# Patient Record
Sex: MALE | Race: WHITE | Employment: OTHER | URBAN - NONMETROPOLITAN AREA
[De-identification: names, ages, dates, MRNs, and addresses within clinical notes are randomized per-mention and may not be internally consistent; named-entity substitution may affect disease eponyms.]

---

## 2017-01-01 ENCOUNTER — APPOINTMENT (OUTPATIENT)
Dept: CT IMAGING | Age: 77
DRG: 871 | End: 2017-01-01
Payer: MEDICARE

## 2017-01-01 ENCOUNTER — APPOINTMENT (OUTPATIENT)
Dept: GENERAL RADIOLOGY | Age: 77
DRG: 871 | End: 2017-01-01
Payer: MEDICARE

## 2017-01-01 ENCOUNTER — TELEPHONE (OUTPATIENT)
Dept: INTERNAL MEDICINE | Age: 77
End: 2017-01-01

## 2017-01-01 ENCOUNTER — HOSPITAL ENCOUNTER (INPATIENT)
Age: 77
LOS: 1 days | Discharge: HOSPICE | DRG: 193 | End: 2017-11-30
Attending: EMERGENCY MEDICINE | Admitting: HOSPITALIST
Payer: MEDICARE

## 2017-01-01 ENCOUNTER — INITIAL CONSULT (OUTPATIENT)
Dept: INTERNAL MEDICINE | Age: 77
End: 2017-01-01
Payer: MEDICARE

## 2017-01-01 ENCOUNTER — OFFICE VISIT (OUTPATIENT)
Dept: INTERNAL MEDICINE | Age: 77
End: 2017-01-01
Payer: MEDICARE

## 2017-01-01 ENCOUNTER — HOSPITAL ENCOUNTER (INPATIENT)
Age: 77
LOS: 8 days | Discharge: NURSING, REHAB OR LONG-TERM CARE | DRG: 871 | End: 2017-08-23
Attending: EMERGENCY MEDICINE | Admitting: INTERNAL MEDICINE
Payer: MEDICARE

## 2017-01-01 ENCOUNTER — HOSPITAL ENCOUNTER (OUTPATIENT)
Age: 77
End: 2017-12-01
Attending: INTERNAL MEDICINE | Admitting: INTERNAL MEDICINE
Payer: COMMERCIAL

## 2017-01-01 ENCOUNTER — APPOINTMENT (OUTPATIENT)
Dept: GENERAL RADIOLOGY | Age: 77
DRG: 193 | End: 2017-01-01
Payer: MEDICARE

## 2017-01-01 VITALS
DIASTOLIC BLOOD PRESSURE: 61 MMHG | TEMPERATURE: 97.9 F | HEART RATE: 105 BPM | BODY MASS INDEX: 20.92 KG/M2 | OXYGEN SATURATION: 92 % | WEIGHT: 138 LBS | RESPIRATION RATE: 20 BRPM | SYSTOLIC BLOOD PRESSURE: 97 MMHG | HEIGHT: 68 IN

## 2017-01-01 VITALS
WEIGHT: 143 LBS | HEIGHT: 68 IN | SYSTOLIC BLOOD PRESSURE: 120 MMHG | DIASTOLIC BLOOD PRESSURE: 60 MMHG | OXYGEN SATURATION: 94 % | HEART RATE: 90 BPM | BODY MASS INDEX: 21.67 KG/M2

## 2017-01-01 VITALS
RESPIRATION RATE: 32 BRPM | DIASTOLIC BLOOD PRESSURE: 80 MMHG | HEART RATE: 108 BPM | SYSTOLIC BLOOD PRESSURE: 132 MMHG | TEMPERATURE: 98 F

## 2017-01-01 VITALS — OXYGEN SATURATION: 90 % | HEART RATE: 106 BPM | DIASTOLIC BLOOD PRESSURE: 70 MMHG | SYSTOLIC BLOOD PRESSURE: 132 MMHG

## 2017-01-01 VITALS
OXYGEN SATURATION: 89 % | WEIGHT: 152 LBS | HEART RATE: 66 BPM | HEIGHT: 68 IN | RESPIRATION RATE: 22 BRPM | SYSTOLIC BLOOD PRESSURE: 144 MMHG | BODY MASS INDEX: 23.04 KG/M2 | DIASTOLIC BLOOD PRESSURE: 88 MMHG | TEMPERATURE: 97.8 F

## 2017-01-01 DIAGNOSIS — Z23 FLU VACCINE NEED: Primary | ICD-10-CM

## 2017-01-01 DIAGNOSIS — J18.9 PNEUMONIA DUE TO ORGANISM: ICD-10-CM

## 2017-01-01 DIAGNOSIS — C34.90 MALIGNANT NEOPLASM OF LUNG, UNSPECIFIED LATERALITY, UNSPECIFIED PART OF LUNG (HCC): ICD-10-CM

## 2017-01-01 DIAGNOSIS — C34.32: ICD-10-CM

## 2017-01-01 DIAGNOSIS — C34.90 MALIGNANT NEOPLASM OF LUNG, UNSPECIFIED LATERALITY, UNSPECIFIED PART OF LUNG (HCC): Primary | ICD-10-CM

## 2017-01-01 DIAGNOSIS — R53.1 WEAKNESS: Primary | ICD-10-CM

## 2017-01-01 DIAGNOSIS — J70.0 RADIATION PNEUMONITIS (HCC): ICD-10-CM

## 2017-01-01 DIAGNOSIS — I10 ESSENTIAL HYPERTENSION: ICD-10-CM

## 2017-01-01 DIAGNOSIS — E78.01 FAMILIAL HYPERCHOLESTEROLEMIA: ICD-10-CM

## 2017-01-01 DIAGNOSIS — J96.21 ACUTE ON CHRONIC RESPIRATORY FAILURE WITH HYPOXIA (HCC): ICD-10-CM

## 2017-01-01 DIAGNOSIS — C34.32 PRIMARY MALIGNANT NEOPLASM OF BRONCHUS OF LEFT LOWER LOBE (HCC): ICD-10-CM

## 2017-01-01 DIAGNOSIS — R06.89 DYSPNEA AND RESPIRATORY ABNORMALITIES: ICD-10-CM

## 2017-01-01 DIAGNOSIS — J18.9 ACUTE PNEUMONITIS: Primary | ICD-10-CM

## 2017-01-01 DIAGNOSIS — R06.00 DYSPNEA AND RESPIRATORY ABNORMALITIES: ICD-10-CM

## 2017-01-01 DIAGNOSIS — Z92.3 HISTORY OF THERAPEUTIC RADIATION: ICD-10-CM

## 2017-01-01 DIAGNOSIS — Z00.00 ROUTINE GENERAL MEDICAL EXAMINATION AT A HEALTH CARE FACILITY: Primary | ICD-10-CM

## 2017-01-01 LAB
ALBUMIN SERPL-MCNC: 3 G/DL (ref 3.5–5.2)
ALBUMIN SERPL-MCNC: 3.2 G/DL (ref 3.5–5.2)
ALBUMIN SERPL-MCNC: 3.2 G/DL (ref 3.5–5.2)
ALP BLD-CCNC: 50 U/L (ref 40–130)
ALP BLD-CCNC: 70 U/L (ref 40–130)
ALP BLD-CCNC: 86 U/L (ref 40–130)
ALT SERPL-CCNC: 26 U/L (ref 5–41)
ALT SERPL-CCNC: 32 U/L (ref 5–41)
ALT SERPL-CCNC: 90 U/L (ref 5–41)
ANION GAP SERPL CALCULATED.3IONS-SCNC: 13 MMOL/L (ref 7–19)
ANION GAP SERPL CALCULATED.3IONS-SCNC: 14 MMOL/L (ref 7–19)
ANION GAP SERPL CALCULATED.3IONS-SCNC: 14 MMOL/L (ref 7–19)
ANION GAP SERPL CALCULATED.3IONS-SCNC: 15 MMOL/L (ref 7–19)
ANION GAP SERPL CALCULATED.3IONS-SCNC: 15 MMOL/L (ref 7–19)
ANION GAP SERPL CALCULATED.3IONS-SCNC: 16 MMOL/L (ref 7–19)
ANION GAP SERPL CALCULATED.3IONS-SCNC: 18 MMOL/L (ref 7–19)
AST SERPL-CCNC: 22 U/L (ref 5–40)
AST SERPL-CCNC: 22 U/L (ref 5–40)
AST SERPL-CCNC: 39 U/L (ref 5–40)
BASE EXCESS ARTERIAL: 2.4 MMOL/L (ref -2–2)
BASE EXCESS ARTERIAL: 7.2 MMOL/L (ref -2–2)
BASOPHILS ABSOLUTE: 0 K/UL (ref 0–0.2)
BASOPHILS ABSOLUTE: 0.1 K/UL (ref 0–0.2)
BASOPHILS RELATIVE PERCENT: 0.1 % (ref 0–1)
BASOPHILS RELATIVE PERCENT: 0.2 % (ref 0–1)
BASOPHILS RELATIVE PERCENT: 0.7 % (ref 0–1)
BASOPHILS RELATIVE PERCENT: 0.8 % (ref 0–1)
BASOPHILS RELATIVE PERCENT: 0.8 % (ref 0–1)
BASOPHILS RELATIVE PERCENT: 0.9 % (ref 0–1)
BILIRUB SERPL-MCNC: 0.3 MG/DL (ref 0.2–1.2)
BLOOD CULTURE, ROUTINE: NORMAL
BUN BLDV-MCNC: 11 MG/DL (ref 8–23)
BUN BLDV-MCNC: 11 MG/DL (ref 8–23)
BUN BLDV-MCNC: 13 MG/DL (ref 8–23)
BUN BLDV-MCNC: 16 MG/DL (ref 8–23)
BUN BLDV-MCNC: 18 MG/DL (ref 8–23)
BUN BLDV-MCNC: 24 MG/DL (ref 8–23)
BUN BLDV-MCNC: 29 MG/DL (ref 8–23)
BUN BLDV-MCNC: 39 MG/DL (ref 8–23)
BUN BLDV-MCNC: 53 MG/DL (ref 8–23)
CALCIUM SERPL-MCNC: 8.4 MG/DL (ref 8.8–10.2)
CALCIUM SERPL-MCNC: 8.6 MG/DL (ref 8.8–10.2)
CALCIUM SERPL-MCNC: 8.7 MG/DL (ref 8.8–10.2)
CALCIUM SERPL-MCNC: 8.7 MG/DL (ref 8.8–10.2)
CALCIUM SERPL-MCNC: 9 MG/DL (ref 8.8–10.2)
CALCIUM SERPL-MCNC: 9.1 MG/DL (ref 8.8–10.2)
CALCIUM SERPL-MCNC: 9.2 MG/DL (ref 8.8–10.2)
CALCIUM SERPL-MCNC: 9.2 MG/DL (ref 8.8–10.2)
CALCIUM SERPL-MCNC: 9.4 MG/DL (ref 8.8–10.2)
CARBOXYHEMOGLOBIN ARTERIAL: 1.9 % (ref 0–5)
CARBOXYHEMOGLOBIN ARTERIAL: 2.6 % (ref 0–5)
CHLORIDE BLD-SCNC: 101 MMOL/L (ref 98–111)
CHLORIDE BLD-SCNC: 101 MMOL/L (ref 98–111)
CHLORIDE BLD-SCNC: 102 MMOL/L (ref 98–111)
CHLORIDE BLD-SCNC: 103 MMOL/L (ref 98–111)
CHLORIDE BLD-SCNC: 89 MMOL/L (ref 98–111)
CHLORIDE BLD-SCNC: 91 MMOL/L (ref 98–111)
CHLORIDE BLD-SCNC: 96 MMOL/L (ref 98–111)
CHOLESTEROL, TOTAL: 120 MG/DL (ref 160–199)
CO2: 18 MMOL/L (ref 22–29)
CO2: 20 MMOL/L (ref 22–29)
CO2: 21 MMOL/L (ref 22–29)
CO2: 24 MMOL/L (ref 22–29)
CO2: 27 MMOL/L (ref 22–29)
CO2: 27 MMOL/L (ref 22–29)
CO2: 28 MMOL/L (ref 22–29)
CO2: 30 MMOL/L (ref 22–29)
CO2: 31 MMOL/L (ref 22–29)
CREAT SERPL-MCNC: 0.5 MG/DL (ref 0.5–1.2)
CREAT SERPL-MCNC: 0.5 MG/DL (ref 0.5–1.2)
CREAT SERPL-MCNC: 0.6 MG/DL (ref 0.5–1.2)
CREAT SERPL-MCNC: 0.7 MG/DL (ref 0.5–1.2)
CREAT SERPL-MCNC: 0.7 MG/DL (ref 0.5–1.2)
CREAT SERPL-MCNC: 0.8 MG/DL (ref 0.5–1.2)
CREAT SERPL-MCNC: 0.8 MG/DL (ref 0.5–1.2)
CULTURE, BLOOD 2: NORMAL
CULTURE, RESPIRATORY: NORMAL
EKG P AXIS: 14 DEGREES
EKG P AXIS: 23 DEGREES
EKG P AXIS: 38 DEGREES
EKG P-R INTERVAL: 139 MS
EKG P-R INTERVAL: 144 MS
EKG P-R INTERVAL: 144 MS
EKG Q-T INTERVAL: 310 MS
EKG Q-T INTERVAL: 316 MS
EKG Q-T INTERVAL: 336 MS
EKG QRS DURATION: 70 MS
EKG QRS DURATION: 78 MS
EKG QRS DURATION: 80 MS
EKG QTC CALCULATION (BAZETT): 420 MS
EKG QTC CALCULATION (BAZETT): 433 MS
EKG QTC CALCULATION (BAZETT): 454 MS
EKG T AXIS: 80 DEGREES
EKG T AXIS: 90 DEGREES
EKG T AXIS: 93 DEGREES
EOSINOPHILS ABSOLUTE: 0 K/UL (ref 0–0.6)
EOSINOPHILS ABSOLUTE: 0 K/UL (ref 0–0.6)
EOSINOPHILS ABSOLUTE: 0.1 K/UL (ref 0–0.6)
EOSINOPHILS ABSOLUTE: 0.4 K/UL (ref 0–0.6)
EOSINOPHILS ABSOLUTE: 0.4 K/UL (ref 0–0.6)
EOSINOPHILS ABSOLUTE: 0.5 K/UL (ref 0–0.6)
EOSINOPHILS RELATIVE PERCENT: 0 % (ref 0–5)
EOSINOPHILS RELATIVE PERCENT: 0 % (ref 0–5)
EOSINOPHILS RELATIVE PERCENT: 1.3 % (ref 0–5)
EOSINOPHILS RELATIVE PERCENT: 2.8 % (ref 0–5)
EOSINOPHILS RELATIVE PERCENT: 3.9 % (ref 0–5)
EOSINOPHILS RELATIVE PERCENT: 4 % (ref 0–5)
GFR NON-AFRICAN AMERICAN: >60
GLUCOSE BLD-MCNC: 101 MG/DL (ref 74–109)
GLUCOSE BLD-MCNC: 116 MG/DL (ref 74–109)
GLUCOSE BLD-MCNC: 123 MG/DL (ref 70–99)
GLUCOSE BLD-MCNC: 138 MG/DL (ref 74–109)
GLUCOSE BLD-MCNC: 189 MG/DL (ref 70–99)
GLUCOSE BLD-MCNC: 189 MG/DL (ref 70–99)
GLUCOSE BLD-MCNC: 196 MG/DL (ref 74–109)
GLUCOSE BLD-MCNC: 247 MG/DL (ref 74–109)
GLUCOSE BLD-MCNC: 255 MG/DL (ref 70–99)
GLUCOSE BLD-MCNC: 271 MG/DL (ref 74–109)
GLUCOSE BLD-MCNC: 295 MG/DL (ref 70–99)
GLUCOSE BLD-MCNC: 295 MG/DL (ref 70–99)
GLUCOSE BLD-MCNC: 303 MG/DL (ref 70–99)
GLUCOSE BLD-MCNC: 304 MG/DL (ref 70–99)
GLUCOSE BLD-MCNC: 307 MG/DL (ref 74–109)
GLUCOSE BLD-MCNC: 324 MG/DL (ref 70–99)
GLUCOSE BLD-MCNC: 350 MG/DL (ref 70–99)
GLUCOSE BLD-MCNC: 356 MG/DL (ref 70–99)
GLUCOSE BLD-MCNC: 367 MG/DL (ref 70–99)
GLUCOSE BLD-MCNC: 424 MG/DL (ref 70–99)
GLUCOSE BLD-MCNC: 529 MG/DL (ref 70–99)
GLUCOSE BLD-MCNC: 560 MG/DL (ref 74–109)
GLUCOSE BLD-MCNC: 94 MG/DL (ref 74–109)
GRAM STAIN RESULT: NORMAL
HCO3 ARTERIAL: 24.5 MMOL/L (ref 22–26)
HCO3 ARTERIAL: 31.2 MMOL/L (ref 22–26)
HCT VFR BLD CALC: 35.8 % (ref 42–52)
HCT VFR BLD CALC: 36 % (ref 42–52)
HCT VFR BLD CALC: 36.3 % (ref 42–52)
HCT VFR BLD CALC: 37.4 % (ref 42–52)
HCT VFR BLD CALC: 37.7 % (ref 42–52)
HCT VFR BLD CALC: 38.9 % (ref 42–52)
HCT VFR BLD CALC: 40.9 % (ref 42–52)
HCT VFR BLD CALC: 41.1 % (ref 42–52)
HCT VFR BLD CALC: 41.2 % (ref 42–52)
HDLC SERPL-MCNC: 43 MG/DL (ref 55–121)
HEMOGLOBIN, ART, EXTENDED: 12.8 G/DL (ref 14–18)
HEMOGLOBIN, ART, EXTENDED: 13.8 G/DL (ref 14–18)
HEMOGLOBIN: 11.4 G/DL (ref 14–18)
HEMOGLOBIN: 11.6 G/DL (ref 14–18)
HEMOGLOBIN: 11.8 G/DL (ref 14–18)
HEMOGLOBIN: 11.8 G/DL (ref 14–18)
HEMOGLOBIN: 11.9 G/DL (ref 14–18)
HEMOGLOBIN: 12.2 G/DL (ref 14–18)
HEMOGLOBIN: 12.6 G/DL (ref 14–18)
HEMOGLOBIN: 12.9 G/DL (ref 14–18)
HEMOGLOBIN: 13.5 G/DL (ref 14–18)
INR BLD: 1.14 (ref 0.88–1.18)
LACTIC ACID: 1.9 MG/DL (ref 0.5–1.9)
LACTIC ACID: 2 MG/DL (ref 0.5–1.9)
LACTIC ACID: 2.1 MG/DL (ref 0.5–1.9)
LACTIC ACID: 2.4 MG/DL (ref 0.5–1.9)
LDL CHOLESTEROL CALCULATED: 58 MG/DL
LV EF: 58 %
LVEF MODALITY: NORMAL
LYMPHOCYTES ABSOLUTE: 0.4 K/UL (ref 1.1–4.5)
LYMPHOCYTES ABSOLUTE: 0.5 K/UL (ref 1.1–4.5)
LYMPHOCYTES ABSOLUTE: 0.7 K/UL (ref 1.1–4.5)
LYMPHOCYTES ABSOLUTE: 0.7 K/UL (ref 1.1–4.5)
LYMPHOCYTES ABSOLUTE: 0.8 K/UL (ref 1.1–4.5)
LYMPHOCYTES ABSOLUTE: 1.3 K/UL (ref 1.1–4.5)
LYMPHOCYTES RELATIVE PERCENT: 12.4 % (ref 20–40)
LYMPHOCYTES RELATIVE PERCENT: 2.2 % (ref 20–40)
LYMPHOCYTES RELATIVE PERCENT: 3.2 % (ref 20–40)
LYMPHOCYTES RELATIVE PERCENT: 3.2 % (ref 20–40)
LYMPHOCYTES RELATIVE PERCENT: 5.7 % (ref 20–40)
LYMPHOCYTES RELATIVE PERCENT: 7.6 % (ref 20–40)
MAGNESIUM: 2.2 MG/DL (ref 1.6–2.4)
MCH RBC QN AUTO: 27.8 PG (ref 27–31)
MCH RBC QN AUTO: 27.9 PG (ref 27–31)
MCH RBC QN AUTO: 28.5 PG (ref 27–31)
MCH RBC QN AUTO: 29.1 PG (ref 27–31)
MCH RBC QN AUTO: 29.3 PG (ref 27–31)
MCH RBC QN AUTO: 29.7 PG (ref 27–31)
MCH RBC QN AUTO: 29.9 PG (ref 27–31)
MCH RBC QN AUTO: 30 PG (ref 27–31)
MCH RBC QN AUTO: 30.1 PG (ref 27–31)
MCHC RBC AUTO-ENTMCNC: 30.3 G/DL (ref 33–37)
MCHC RBC AUTO-ENTMCNC: 30.7 G/DL (ref 33–37)
MCHC RBC AUTO-ENTMCNC: 31.3 G/DL (ref 33–37)
MCHC RBC AUTO-ENTMCNC: 31.5 G/DL (ref 33–37)
MCHC RBC AUTO-ENTMCNC: 31.8 G/DL (ref 33–37)
MCHC RBC AUTO-ENTMCNC: 32 G/DL (ref 33–37)
MCHC RBC AUTO-ENTMCNC: 32.6 G/DL (ref 33–37)
MCHC RBC AUTO-ENTMCNC: 32.8 G/DL (ref 33–37)
MCHC RBC AUTO-ENTMCNC: 33.1 G/DL (ref 33–37)
MCV RBC AUTO: 90.3 FL (ref 80–94)
MCV RBC AUTO: 90.5 FL (ref 80–94)
MCV RBC AUTO: 91.1 FL (ref 80–94)
MCV RBC AUTO: 91.3 FL (ref 80–94)
MCV RBC AUTO: 91.6 FL (ref 80–94)
MCV RBC AUTO: 91.7 FL (ref 80–94)
MCV RBC AUTO: 92.3 FL (ref 80–94)
MCV RBC AUTO: 92.8 FL (ref 80–94)
MCV RBC AUTO: 93.5 FL (ref 80–94)
METHEMOGLOBIN ARTERIAL: 1.1 %
METHEMOGLOBIN ARTERIAL: 1.1 %
MONOCYTES ABSOLUTE: 0.9 K/UL (ref 0–0.9)
MONOCYTES ABSOLUTE: 1.2 K/UL (ref 0–0.9)
MONOCYTES ABSOLUTE: 1.3 K/UL (ref 0–0.9)
MONOCYTES ABSOLUTE: 1.3 K/UL (ref 0–0.9)
MONOCYTES ABSOLUTE: 1.4 K/UL (ref 0–0.9)
MONOCYTES ABSOLUTE: 1.4 K/UL (ref 0–0.9)
MONOCYTES RELATIVE PERCENT: 10.3 % (ref 0–10)
MONOCYTES RELATIVE PERCENT: 11.5 % (ref 0–10)
MONOCYTES RELATIVE PERCENT: 14 % (ref 0–10)
MONOCYTES RELATIVE PERCENT: 6 % (ref 0–10)
MONOCYTES RELATIVE PERCENT: 6.6 % (ref 0–10)
MONOCYTES RELATIVE PERCENT: 6.7 % (ref 0–10)
NEUTROPHILS ABSOLUTE: 11.1 K/UL (ref 1.5–7.5)
NEUTROPHILS ABSOLUTE: 17.8 K/UL (ref 1.5–7.5)
NEUTROPHILS ABSOLUTE: 19.9 K/UL (ref 1.5–7.5)
NEUTROPHILS ABSOLUTE: 6.8 K/UL (ref 1.5–7.5)
NEUTROPHILS ABSOLUTE: 7.8 K/UL (ref 1.5–7.5)
NEUTROPHILS ABSOLUTE: 9.8 K/UL (ref 1.5–7.5)
NEUTROPHILS RELATIVE PERCENT: 67.3 % (ref 50–65)
NEUTROPHILS RELATIVE PERCENT: 75.1 % (ref 50–65)
NEUTROPHILS RELATIVE PERCENT: 75.4 % (ref 50–65)
NEUTROPHILS RELATIVE PERCENT: 83.3 % (ref 50–65)
NEUTROPHILS RELATIVE PERCENT: 84.2 % (ref 50–65)
NEUTROPHILS RELATIVE PERCENT: 89.5 % (ref 50–65)
O2 CONTENT ARTERIAL: 15.8 ML/DL
O2 CONTENT ARTERIAL: 17.6 ML/DL
O2 SAT, ARTERIAL: 88 %
O2 SAT, ARTERIAL: 91 %
O2 THERAPY: ABNORMAL
O2 THERAPY: ABNORMAL
PCO2 ARTERIAL: 30 MMHG (ref 35–45)
PCO2 ARTERIAL: 41 MMHG (ref 35–45)
PDW BLD-RTO: 15 % (ref 11.5–14.5)
PDW BLD-RTO: 15.2 % (ref 11.5–14.5)
PDW BLD-RTO: 15.3 % (ref 11.5–14.5)
PDW BLD-RTO: 15.3 % (ref 11.5–14.5)
PDW BLD-RTO: 15.4 % (ref 11.5–14.5)
PDW BLD-RTO: 15.9 % (ref 11.5–14.5)
PDW BLD-RTO: 15.9 % (ref 11.5–14.5)
PDW BLD-RTO: 16.1 % (ref 11.5–14.5)
PDW BLD-RTO: 16.4 % (ref 11.5–14.5)
PERFORMED ON: ABNORMAL
PH ARTERIAL: 7.49 (ref 7.35–7.45)
PH ARTERIAL: 7.52 (ref 7.35–7.45)
PLATELET # BLD: 277 K/UL (ref 130–400)
PLATELET # BLD: 283 K/UL (ref 130–400)
PLATELET # BLD: 335 K/UL (ref 130–400)
PLATELET # BLD: 343 K/UL (ref 130–400)
PLATELET # BLD: 349 K/UL (ref 130–400)
PLATELET # BLD: 382 K/UL (ref 130–400)
PLATELET # BLD: 384 K/UL (ref 130–400)
PLATELET # BLD: 417 K/UL (ref 130–400)
PLATELET # BLD: 421 K/UL (ref 130–400)
PMV BLD AUTO: 8.3 FL (ref 9.4–12.4)
PMV BLD AUTO: 8.7 FL (ref 9.4–12.4)
PMV BLD AUTO: 8.9 FL (ref 9.4–12.4)
PMV BLD AUTO: 8.9 FL (ref 9.4–12.4)
PMV BLD AUTO: 9 FL (ref 9.4–12.4)
PMV BLD AUTO: 9.1 FL (ref 9.4–12.4)
PMV BLD AUTO: 9.3 FL (ref 9.4–12.4)
PMV BLD AUTO: 9.5 FL (ref 9.4–12.4)
PMV BLD AUTO: 9.7 FL (ref 9.4–12.4)
PO2 ARTERIAL: 50 MMHG (ref 80–100)
PO2 ARTERIAL: 52 MMHG (ref 80–100)
POTASSIUM SERPL-SCNC: 3.4 MMOL/L (ref 3.5–5)
POTASSIUM SERPL-SCNC: 3.5 MMOL/L (ref 3.5–5)
POTASSIUM SERPL-SCNC: 3.6 MMOL/L (ref 3.5–5)
POTASSIUM SERPL-SCNC: 3.7 MMOL/L (ref 3.5–5)
POTASSIUM SERPL-SCNC: 4 MMOL/L (ref 3.5–5)
POTASSIUM SERPL-SCNC: 4.1 MMOL/L (ref 3.5–5)
POTASSIUM SERPL-SCNC: 4.1 MMOL/L (ref 3.5–5)
POTASSIUM SERPL-SCNC: 4.5 MMOL/L (ref 3.5–5)
POTASSIUM SERPL-SCNC: 4.7 MMOL/L (ref 3.5–5)
POTASSIUM, WHOLE BLOOD: 3.9
POTASSIUM, WHOLE BLOOD: 3.9
PRO-BNP: 1023 PG/ML (ref 0–1800)
PRO-BNP: 1438 PG/ML (ref 0–1800)
PRO-BNP: 3365 PG/ML (ref 0–1800)
PRO-BNP: 553 PG/ML (ref 0–1800)
PROSTATE SPECIFIC ANTIGEN: 0.02 NG/ML (ref 0–4)
PROTHROMBIN TIME: 14.5 SEC (ref 12–14.6)
RAPID INFLUENZA  B AGN: NEGATIVE
RAPID INFLUENZA A AGN: NEGATIVE
RBC # BLD: 3.91 M/UL (ref 4.7–6.1)
RBC # BLD: 3.92 M/UL (ref 4.7–6.1)
RBC # BLD: 3.98 M/UL (ref 4.7–6.1)
RBC # BLD: 4.03 M/UL (ref 4.7–6.1)
RBC # BLD: 4.05 M/UL (ref 4.7–6.1)
RBC # BLD: 4.24 M/UL (ref 4.7–6.1)
RBC # BLD: 4.5 M/UL (ref 4.7–6.1)
RBC # BLD: 4.51 M/UL (ref 4.7–6.1)
RBC # BLD: 4.53 M/UL (ref 4.7–6.1)
SODIUM BLD-SCNC: 131 MMOL/L (ref 136–145)
SODIUM BLD-SCNC: 133 MMOL/L (ref 136–145)
SODIUM BLD-SCNC: 136 MMOL/L (ref 136–145)
SODIUM BLD-SCNC: 137 MMOL/L (ref 136–145)
SODIUM BLD-SCNC: 139 MMOL/L (ref 136–145)
SODIUM BLD-SCNC: 140 MMOL/L (ref 136–145)
SODIUM BLD-SCNC: 142 MMOL/L (ref 136–145)
TOTAL PROTEIN: 6.9 G/DL (ref 6.6–8.7)
TOTAL PROTEIN: 7.1 G/DL (ref 6.6–8.7)
TOTAL PROTEIN: 7.4 G/DL (ref 6.6–8.7)
TRIGL SERPL-MCNC: 96 MG/DL (ref 150–199)
TSH SERPL DL<=0.05 MIU/L-ACNC: 0.44 UIU/ML (ref 0.27–4.2)
WBC # BLD: 10.2 K/UL (ref 4.8–10.8)
WBC # BLD: 10.4 K/UL (ref 4.8–10.8)
WBC # BLD: 10.7 K/UL (ref 4.8–10.8)
WBC # BLD: 13 K/UL (ref 4.8–10.8)
WBC # BLD: 13.3 K/UL (ref 4.8–10.8)
WBC # BLD: 21.2 K/UL (ref 4.8–10.8)
WBC # BLD: 21.2 K/UL (ref 4.8–10.8)
WBC # BLD: 22.2 K/UL (ref 4.8–10.8)
WBC # BLD: 24.1 K/UL (ref 4.8–10.8)

## 2017-01-01 PROCEDURE — 2700000000 HC OXYGEN THERAPY PER DAY

## 2017-01-01 PROCEDURE — 6370000000 HC RX 637 (ALT 250 FOR IP): Performed by: INTERNAL MEDICINE

## 2017-01-01 PROCEDURE — 36415 COLL VENOUS BLD VENIPUNCTURE: CPT

## 2017-01-01 PROCEDURE — 6370000000 HC RX 637 (ALT 250 FOR IP): Performed by: PHYSICIAN ASSISTANT

## 2017-01-01 PROCEDURE — 94640 AIRWAY INHALATION TREATMENT: CPT

## 2017-01-01 PROCEDURE — 2580000003 HC RX 258: Performed by: PHYSICIAN ASSISTANT

## 2017-01-01 PROCEDURE — 85027 COMPLETE CBC AUTOMATED: CPT

## 2017-01-01 PROCEDURE — 71010 XR CHEST PORTABLE: CPT

## 2017-01-01 PROCEDURE — 87040 BLOOD CULTURE FOR BACTERIA: CPT

## 2017-01-01 PROCEDURE — 1036F TOBACCO NON-USER: CPT | Performed by: INTERNAL MEDICINE

## 2017-01-01 PROCEDURE — 99284 EMERGENCY DEPT VISIT MOD MDM: CPT | Performed by: EMERGENCY MEDICINE

## 2017-01-01 PROCEDURE — 6360000002 HC RX W HCPCS: Performed by: PHYSICIAN ASSISTANT

## 2017-01-01 PROCEDURE — 82948 REAGENT STRIP/BLOOD GLUCOSE: CPT

## 2017-01-01 PROCEDURE — 84443 ASSAY THYROID STIM HORMONE: CPT

## 2017-01-01 PROCEDURE — 83605 ASSAY OF LACTIC ACID: CPT

## 2017-01-01 PROCEDURE — G8979 MOBILITY GOAL STATUS: HCPCS

## 2017-01-01 PROCEDURE — 2580000003 HC RX 258: Performed by: EMERGENCY MEDICINE

## 2017-01-01 PROCEDURE — 80048 BASIC METABOLIC PNL TOTAL CA: CPT

## 2017-01-01 PROCEDURE — 99232 SBSQ HOSP IP/OBS MODERATE 35: CPT | Performed by: INTERNAL MEDICINE

## 2017-01-01 PROCEDURE — 99285 EMERGENCY DEPT VISIT HI MDM: CPT

## 2017-01-01 PROCEDURE — 80053 COMPREHEN METABOLIC PANEL: CPT

## 2017-01-01 PROCEDURE — 36600 WITHDRAWAL OF ARTERIAL BLOOD: CPT

## 2017-01-01 PROCEDURE — 6370000000 HC RX 637 (ALT 250 FOR IP): Performed by: NURSE PRACTITIONER

## 2017-01-01 PROCEDURE — 6360000002 HC RX W HCPCS: Performed by: EMERGENCY MEDICINE

## 2017-01-01 PROCEDURE — G8978 MOBILITY CURRENT STATUS: HCPCS

## 2017-01-01 PROCEDURE — 6360000002 HC RX W HCPCS: Performed by: INTERNAL MEDICINE

## 2017-01-01 PROCEDURE — 83735 ASSAY OF MAGNESIUM: CPT

## 2017-01-01 PROCEDURE — 99222 1ST HOSP IP/OBS MODERATE 55: CPT | Performed by: INTERNAL MEDICINE

## 2017-01-01 PROCEDURE — 99233 SBSQ HOSP IP/OBS HIGH 50: CPT | Performed by: INTERNAL MEDICINE

## 2017-01-01 PROCEDURE — 1210000000 HC MED SURG R&B

## 2017-01-01 PROCEDURE — 71020 XR CHEST STANDARD TWO VW: CPT

## 2017-01-01 PROCEDURE — 6370000000 HC RX 637 (ALT 250 FOR IP): Performed by: HOSPITALIST

## 2017-01-01 PROCEDURE — 1123F ACP DISCUSS/DSCN MKR DOCD: CPT | Performed by: INTERNAL MEDICINE

## 2017-01-01 PROCEDURE — 99239 HOSP IP/OBS DSCHRG MGMT >30: CPT | Performed by: INTERNAL MEDICINE

## 2017-01-01 PROCEDURE — 85025 COMPLETE CBC W/AUTO DIFF WBC: CPT

## 2017-01-01 PROCEDURE — 83880 ASSAY OF NATRIURETIC PEPTIDE: CPT

## 2017-01-01 PROCEDURE — 93971 EXTREMITY STUDY: CPT

## 2017-01-01 PROCEDURE — 93005 ELECTROCARDIOGRAM TRACING: CPT

## 2017-01-01 PROCEDURE — 99222 1ST HOSP IP/OBS MODERATE 55: CPT | Performed by: PHYSICIAN ASSISTANT

## 2017-01-01 PROCEDURE — 99223 1ST HOSP IP/OBS HIGH 75: CPT | Performed by: INTERNAL MEDICINE

## 2017-01-01 PROCEDURE — 6360000002 HC RX W HCPCS

## 2017-01-01 PROCEDURE — G8420 CALC BMI NORM PARAMETERS: HCPCS | Performed by: INTERNAL MEDICINE

## 2017-01-01 PROCEDURE — 84132 ASSAY OF SERUM POTASSIUM: CPT

## 2017-01-01 PROCEDURE — 94664 DEMO&/EVAL PT USE INHALER: CPT

## 2017-01-01 PROCEDURE — 6370000000 HC RX 637 (ALT 250 FOR IP): Performed by: EMERGENCY MEDICINE

## 2017-01-01 PROCEDURE — 6360000002 HC RX W HCPCS: Performed by: NURSE PRACTITIONER

## 2017-01-01 PROCEDURE — G8427 DOCREV CUR MEDS BY ELIG CLIN: HCPCS | Performed by: INTERNAL MEDICINE

## 2017-01-01 PROCEDURE — 2580000003 HC RX 258: Performed by: INTERNAL MEDICINE

## 2017-01-01 PROCEDURE — 87804 INFLUENZA ASSAY W/OPTIC: CPT

## 2017-01-01 PROCEDURE — G8484 FLU IMMUNIZE NO ADMIN: HCPCS | Performed by: INTERNAL MEDICINE

## 2017-01-01 PROCEDURE — 6360000002 HC RX W HCPCS: Performed by: HOSPITALIST

## 2017-01-01 PROCEDURE — 82803 BLOOD GASES ANY COMBINATION: CPT

## 2017-01-01 PROCEDURE — 1250000000 HC SEMI PRIVATE HOSPICE R&B

## 2017-01-01 PROCEDURE — 87070 CULTURE OTHR SPECIMN AEROBIC: CPT

## 2017-01-01 PROCEDURE — G0008 ADMIN INFLUENZA VIRUS VAC: HCPCS | Performed by: INTERNAL MEDICINE

## 2017-01-01 PROCEDURE — 93306 TTE W/DOPPLER COMPLETE: CPT

## 2017-01-01 PROCEDURE — 99232 SBSQ HOSP IP/OBS MODERATE 35: CPT | Performed by: HOSPITALIST

## 2017-01-01 PROCEDURE — G0438 PPPS, INITIAL VISIT: HCPCS | Performed by: INTERNAL MEDICINE

## 2017-01-01 PROCEDURE — 90662 IIV NO PRSV INCREASED AG IM: CPT | Performed by: INTERNAL MEDICINE

## 2017-01-01 PROCEDURE — 99214 OFFICE O/P EST MOD 30 MIN: CPT | Performed by: INTERNAL MEDICINE

## 2017-01-01 PROCEDURE — 99232 SBSQ HOSP IP/OBS MODERATE 35: CPT | Performed by: PHYSICIAN ASSISTANT

## 2017-01-01 PROCEDURE — 94762 N-INVAS EAR/PLS OXIMTRY CONT: CPT

## 2017-01-01 PROCEDURE — 71260 CT THORAX DX C+: CPT

## 2017-01-01 PROCEDURE — 87205 SMEAR GRAM STAIN: CPT

## 2017-01-01 PROCEDURE — 99239 HOSP IP/OBS DSCHRG MGMT >30: CPT | Performed by: HOSPITALIST

## 2017-01-01 PROCEDURE — 97162 PT EVAL MOD COMPLEX 30 MIN: CPT

## 2017-01-01 PROCEDURE — 96365 THER/PROPH/DIAG IV INF INIT: CPT

## 2017-01-01 PROCEDURE — 85610 PROTHROMBIN TIME: CPT

## 2017-01-01 PROCEDURE — 4040F PNEUMOC VAC/ADMIN/RCVD: CPT | Performed by: INTERNAL MEDICINE

## 2017-01-01 PROCEDURE — 6360000004 HC RX CONTRAST MEDICATION: Performed by: INTERNAL MEDICINE

## 2017-01-01 RX ORDER — ALBUTEROL SULFATE 2.5 MG/3ML
2.5 SOLUTION RESPIRATORY (INHALATION) EVERY 4 HOURS PRN
Status: DISCONTINUED | OUTPATIENT
Start: 2017-01-01 | End: 2017-01-01

## 2017-01-01 RX ORDER — INSULIN GLARGINE 100 [IU]/ML
10 INJECTION, SOLUTION SUBCUTANEOUS 2 TIMES DAILY
Status: DISCONTINUED | OUTPATIENT
Start: 2017-01-01 | End: 2017-01-01

## 2017-01-01 RX ORDER — ALBUTEROL SULFATE 2.5 MG/3ML
2.5 SOLUTION RESPIRATORY (INHALATION) EVERY 4 HOURS PRN
Status: CANCELLED | OUTPATIENT
Start: 2017-01-01

## 2017-01-01 RX ORDER — GUAIFENESIN 600 MG/1
1200 TABLET, EXTENDED RELEASE ORAL 2 TIMES DAILY
Qty: 20 TABLET | Refills: 0 | Status: SHIPPED | OUTPATIENT
Start: 2017-01-01 | End: 2017-01-01

## 2017-01-01 RX ORDER — FOLIC ACID 1 MG/1
TABLET ORAL
COMMUNITY
Start: 2017-01-01

## 2017-01-01 RX ORDER — SIMVASTATIN 20 MG
40 TABLET ORAL NIGHTLY
Status: DISCONTINUED | OUTPATIENT
Start: 2017-01-01 | End: 2017-01-01 | Stop reason: HOSPADM

## 2017-01-01 RX ORDER — METHYLPREDNISOLONE SODIUM SUCCINATE 40 MG/ML
60 INJECTION, POWDER, LYOPHILIZED, FOR SOLUTION INTRAMUSCULAR; INTRAVENOUS ONCE
Status: COMPLETED | OUTPATIENT
Start: 2017-01-01 | End: 2017-01-01

## 2017-01-01 RX ORDER — FUROSEMIDE 40 MG/1
40 TABLET ORAL DAILY
Qty: 3 TABLET | Refills: 0
Start: 2017-01-01 | End: 2017-01-01 | Stop reason: ALTCHOICE

## 2017-01-01 RX ORDER — PREDNISONE 10 MG/1
10 TABLET ORAL DAILY
COMMUNITY
End: 2017-01-01 | Stop reason: ALTCHOICE

## 2017-01-01 RX ORDER — DEXTROSE MONOHYDRATE 50 MG/ML
100 INJECTION, SOLUTION INTRAVENOUS PRN
Status: DISCONTINUED | OUTPATIENT
Start: 2017-01-01 | End: 2017-01-01 | Stop reason: HOSPADM

## 2017-01-01 RX ORDER — INSULIN GLARGINE 100 [IU]/ML
15 INJECTION, SOLUTION SUBCUTANEOUS 2 TIMES DAILY
Qty: 1 VIAL | Refills: 3
Start: 2017-01-01 | End: 2017-01-01 | Stop reason: ALTCHOICE

## 2017-01-01 RX ORDER — LORAZEPAM 2 MG/ML
1 INJECTION INTRAMUSCULAR
Status: DISCONTINUED | OUTPATIENT
Start: 2017-01-01 | End: 2017-01-01 | Stop reason: HOSPADM

## 2017-01-01 RX ORDER — PREDNISONE 10 MG/1
10 TABLET ORAL
COMMUNITY
Start: 2017-01-01 | End: 2017-01-01 | Stop reason: SDUPTHER

## 2017-01-01 RX ORDER — SODIUM CHLORIDE 9 MG/ML
INJECTION, SOLUTION INTRAVENOUS CONTINUOUS
Status: DISCONTINUED | OUTPATIENT
Start: 2017-01-01 | End: 2017-01-01

## 2017-01-01 RX ORDER — METHYLPREDNISOLONE SODIUM SUCCINATE 40 MG/ML
40 INJECTION, POWDER, LYOPHILIZED, FOR SOLUTION INTRAMUSCULAR; INTRAVENOUS EVERY 6 HOURS
Status: DISCONTINUED | OUTPATIENT
Start: 2017-01-01 | End: 2017-01-01

## 2017-01-01 RX ORDER — LANOLIN ALCOHOL/MO/W.PET/CERES
6 CREAM (GRAM) TOPICAL NIGHTLY PRN
Status: DISCONTINUED | OUTPATIENT
Start: 2017-01-01 | End: 2017-01-01

## 2017-01-01 RX ORDER — FOLIC ACID 1 MG/1
1 TABLET ORAL DAILY
Status: CANCELLED | OUTPATIENT
Start: 2017-01-01

## 2017-01-01 RX ORDER — METHYLPREDNISOLONE SODIUM SUCCINATE 40 MG/ML
40 INJECTION, POWDER, LYOPHILIZED, FOR SOLUTION INTRAMUSCULAR; INTRAVENOUS EVERY 8 HOURS
Qty: 360 MG | Refills: 0
Start: 2017-01-01 | End: 2017-01-01

## 2017-01-01 RX ORDER — DOCUSATE SODIUM 100 MG/1
100 CAPSULE, LIQUID FILLED ORAL DAILY
Status: DISCONTINUED | OUTPATIENT
Start: 2017-01-01 | End: 2017-01-01

## 2017-01-01 RX ORDER — ATROPINE SULFATE 10 MG/ML
2 SOLUTION/ DROPS OPHTHALMIC EVERY 4 HOURS PRN
Status: DISCONTINUED | OUTPATIENT
Start: 2017-01-01 | End: 2017-01-01 | Stop reason: HOSPADM

## 2017-01-01 RX ORDER — BISACODYL 10 MG
10 SUPPOSITORY, RECTAL RECTAL DAILY PRN
Status: DISCONTINUED | OUTPATIENT
Start: 2017-01-01 | End: 2017-01-01 | Stop reason: HOSPADM

## 2017-01-01 RX ORDER — LANOLIN ALCOHOL/MO/W.PET/CERES
6 CREAM (GRAM) TOPICAL NIGHTLY PRN
Status: CANCELLED | OUTPATIENT
Start: 2017-01-01

## 2017-01-01 RX ORDER — FOLIC ACID 1 MG/1
1 TABLET ORAL DAILY
Status: DISCONTINUED | OUTPATIENT
Start: 2017-01-01 | End: 2017-01-01

## 2017-01-01 RX ORDER — MORPHINE SULFATE 4 MG/ML
1 INJECTION, SOLUTION INTRAMUSCULAR; INTRAVENOUS
Status: DISCONTINUED | OUTPATIENT
Start: 2017-01-01 | End: 2017-01-01

## 2017-01-01 RX ORDER — INSULIN GLARGINE 100 [IU]/ML
20 INJECTION, SOLUTION SUBCUTANEOUS ONCE
Status: DISCONTINUED | OUTPATIENT
Start: 2017-01-01 | End: 2017-01-01

## 2017-01-01 RX ORDER — POTASSIUM CHLORIDE 20 MEQ/1
20 TABLET, EXTENDED RELEASE ORAL ONCE
Status: COMPLETED | OUTPATIENT
Start: 2017-01-01 | End: 2017-01-01

## 2017-01-01 RX ORDER — DOCUSATE SODIUM 100 MG/1
100 CAPSULE, LIQUID FILLED ORAL 2 TIMES DAILY
Status: DISCONTINUED | OUTPATIENT
Start: 2017-01-01 | End: 2017-01-01 | Stop reason: HOSPADM

## 2017-01-01 RX ORDER — ONDANSETRON 2 MG/ML
4 INJECTION INTRAMUSCULAR; INTRAVENOUS EVERY 6 HOURS PRN
Status: DISCONTINUED | OUTPATIENT
Start: 2017-01-01 | End: 2017-01-01

## 2017-01-01 RX ORDER — MORPHINE SULFATE 4 MG/ML
1 INJECTION, SOLUTION INTRAMUSCULAR; INTRAVENOUS
Status: CANCELLED | OUTPATIENT
Start: 2017-01-01

## 2017-01-01 RX ORDER — GUAIFENESIN 600 MG/1
600 TABLET, EXTENDED RELEASE ORAL 2 TIMES DAILY
Status: DISCONTINUED | OUTPATIENT
Start: 2017-01-01 | End: 2017-01-01

## 2017-01-01 RX ORDER — FOLIC ACID 1 MG/1
1 TABLET ORAL DAILY
Status: DISCONTINUED | OUTPATIENT
Start: 2017-01-01 | End: 2017-01-01 | Stop reason: HOSPADM

## 2017-01-01 RX ORDER — LEVOFLOXACIN 5 MG/ML
750 INJECTION, SOLUTION INTRAVENOUS ONCE
Status: COMPLETED | OUTPATIENT
Start: 2017-01-01 | End: 2017-01-01

## 2017-01-01 RX ORDER — METHYLPREDNISOLONE SODIUM SUCCINATE 125 MG/2ML
60 INJECTION, POWDER, LYOPHILIZED, FOR SOLUTION INTRAMUSCULAR; INTRAVENOUS EVERY 8 HOURS
Status: DISCONTINUED | OUTPATIENT
Start: 2017-01-01 | End: 2017-01-01

## 2017-01-01 RX ORDER — FLUTICASONE FUROATE AND VILANTEROL 100; 25 UG/1; UG/1
POWDER RESPIRATORY (INHALATION) DAILY
COMMUNITY
End: 2017-01-01 | Stop reason: ALTCHOICE

## 2017-01-01 RX ORDER — LEVALBUTEROL INHALATION SOLUTION 1.25 MG/3ML
1.25 SOLUTION RESPIRATORY (INHALATION)
Status: DISCONTINUED | OUTPATIENT
Start: 2017-01-01 | End: 2017-01-01 | Stop reason: HOSPADM

## 2017-01-01 RX ORDER — LEVALBUTEROL INHALATION SOLUTION 1.25 MG/3ML
1.25 SOLUTION RESPIRATORY (INHALATION) EVERY 4 HOURS
Status: DISCONTINUED | OUTPATIENT
Start: 2017-01-01 | End: 2017-01-01 | Stop reason: HOSPADM

## 2017-01-01 RX ORDER — LORAZEPAM 2 MG/ML
1 INJECTION INTRAMUSCULAR EVERY 4 HOURS PRN
Status: DISCONTINUED | OUTPATIENT
Start: 2017-01-01 | End: 2017-01-01

## 2017-01-01 RX ORDER — METHYLPREDNISOLONE SODIUM SUCCINATE 40 MG/ML
40 INJECTION, POWDER, LYOPHILIZED, FOR SOLUTION INTRAMUSCULAR; INTRAVENOUS EVERY 8 HOURS
Status: DISCONTINUED | OUTPATIENT
Start: 2017-01-01 | End: 2017-01-01

## 2017-01-01 RX ORDER — FUROSEMIDE 10 MG/ML
INJECTION INTRAMUSCULAR; INTRAVENOUS
Status: COMPLETED
Start: 2017-01-01 | End: 2017-01-01

## 2017-01-01 RX ORDER — IPRATROPIUM BROMIDE AND ALBUTEROL SULFATE 2.5; .5 MG/3ML; MG/3ML
1 SOLUTION RESPIRATORY (INHALATION) EVERY 4 HOURS PRN
Status: CANCELLED | OUTPATIENT
Start: 2017-01-01

## 2017-01-01 RX ORDER — SCOLOPAMINE TRANSDERMAL SYSTEM 1 MG/1
1 PATCH, EXTENDED RELEASE TRANSDERMAL
Status: DISCONTINUED | OUTPATIENT
Start: 2017-01-01 | End: 2017-01-01 | Stop reason: HOSPADM

## 2017-01-01 RX ORDER — METHYLPREDNISOLONE SODIUM SUCCINATE 40 MG/ML
40 INJECTION, POWDER, LYOPHILIZED, FOR SOLUTION INTRAMUSCULAR; INTRAVENOUS EVERY 8 HOURS
Status: DISCONTINUED | OUTPATIENT
Start: 2017-01-01 | End: 2017-01-01 | Stop reason: HOSPADM

## 2017-01-01 RX ORDER — VANCOMYCIN HYDROCHLORIDE 1 G/200ML
1000 INJECTION, SOLUTION INTRAVENOUS EVERY 12 HOURS
Status: DISCONTINUED | OUTPATIENT
Start: 2017-01-01 | End: 2017-01-01 | Stop reason: HOSPADM

## 2017-01-01 RX ORDER — ACETAMINOPHEN 500 MG
1000 TABLET ORAL EVERY 6 HOURS PRN
COMMUNITY

## 2017-01-01 RX ORDER — LANOLIN ALCOHOL/MO/W.PET/CERES
6 CREAM (GRAM) TOPICAL NIGHTLY PRN
Status: DISCONTINUED | OUTPATIENT
Start: 2017-01-01 | End: 2017-01-01 | Stop reason: HOSPADM

## 2017-01-01 RX ORDER — LEVALBUTEROL INHALATION SOLUTION 1.25 MG/3ML
1.25 SOLUTION RESPIRATORY (INHALATION) EVERY 4 HOURS
Qty: 540 ML | Refills: 0
Start: 2017-01-01 | End: 2017-01-01 | Stop reason: ALTCHOICE

## 2017-01-01 RX ORDER — HALOPERIDOL 5 MG/ML
2 INJECTION INTRAMUSCULAR EVERY 6 HOURS PRN
Status: DISCONTINUED | OUTPATIENT
Start: 2017-01-01 | End: 2017-01-01 | Stop reason: HOSPADM

## 2017-01-01 RX ORDER — FUROSEMIDE 10 MG/ML
40 INJECTION INTRAMUSCULAR; INTRAVENOUS ONCE
Status: COMPLETED | OUTPATIENT
Start: 2017-01-01 | End: 2017-01-01

## 2017-01-01 RX ORDER — INSULIN GLARGINE 100 [IU]/ML
15 INJECTION, SOLUTION SUBCUTANEOUS 2 TIMES DAILY
Status: DISCONTINUED | OUTPATIENT
Start: 2017-01-01 | End: 2017-01-01 | Stop reason: HOSPADM

## 2017-01-01 RX ORDER — GABAPENTIN 300 MG/1
300 CAPSULE ORAL NIGHTLY
COMMUNITY

## 2017-01-01 RX ORDER — GUAIFENESIN 600 MG/1
1200 TABLET, EXTENDED RELEASE ORAL 2 TIMES DAILY
Status: DISCONTINUED | OUTPATIENT
Start: 2017-01-01 | End: 2017-01-01 | Stop reason: HOSPADM

## 2017-01-01 RX ORDER — DEXTROSE MONOHYDRATE 25 G/50ML
12.5 INJECTION, SOLUTION INTRAVENOUS PRN
Status: DISCONTINUED | OUTPATIENT
Start: 2017-01-01 | End: 2017-01-01 | Stop reason: HOSPADM

## 2017-01-01 RX ORDER — MORPHINE SULFATE 4 MG/ML
1 INJECTION, SOLUTION INTRAMUSCULAR; INTRAVENOUS
Status: DISCONTINUED | OUTPATIENT
Start: 2017-01-01 | End: 2017-01-01 | Stop reason: HOSPADM

## 2017-01-01 RX ORDER — BENZONATATE 100 MG/1
200 CAPSULE ORAL 3 TIMES DAILY PRN
COMMUNITY

## 2017-01-01 RX ORDER — FUROSEMIDE 10 MG/ML
40 INJECTION INTRAMUSCULAR; INTRAVENOUS EVERY 12 HOURS
Status: DISCONTINUED | OUTPATIENT
Start: 2017-01-01 | End: 2017-01-01

## 2017-01-01 RX ORDER — INSULIN GLARGINE 100 [IU]/ML
20 INJECTION, SOLUTION SUBCUTANEOUS 2 TIMES DAILY
Status: DISCONTINUED | OUTPATIENT
Start: 2017-01-01 | End: 2017-01-01

## 2017-01-01 RX ORDER — SODIUM CHLORIDE 0.9 % (FLUSH) 0.9 %
10 SYRINGE (ML) INJECTION PRN
Status: DISCONTINUED | OUTPATIENT
Start: 2017-01-01 | End: 2017-01-01 | Stop reason: HOSPADM

## 2017-01-01 RX ORDER — LEVOFLOXACIN 500 MG/1
500 TABLET, FILM COATED ORAL EVERY 24 HOURS
Qty: 5 TABLET | Refills: 0 | Status: SHIPPED | OUTPATIENT
Start: 2017-01-01 | End: 2017-01-01

## 2017-01-01 RX ORDER — POLYETHYLENE GLYCOL 3350 17 G/17G
17 POWDER, FOR SOLUTION ORAL
COMMUNITY
Start: 2017-01-01 | End: 2017-01-01 | Stop reason: ALTCHOICE

## 2017-01-01 RX ORDER — LORAZEPAM 2 MG/ML
1 INJECTION INTRAMUSCULAR
Status: DISCONTINUED | OUTPATIENT
Start: 2017-01-01 | End: 2017-01-01

## 2017-01-01 RX ORDER — IPRATROPIUM BROMIDE AND ALBUTEROL SULFATE 2.5; .5 MG/3ML; MG/3ML
1 SOLUTION RESPIRATORY (INHALATION) ONCE
Status: COMPLETED | OUTPATIENT
Start: 2017-01-01 | End: 2017-01-01

## 2017-01-01 RX ORDER — MORPHINE SULFATE 4 MG/ML
2 INJECTION, SOLUTION INTRAMUSCULAR; INTRAVENOUS
Status: DISCONTINUED | OUTPATIENT
Start: 2017-01-01 | End: 2017-01-01 | Stop reason: HOSPADM

## 2017-01-01 RX ORDER — LISINOPRIL 20 MG/1
20 TABLET ORAL DAILY
Status: DISCONTINUED | OUTPATIENT
Start: 2017-01-01 | End: 2017-01-01

## 2017-01-01 RX ORDER — HYDROXYZINE HYDROCHLORIDE 25 MG/ML
25 INJECTION, SOLUTION INTRAMUSCULAR EVERY 6 HOURS PRN
Status: DISCONTINUED | OUTPATIENT
Start: 2017-01-01 | End: 2017-01-01

## 2017-01-01 RX ORDER — NICOTINE POLACRILEX 4 MG
15 LOZENGE BUCCAL PRN
Status: DISCONTINUED | OUTPATIENT
Start: 2017-01-01 | End: 2017-01-01 | Stop reason: HOSPADM

## 2017-01-01 RX ORDER — DOCUSATE SODIUM 100 MG/1
100 CAPSULE, LIQUID FILLED ORAL DAILY
Status: CANCELLED | OUTPATIENT
Start: 2017-01-01

## 2017-01-01 RX ORDER — LEVOFLOXACIN 5 MG/ML
750 INJECTION, SOLUTION INTRAVENOUS EVERY 24 HOURS
Status: DISCONTINUED | OUTPATIENT
Start: 2017-01-01 | End: 2017-01-01

## 2017-01-01 RX ORDER — ZOLPIDEM TARTRATE 5 MG/1
5 TABLET ORAL NIGHTLY PRN
Status: DISCONTINUED | OUTPATIENT
Start: 2017-01-01 | End: 2017-01-01 | Stop reason: HOSPADM

## 2017-01-01 RX ORDER — FUROSEMIDE 40 MG/1
40 TABLET ORAL DAILY
Status: DISCONTINUED | OUTPATIENT
Start: 2017-01-01 | End: 2017-01-01 | Stop reason: HOSPADM

## 2017-01-01 RX ORDER — IPRATROPIUM BROMIDE AND ALBUTEROL SULFATE 2.5; .5 MG/3ML; MG/3ML
1 SOLUTION RESPIRATORY (INHALATION) EVERY 4 HOURS PRN
Status: DISCONTINUED | OUTPATIENT
Start: 2017-01-01 | End: 2017-01-01 | Stop reason: HOSPADM

## 2017-01-01 RX ORDER — ACETAMINOPHEN 650 MG/1
650 SUPPOSITORY RECTAL EVERY 4 HOURS PRN
Status: DISCONTINUED | OUTPATIENT
Start: 2017-01-01 | End: 2017-01-01 | Stop reason: HOSPADM

## 2017-01-01 RX ORDER — IPRATROPIUM BROMIDE AND ALBUTEROL SULFATE 2.5; .5 MG/3ML; MG/3ML
1 SOLUTION RESPIRATORY (INHALATION) EVERY 4 HOURS PRN
Status: DISCONTINUED | OUTPATIENT
Start: 2017-01-01 | End: 2017-01-01

## 2017-01-01 RX ORDER — POTASSIUM CHLORIDE 20 MEQ/1
20 TABLET, EXTENDED RELEASE ORAL 2 TIMES DAILY
Status: DISCONTINUED | OUTPATIENT
Start: 2017-01-01 | End: 2017-01-01 | Stop reason: HOSPADM

## 2017-01-01 RX ORDER — SODIUM CHLORIDE 0.9 % (FLUSH) 0.9 %
10 SYRINGE (ML) INJECTION EVERY 12 HOURS SCHEDULED
Status: DISCONTINUED | OUTPATIENT
Start: 2017-01-01 | End: 2017-01-01 | Stop reason: HOSPADM

## 2017-01-01 RX ORDER — MEGESTROL ACETATE 125 MG/ML
40 SUSPENSION ORAL
Status: ON HOLD | COMMUNITY
End: 2017-01-01 | Stop reason: ALTCHOICE

## 2017-01-01 RX ORDER — PSEUDOEPHEDRINE HCL 30 MG
100 TABLET ORAL DAILY
COMMUNITY
Start: 2017-01-01

## 2017-01-01 RX ORDER — LEVALBUTEROL INHALATION SOLUTION 1.25 MG/3ML
2.5 SOLUTION RESPIRATORY (INHALATION) EVERY 6 HOURS
Status: DISCONTINUED | OUTPATIENT
Start: 2017-01-01 | End: 2017-01-01 | Stop reason: HOSPADM

## 2017-01-01 RX ORDER — LORAZEPAM 2 MG/ML
1 INJECTION INTRAMUSCULAR
Status: CANCELLED | OUTPATIENT
Start: 2017-01-01

## 2017-01-01 RX ORDER — HYDROXYZINE PAMOATE 25 MG/1
25 CAPSULE ORAL NIGHTLY PRN
Status: DISCONTINUED | OUTPATIENT
Start: 2017-01-01 | End: 2017-01-01

## 2017-01-01 RX ORDER — ZOLPIDEM TARTRATE 5 MG/1
5 TABLET ORAL NIGHTLY PRN
COMMUNITY
End: 2017-01-01 | Stop reason: ALTCHOICE

## 2017-01-01 RX ORDER — ACETAMINOPHEN 325 MG/1
650 TABLET ORAL EVERY 4 HOURS PRN
Status: DISCONTINUED | OUTPATIENT
Start: 2017-01-01 | End: 2017-01-01 | Stop reason: HOSPADM

## 2017-01-01 RX ORDER — AZITHROMYCIN 250 MG/1
250 TABLET, FILM COATED ORAL DAILY
Status: DISCONTINUED | OUTPATIENT
Start: 2017-01-01 | End: 2017-01-01

## 2017-01-01 RX ORDER — GLYCOPYRROLATE 0.2 MG/ML
0.2 INJECTION INTRAMUSCULAR; INTRAVENOUS EVERY 6 HOURS PRN
Status: DISCONTINUED | OUTPATIENT
Start: 2017-01-01 | End: 2017-01-01 | Stop reason: HOSPADM

## 2017-01-01 RX ORDER — IPRATROPIUM BROMIDE AND ALBUTEROL SULFATE 2.5; .5 MG/3ML; MG/3ML
1 SOLUTION RESPIRATORY (INHALATION)
Status: DISCONTINUED | OUTPATIENT
Start: 2017-01-01 | End: 2017-01-01

## 2017-01-01 RX ORDER — LEVOFLOXACIN 500 MG/1
500 TABLET, FILM COATED ORAL EVERY 24 HOURS
Status: DISCONTINUED | OUTPATIENT
Start: 2017-01-01 | End: 2017-01-01 | Stop reason: HOSPADM

## 2017-01-01 RX ORDER — BUDESONIDE AND FORMOTEROL FUMARATE DIHYDRATE 80; 4.5 UG/1; UG/1
2 AEROSOL RESPIRATORY (INHALATION)
COMMUNITY
Start: 2017-01-01

## 2017-01-01 RX ORDER — PANTOPRAZOLE SODIUM 40 MG/1
40 TABLET, DELAYED RELEASE ORAL
Status: DISCONTINUED | OUTPATIENT
Start: 2017-01-01 | End: 2017-01-01 | Stop reason: HOSPADM

## 2017-01-01 RX ORDER — METHYLPREDNISOLONE SODIUM SUCCINATE 125 MG/2ML
60 INJECTION, POWDER, LYOPHILIZED, FOR SOLUTION INTRAMUSCULAR; INTRAVENOUS EVERY 6 HOURS
Status: DISCONTINUED | OUTPATIENT
Start: 2017-01-01 | End: 2017-01-01

## 2017-01-01 RX ORDER — DOCUSATE SODIUM 100 MG/1
100 CAPSULE, LIQUID FILLED ORAL DAILY
Status: DISCONTINUED | OUTPATIENT
Start: 2017-01-01 | End: 2017-01-01 | Stop reason: HOSPADM

## 2017-01-01 RX ORDER — LEVALBUTEROL 1.25 MG/.5ML
2.5 SOLUTION, CONCENTRATE RESPIRATORY (INHALATION)
COMMUNITY
Start: 2017-01-01

## 2017-01-01 RX ADMIN — METHYLPREDNISOLONE SODIUM SUCCINATE 60 MG: 125 INJECTION, POWDER, FOR SOLUTION INTRAMUSCULAR; INTRAVENOUS at 18:14

## 2017-01-01 RX ADMIN — METHYLPREDNISOLONE SODIUM SUCCINATE 40 MG: 40 INJECTION, POWDER, FOR SOLUTION INTRAMUSCULAR; INTRAVENOUS at 17:55

## 2017-01-01 RX ADMIN — FUROSEMIDE 40 MG: 10 INJECTION, SOLUTION INTRAMUSCULAR; INTRAVENOUS at 20:24

## 2017-01-01 RX ADMIN — INSULIN LISPRO 15 UNITS: 100 INJECTION, SOLUTION INTRAVENOUS; SUBCUTANEOUS at 18:09

## 2017-01-01 RX ADMIN — LORAZEPAM 1 MG: 2 INJECTION INTRAMUSCULAR; INTRAVENOUS at 00:32

## 2017-01-01 RX ADMIN — LEVALBUTEROL 1.25 MG: 1.25 SOLUTION RESPIRATORY (INHALATION) at 20:25

## 2017-01-01 RX ADMIN — IPRATROPIUM BROMIDE 0.5 MG: 0.5 SOLUTION RESPIRATORY (INHALATION) at 22:47

## 2017-01-01 RX ADMIN — GUAIFENESIN 1200 MG: 600 TABLET, EXTENDED RELEASE ORAL at 08:39

## 2017-01-01 RX ADMIN — LEVALBUTEROL HYDROCHLORIDE 1.25 MG: 1.25 SOLUTION RESPIRATORY (INHALATION) at 07:29

## 2017-01-01 RX ADMIN — IPRATROPIUM BROMIDE 0.5 MG: 0.5 SOLUTION RESPIRATORY (INHALATION) at 07:29

## 2017-01-01 RX ADMIN — POTASSIUM CHLORIDE 20 MEQ: 20 TABLET, EXTENDED RELEASE ORAL at 09:28

## 2017-01-01 RX ADMIN — Medication 10 ML: at 21:37

## 2017-01-01 RX ADMIN — METHYLPREDNISOLONE SODIUM SUCCINATE 40 MG: 40 INJECTION, POWDER, FOR SOLUTION INTRAMUSCULAR; INTRAVENOUS at 16:18

## 2017-01-01 RX ADMIN — INSULIN LISPRO 6 UNITS: 100 INJECTION, SOLUTION INTRAVENOUS; SUBCUTANEOUS at 20:39

## 2017-01-01 RX ADMIN — METHYLPREDNISOLONE SODIUM SUCCINATE 40 MG: 40 INJECTION, POWDER, FOR SOLUTION INTRAMUSCULAR; INTRAVENOUS at 01:33

## 2017-01-01 RX ADMIN — ZOLPIDEM TARTRATE 5 MG: 5 TABLET ORAL at 20:37

## 2017-01-01 RX ADMIN — SIMVASTATIN 40 MG: 20 TABLET, FILM COATED ORAL at 20:20

## 2017-01-01 RX ADMIN — METHYLPREDNISOLONE SODIUM SUCCINATE 40 MG: 40 INJECTION, POWDER, FOR SOLUTION INTRAMUSCULAR; INTRAVENOUS at 20:37

## 2017-01-01 RX ADMIN — Medication 10 ML: at 20:43

## 2017-01-01 RX ADMIN — GUAIFENESIN 600 MG: 600 TABLET, EXTENDED RELEASE ORAL at 21:02

## 2017-01-01 RX ADMIN — Medication 10 ML: at 08:49

## 2017-01-01 RX ADMIN — CEFEPIME 2 G: 2 INJECTION, POWDER, FOR SOLUTION INTRAMUSCULAR; INTRAVENOUS at 02:03

## 2017-01-01 RX ADMIN — ENOXAPARIN SODIUM 40 MG: 40 INJECTION SUBCUTANEOUS at 17:38

## 2017-01-01 RX ADMIN — IPRATROPIUM BROMIDE AND ALBUTEROL SULFATE 1 AMPULE: .5; 3 SOLUTION RESPIRATORY (INHALATION) at 18:18

## 2017-01-01 RX ADMIN — Medication 10 ML: at 20:24

## 2017-01-01 RX ADMIN — GUAIFENESIN 1200 MG: 600 TABLET, EXTENDED RELEASE ORAL at 20:37

## 2017-01-01 RX ADMIN — LEVOFLOXACIN 750 MG: 5 INJECTION, SOLUTION INTRAVENOUS at 11:39

## 2017-01-01 RX ADMIN — LORAZEPAM 1 MG: 2 INJECTION INTRAMUSCULAR; INTRAVENOUS at 06:13

## 2017-01-01 RX ADMIN — FOLIC ACID 1 MG: 1 TABLET ORAL at 08:48

## 2017-01-01 RX ADMIN — Medication 10 ML: at 20:39

## 2017-01-01 RX ADMIN — CEFEPIME 2 G: 2 INJECTION, POWDER, FOR SOLUTION INTRAMUSCULAR; INTRAVENOUS at 01:40

## 2017-01-01 RX ADMIN — INSULIN GLARGINE 10 UNITS: 100 INJECTION, SOLUTION SUBCUTANEOUS at 09:33

## 2017-01-01 RX ADMIN — INSULIN GLARGINE 15 UNITS: 100 INJECTION, SOLUTION SUBCUTANEOUS at 20:17

## 2017-01-01 RX ADMIN — SIMVASTATIN 40 MG: 20 TABLET, FILM COATED ORAL at 21:14

## 2017-01-01 RX ADMIN — IPRATROPIUM BROMIDE 0.5 MG: 0.5 SOLUTION RESPIRATORY (INHALATION) at 07:05

## 2017-01-01 RX ADMIN — PANTOPRAZOLE SODIUM 40 MG: 40 TABLET, DELAYED RELEASE ORAL at 05:08

## 2017-01-01 RX ADMIN — LEVALBUTEROL 1.25 MG: 1.25 SOLUTION RESPIRATORY (INHALATION) at 14:56

## 2017-01-01 RX ADMIN — CEFEPIME 2 G: 2 INJECTION, POWDER, FOR SOLUTION INTRAMUSCULAR; INTRAVENOUS at 03:06

## 2017-01-01 RX ADMIN — METHYLPREDNISOLONE SODIUM SUCCINATE 40 MG: 40 INJECTION, POWDER, FOR SOLUTION INTRAMUSCULAR; INTRAVENOUS at 09:28

## 2017-01-01 RX ADMIN — INSULIN LISPRO 9 UNITS: 100 INJECTION, SOLUTION INTRAVENOUS; SUBCUTANEOUS at 20:53

## 2017-01-01 RX ADMIN — GUAIFENESIN 1200 MG: 600 TABLET, EXTENDED RELEASE ORAL at 20:57

## 2017-01-01 RX ADMIN — IPRATROPIUM BROMIDE AND ALBUTEROL SULFATE 1 AMPULE: .5; 3 SOLUTION RESPIRATORY (INHALATION) at 06:53

## 2017-01-01 RX ADMIN — FOLIC ACID 1 MG: 1 TABLET ORAL at 09:12

## 2017-01-01 RX ADMIN — FUROSEMIDE 40 MG: 10 INJECTION, SOLUTION INTRAMUSCULAR; INTRAVENOUS at 09:35

## 2017-01-01 RX ADMIN — IPRATROPIUM BROMIDE 0.5 MG: 0.5 SOLUTION RESPIRATORY (INHALATION) at 10:28

## 2017-01-01 RX ADMIN — LEVALBUTEROL HYDROCHLORIDE 1.25 MG: 1.25 SOLUTION RESPIRATORY (INHALATION) at 22:27

## 2017-01-01 RX ADMIN — IPRATROPIUM BROMIDE 0.5 MG: 0.5 SOLUTION RESPIRATORY (INHALATION) at 06:29

## 2017-01-01 RX ADMIN — ENOXAPARIN SODIUM 40 MG: 40 INJECTION SUBCUTANEOUS at 18:14

## 2017-01-01 RX ADMIN — FOLIC ACID 1 MG: 1 TABLET ORAL at 09:11

## 2017-01-01 RX ADMIN — LEVALBUTEROL HYDROCHLORIDE 1.25 MG: 1.25 SOLUTION RESPIRATORY (INHALATION) at 14:56

## 2017-01-01 RX ADMIN — LEVALBUTEROL HYDROCHLORIDE 1.25 MG: 1.25 SOLUTION RESPIRATORY (INHALATION) at 07:02

## 2017-01-01 RX ADMIN — INSULIN GLARGINE 20 UNITS: 100 INJECTION, SOLUTION SUBCUTANEOUS at 20:38

## 2017-01-01 RX ADMIN — LEVALBUTEROL HYDROCHLORIDE 1.25 MG: 1.25 SOLUTION RESPIRATORY (INHALATION) at 06:30

## 2017-01-01 RX ADMIN — IPRATROPIUM BROMIDE AND ALBUTEROL SULFATE 1 AMPULE: .5; 3 SOLUTION RESPIRATORY (INHALATION) at 06:42

## 2017-01-01 RX ADMIN — LORAZEPAM 1 MG: 2 INJECTION INTRAMUSCULAR; INTRAVENOUS at 14:19

## 2017-01-01 RX ADMIN — FUROSEMIDE 40 MG: 10 INJECTION, SOLUTION INTRAMUSCULAR; INTRAVENOUS at 08:39

## 2017-01-01 RX ADMIN — CEFEPIME 2 G: 2 INJECTION, POWDER, FOR SOLUTION INTRAMUSCULAR; INTRAVENOUS at 18:00

## 2017-01-01 RX ADMIN — SODIUM CHLORIDE: 9 INJECTION, SOLUTION INTRAVENOUS at 11:39

## 2017-01-01 RX ADMIN — LEVALBUTEROL HYDROCHLORIDE 1.25 MG: 1.25 SOLUTION RESPIRATORY (INHALATION) at 14:20

## 2017-01-01 RX ADMIN — GUAIFENESIN 1200 MG: 600 TABLET, EXTENDED RELEASE ORAL at 09:35

## 2017-01-01 RX ADMIN — LEVALBUTEROL 2.5 MG: 1.25 SOLUTION RESPIRATORY (INHALATION) at 02:55

## 2017-01-01 RX ADMIN — METHYLPREDNISOLONE SODIUM SUCCINATE 60 MG: 125 INJECTION, POWDER, FOR SOLUTION INTRAMUSCULAR; INTRAVENOUS at 18:34

## 2017-01-01 RX ADMIN — LORAZEPAM 1 MG: 2 INJECTION INTRAMUSCULAR; INTRAVENOUS at 21:35

## 2017-01-01 RX ADMIN — IOVERSOL 90 ML: 741 INJECTION INTRA-ARTERIAL; INTRAVENOUS at 09:28

## 2017-01-01 RX ADMIN — FUROSEMIDE 40 MG: 10 INJECTION, SOLUTION INTRAMUSCULAR; INTRAVENOUS at 21:22

## 2017-01-01 RX ADMIN — LEVALBUTEROL HYDROCHLORIDE 1.25 MG: 1.25 SOLUTION RESPIRATORY (INHALATION) at 18:37

## 2017-01-01 RX ADMIN — MORPHINE SULFATE 2 MG: 4 INJECTION, SOLUTION INTRAMUSCULAR; INTRAVENOUS at 09:21

## 2017-01-01 RX ADMIN — CEFEPIME HYDROCHLORIDE 2 G: 2 INJECTION, SOLUTION INTRAVENOUS at 15:45

## 2017-01-01 RX ADMIN — CEFEPIME 2 G: 2 INJECTION, POWDER, FOR SOLUTION INTRAMUSCULAR; INTRAVENOUS at 17:38

## 2017-01-01 RX ADMIN — METHYLPREDNISOLONE SODIUM SUCCINATE 40 MG: 40 INJECTION, POWDER, FOR SOLUTION INTRAMUSCULAR; INTRAVENOUS at 05:46

## 2017-01-01 RX ADMIN — GUAIFENESIN 1200 MG: 600 TABLET, EXTENDED RELEASE ORAL at 20:23

## 2017-01-01 RX ADMIN — IPRATROPIUM BROMIDE 0.5 MG: 0.5 SOLUTION RESPIRATORY (INHALATION) at 14:56

## 2017-01-01 RX ADMIN — MORPHINE SULFATE 2 MG: 4 INJECTION, SOLUTION INTRAMUSCULAR; INTRAVENOUS at 22:00

## 2017-01-01 RX ADMIN — CEFEPIME 2 G: 2 INJECTION, POWDER, FOR SOLUTION INTRAMUSCULAR; INTRAVENOUS at 10:33

## 2017-01-01 RX ADMIN — FUROSEMIDE 40 MG: 10 INJECTION, SOLUTION INTRAMUSCULAR; INTRAVENOUS at 13:15

## 2017-01-01 RX ADMIN — INSULIN LISPRO 12 UNITS: 100 INJECTION, SOLUTION INTRAVENOUS; SUBCUTANEOUS at 17:56

## 2017-01-01 RX ADMIN — ENOXAPARIN SODIUM 40 MG: 40 INJECTION SUBCUTANEOUS at 16:18

## 2017-01-01 RX ADMIN — VANCOMYCIN HYDROCHLORIDE 1000 MG: 1 INJECTION, SOLUTION INTRAVENOUS at 02:21

## 2017-01-01 RX ADMIN — DOCUSATE SODIUM 100 MG: 100 CAPSULE, LIQUID FILLED ORAL at 08:39

## 2017-01-01 RX ADMIN — INSULIN GLARGINE 20 UNITS: 100 INJECTION, SOLUTION SUBCUTANEOUS at 22:01

## 2017-01-01 RX ADMIN — MOMETASONE FUROATE AND FORMOTEROL FUMARATE DIHYDRATE 2 PUFF: 100; 5 AEROSOL RESPIRATORY (INHALATION) at 21:20

## 2017-01-01 RX ADMIN — METHYLPREDNISOLONE SODIUM SUCCINATE 60 MG: 40 INJECTION, POWDER, FOR SOLUTION INTRAMUSCULAR; INTRAVENOUS at 13:30

## 2017-01-01 RX ADMIN — PANTOPRAZOLE SODIUM 40 MG: 40 TABLET, DELAYED RELEASE ORAL at 09:35

## 2017-01-01 RX ADMIN — IPRATROPIUM BROMIDE 0.5 MG: 0.5 SOLUTION RESPIRATORY (INHALATION) at 02:33

## 2017-01-01 RX ADMIN — CEFEPIME 2 G: 2 INJECTION, POWDER, FOR SOLUTION INTRAMUSCULAR; INTRAVENOUS at 18:18

## 2017-01-01 RX ADMIN — INSULIN GLARGINE 20 UNITS: 100 INJECTION, SOLUTION SUBCUTANEOUS at 09:14

## 2017-01-01 RX ADMIN — LEVALBUTEROL HYDROCHLORIDE 1.25 MG: 1.25 SOLUTION RESPIRATORY (INHALATION) at 02:34

## 2017-01-01 RX ADMIN — DOCUSATE SODIUM 100 MG: 100 CAPSULE, LIQUID FILLED ORAL at 21:02

## 2017-01-01 RX ADMIN — LEVOFLOXACIN 750 MG: 5 INJECTION, SOLUTION INTRAVENOUS at 20:09

## 2017-01-01 RX ADMIN — LEVALBUTEROL HYDROCHLORIDE 1.25 MG: 1.25 SOLUTION RESPIRATORY (INHALATION) at 02:25

## 2017-01-01 RX ADMIN — FOLIC ACID 1 MG: 1 TABLET ORAL at 09:44

## 2017-01-01 RX ADMIN — CEFEPIME 2 G: 2 INJECTION, POWDER, FOR SOLUTION INTRAMUSCULAR; INTRAVENOUS at 01:50

## 2017-01-01 RX ADMIN — ATROPINE SULFATE 2 DROP: 10 SOLUTION/ DROPS OPHTHALMIC at 06:14

## 2017-01-01 RX ADMIN — LEVOFLOXACIN 750 MG: 5 INJECTION, SOLUTION INTRAVENOUS at 19:01

## 2017-01-01 RX ADMIN — CEFEPIME 2 G: 2 INJECTION, POWDER, FOR SOLUTION INTRAMUSCULAR; INTRAVENOUS at 09:42

## 2017-01-01 RX ADMIN — CEFEPIME 2 G: 2 INJECTION, POWDER, FOR SOLUTION INTRAMUSCULAR; INTRAVENOUS at 01:33

## 2017-01-01 RX ADMIN — MORPHINE SULFATE 1 MG/HR: 10 INJECTION INTRAVENOUS at 08:49

## 2017-01-01 RX ADMIN — INSULIN LISPRO 3 UNITS: 100 INJECTION, SOLUTION INTRAVENOUS; SUBCUTANEOUS at 09:11

## 2017-01-01 RX ADMIN — METHYLPREDNISOLONE SODIUM SUCCINATE 40 MG: 40 INJECTION, POWDER, FOR SOLUTION INTRAMUSCULAR; INTRAVENOUS at 06:14

## 2017-01-01 RX ADMIN — CEFEPIME 2 G: 2 INJECTION, POWDER, FOR SOLUTION INTRAMUSCULAR; INTRAVENOUS at 10:18

## 2017-01-01 RX ADMIN — IPRATROPIUM BROMIDE 0.5 MG: 0.5 SOLUTION RESPIRATORY (INHALATION) at 11:19

## 2017-01-01 RX ADMIN — FOLIC ACID 1 MG: 1 TABLET ORAL at 08:39

## 2017-01-01 RX ADMIN — IPRATROPIUM BROMIDE 0.5 MG: 0.5 SOLUTION RESPIRATORY (INHALATION) at 02:40

## 2017-01-01 RX ADMIN — IPRATROPIUM BROMIDE AND ALBUTEROL SULFATE 1 AMPULE: .5; 3 SOLUTION RESPIRATORY (INHALATION) at 06:15

## 2017-01-01 RX ADMIN — CEFEPIME 2 G: 2 INJECTION, POWDER, FOR SOLUTION INTRAMUSCULAR; INTRAVENOUS at 09:50

## 2017-01-01 RX ADMIN — LEVOFLOXACIN 750 MG: 5 INJECTION, SOLUTION INTRAVENOUS at 18:15

## 2017-01-01 RX ADMIN — CEFEPIME 2 G: 2 INJECTION, POWDER, FOR SOLUTION INTRAMUSCULAR; INTRAVENOUS at 09:10

## 2017-01-01 RX ADMIN — IPRATROPIUM BROMIDE AND ALBUTEROL SULFATE 1 AMPULE: .5; 3 SOLUTION RESPIRATORY (INHALATION) at 14:47

## 2017-01-01 RX ADMIN — IPRATROPIUM BROMIDE 0.5 MG: 0.5 SOLUTION RESPIRATORY (INHALATION) at 14:47

## 2017-01-01 RX ADMIN — METHYLPREDNISOLONE SODIUM SUCCINATE 40 MG: 40 INJECTION, POWDER, FOR SOLUTION INTRAMUSCULAR; INTRAVENOUS at 12:59

## 2017-01-01 RX ADMIN — GUAIFENESIN 1200 MG: 600 TABLET, EXTENDED RELEASE ORAL at 21:22

## 2017-01-01 RX ADMIN — ENOXAPARIN SODIUM 40 MG: 40 INJECTION SUBCUTANEOUS at 17:51

## 2017-01-01 RX ADMIN — HYDROXYZINE PAMOATE 25 MG: 25 CAPSULE ORAL at 23:06

## 2017-01-01 RX ADMIN — LEVALBUTEROL 1.25 MG: 1.25 SOLUTION RESPIRATORY (INHALATION) at 07:42

## 2017-01-01 RX ADMIN — IPRATROPIUM BROMIDE 0.5 MG: 0.5 SOLUTION RESPIRATORY (INHALATION) at 14:20

## 2017-01-01 RX ADMIN — FUROSEMIDE 40 MG: 10 INJECTION, SOLUTION INTRAMUSCULAR; INTRAVENOUS at 20:56

## 2017-01-01 RX ADMIN — POTASSIUM CHLORIDE 20 MEQ: 20 TABLET, EXTENDED RELEASE ORAL at 09:11

## 2017-01-01 RX ADMIN — Medication 10 ML: at 09:32

## 2017-01-01 RX ADMIN — CEFEPIME HYDROCHLORIDE 2 G: 2 INJECTION, SOLUTION INTRAVENOUS at 03:35

## 2017-01-01 RX ADMIN — DOCUSATE SODIUM 100 MG: 100 CAPSULE, LIQUID FILLED ORAL at 08:26

## 2017-01-01 RX ADMIN — CEFEPIME 2 G: 2 INJECTION, POWDER, FOR SOLUTION INTRAMUSCULAR; INTRAVENOUS at 18:08

## 2017-01-01 RX ADMIN — CEFEPIME 2 G: 2 INJECTION, POWDER, FOR SOLUTION INTRAMUSCULAR; INTRAVENOUS at 16:30

## 2017-01-01 RX ADMIN — METHYLPREDNISOLONE SODIUM SUCCINATE 60 MG: 125 INJECTION, POWDER, FOR SOLUTION INTRAMUSCULAR; INTRAVENOUS at 08:39

## 2017-01-01 RX ADMIN — LEVOFLOXACIN 500 MG: 500 TABLET, FILM COATED ORAL at 20:37

## 2017-01-01 RX ADMIN — SIMVASTATIN 40 MG: 20 TABLET, FILM COATED ORAL at 20:57

## 2017-01-01 RX ADMIN — LEVOFLOXACIN 500 MG: 500 TABLET, FILM COATED ORAL at 21:22

## 2017-01-01 RX ADMIN — IPRATROPIUM BROMIDE 0.5 MG: 0.5 SOLUTION RESPIRATORY (INHALATION) at 02:24

## 2017-01-01 RX ADMIN — VANCOMYCIN HYDROCHLORIDE 1000 MG: 1 INJECTION, SOLUTION INTRAVENOUS at 14:51

## 2017-01-01 RX ADMIN — INSULIN LISPRO 6 UNITS: 100 INJECTION, SOLUTION INTRAVENOUS; SUBCUTANEOUS at 22:00

## 2017-01-01 RX ADMIN — FOLIC ACID 1 MG: 1 TABLET ORAL at 09:14

## 2017-01-01 RX ADMIN — IPRATROPIUM BROMIDE AND ALBUTEROL SULFATE 1 AMPULE: .5; 3 SOLUTION RESPIRATORY (INHALATION) at 13:02

## 2017-01-01 RX ADMIN — MOMETASONE FUROATE AND FORMOTEROL FUMARATE DIHYDRATE 2 PUFF: 100; 5 AEROSOL RESPIRATORY (INHALATION) at 08:48

## 2017-01-01 RX ADMIN — HYDROXYZINE PAMOATE 25 MG: 25 CAPSULE ORAL at 20:19

## 2017-01-01 RX ADMIN — SODIUM CHLORIDE: 9 INJECTION, SOLUTION INTRAVENOUS at 12:45

## 2017-01-01 RX ADMIN — DOCUSATE SODIUM 100 MG: 100 CAPSULE, LIQUID FILLED ORAL at 09:35

## 2017-01-01 RX ADMIN — HALOPERIDOL LACTATE 2 MG: 5 INJECTION INTRAMUSCULAR at 23:57

## 2017-01-01 RX ADMIN — IPRATROPIUM BROMIDE AND ALBUTEROL SULFATE 1 AMPULE: .5; 3 SOLUTION RESPIRATORY (INHALATION) at 10:45

## 2017-01-01 RX ADMIN — POTASSIUM CHLORIDE 20 MEQ: 20 TABLET, EXTENDED RELEASE ORAL at 08:39

## 2017-01-01 RX ADMIN — IPRATROPIUM BROMIDE 0.5 MG: 0.5 SOLUTION RESPIRATORY (INHALATION) at 11:57

## 2017-01-01 RX ADMIN — IPRATROPIUM BROMIDE 0.5 MG: 0.5 SOLUTION RESPIRATORY (INHALATION) at 18:36

## 2017-01-01 RX ADMIN — INSULIN LISPRO 9 UNITS: 100 INJECTION, SOLUTION INTRAVENOUS; SUBCUTANEOUS at 11:40

## 2017-01-01 RX ADMIN — DOCUSATE SODIUM 100 MG: 100 CAPSULE, LIQUID FILLED ORAL at 09:28

## 2017-01-01 RX ADMIN — METHYLPREDNISOLONE SODIUM SUCCINATE 40 MG: 40 INJECTION, POWDER, FOR SOLUTION INTRAMUSCULAR; INTRAVENOUS at 21:14

## 2017-01-01 RX ADMIN — METHYLPREDNISOLONE SODIUM SUCCINATE 60 MG: 125 INJECTION, POWDER, FOR SOLUTION INTRAMUSCULAR; INTRAVENOUS at 01:50

## 2017-01-01 RX ADMIN — FUROSEMIDE 40 MG: 10 INJECTION INTRAMUSCULAR; INTRAVENOUS at 13:15

## 2017-01-01 RX ADMIN — METHYLPREDNISOLONE SODIUM SUCCINATE 60 MG: 125 INJECTION, POWDER, FOR SOLUTION INTRAMUSCULAR; INTRAVENOUS at 02:04

## 2017-01-01 RX ADMIN — LEVALBUTEROL HYDROCHLORIDE 1.25 MG: 1.25 SOLUTION RESPIRATORY (INHALATION) at 22:28

## 2017-01-01 RX ADMIN — IPRATROPIUM BROMIDE AND ALBUTEROL SULFATE 1 AMPULE: .5; 3 SOLUTION RESPIRATORY (INHALATION) at 11:01

## 2017-01-01 RX ADMIN — IPRATROPIUM BROMIDE AND ALBUTEROL SULFATE 1 AMPULE: .5; 3 SOLUTION RESPIRATORY (INHALATION) at 06:58

## 2017-01-01 RX ADMIN — SIMVASTATIN 40 MG: 20 TABLET, FILM COATED ORAL at 20:37

## 2017-01-01 RX ADMIN — CEFEPIME 2 G: 2 INJECTION, POWDER, FOR SOLUTION INTRAMUSCULAR; INTRAVENOUS at 02:05

## 2017-01-01 RX ADMIN — IPRATROPIUM BROMIDE AND ALBUTEROL SULFATE 1 AMPULE: .5; 3 SOLUTION RESPIRATORY (INHALATION) at 10:09

## 2017-01-01 RX ADMIN — CEFEPIME 2 G: 2 INJECTION, POWDER, FOR SOLUTION INTRAMUSCULAR; INTRAVENOUS at 09:17

## 2017-01-01 RX ADMIN — Medication 10 ML: at 00:33

## 2017-01-01 RX ADMIN — ENOXAPARIN SODIUM 40 MG: 40 INJECTION SUBCUTANEOUS at 18:18

## 2017-01-01 RX ADMIN — Medication 10 ML: at 21:03

## 2017-01-01 RX ADMIN — LISINOPRIL 20 MG: 20 TABLET ORAL at 09:43

## 2017-01-01 RX ADMIN — CEFEPIME 2 G: 2 INJECTION, POWDER, FOR SOLUTION INTRAMUSCULAR; INTRAVENOUS at 02:35

## 2017-01-01 RX ADMIN — LEVALBUTEROL HYDROCHLORIDE 1.25 MG: 1.25 SOLUTION RESPIRATORY (INHALATION) at 10:29

## 2017-01-01 RX ADMIN — METHYLPREDNISOLONE SODIUM SUCCINATE 40 MG: 40 INJECTION, POWDER, FOR SOLUTION INTRAMUSCULAR; INTRAVENOUS at 09:12

## 2017-01-01 RX ADMIN — METHYLPREDNISOLONE SODIUM SUCCINATE 40 MG: 40 INJECTION, POWDER, FOR SOLUTION INTRAMUSCULAR; INTRAVENOUS at 18:09

## 2017-01-01 RX ADMIN — GUAIFENESIN 600 MG: 600 TABLET, EXTENDED RELEASE ORAL at 09:11

## 2017-01-01 RX ADMIN — GUAIFENESIN 1200 MG: 600 TABLET, EXTENDED RELEASE ORAL at 09:43

## 2017-01-01 RX ADMIN — GUAIFENESIN 1200 MG: 600 TABLET, EXTENDED RELEASE ORAL at 08:25

## 2017-01-01 RX ADMIN — LISINOPRIL 20 MG: 20 TABLET ORAL at 08:39

## 2017-01-01 RX ADMIN — POTASSIUM CHLORIDE 20 MEQ: 20 TABLET, EXTENDED RELEASE ORAL at 10:03

## 2017-01-01 RX ADMIN — IPRATROPIUM BROMIDE AND ALBUTEROL SULFATE 1 AMPULE: .5; 3 SOLUTION RESPIRATORY (INHALATION) at 19:11

## 2017-01-01 RX ADMIN — PANTOPRAZOLE SODIUM 40 MG: 40 TABLET, DELAYED RELEASE ORAL at 21:13

## 2017-01-01 RX ADMIN — DOCUSATE SODIUM 100 MG: 100 CAPSULE, LIQUID FILLED ORAL at 09:14

## 2017-01-01 RX ADMIN — FOLIC ACID 1 MG: 1 TABLET ORAL at 18:16

## 2017-01-01 RX ADMIN — GUAIFENESIN 1200 MG: 600 TABLET, EXTENDED RELEASE ORAL at 09:28

## 2017-01-01 RX ADMIN — POTASSIUM CHLORIDE 20 MEQ: 20 TABLET, EXTENDED RELEASE ORAL at 20:23

## 2017-01-01 RX ADMIN — GUAIFENESIN 1200 MG: 600 TABLET, EXTENDED RELEASE ORAL at 20:19

## 2017-01-01 RX ADMIN — METHYLPREDNISOLONE SODIUM SUCCINATE 40 MG: 40 INJECTION, POWDER, FOR SOLUTION INTRAMUSCULAR; INTRAVENOUS at 00:43

## 2017-01-01 RX ADMIN — GUAIFENESIN 1200 MG: 600 TABLET, EXTENDED RELEASE ORAL at 09:11

## 2017-01-01 RX ADMIN — METHYLPREDNISOLONE SODIUM SUCCINATE 40 MG: 40 INJECTION, POWDER, FOR SOLUTION INTRAMUSCULAR; INTRAVENOUS at 11:24

## 2017-01-01 RX ADMIN — FOLIC ACID 1 MG: 1 TABLET ORAL at 09:35

## 2017-01-01 RX ADMIN — POTASSIUM CHLORIDE 20 MEQ: 20 TABLET, EXTENDED RELEASE ORAL at 09:35

## 2017-01-01 RX ADMIN — FUROSEMIDE 40 MG: 40 TABLET ORAL at 09:28

## 2017-01-01 RX ADMIN — IPRATROPIUM BROMIDE AND ALBUTEROL SULFATE 1 AMPULE: .5; 3 SOLUTION RESPIRATORY (INHALATION) at 10:32

## 2017-01-01 RX ADMIN — LEVALBUTEROL HYDROCHLORIDE 1.25 MG: 1.25 SOLUTION RESPIRATORY (INHALATION) at 19:10

## 2017-01-01 RX ADMIN — POTASSIUM CHLORIDE 20 MEQ: 20 TABLET, EXTENDED RELEASE ORAL at 20:37

## 2017-01-01 RX ADMIN — LEVALBUTEROL HYDROCHLORIDE 1.25 MG: 1.25 SOLUTION RESPIRATORY (INHALATION) at 18:24

## 2017-01-01 RX ADMIN — ALBUTEROL SULFATE 2.5 MG: 2.5 SOLUTION RESPIRATORY (INHALATION) at 11:57

## 2017-01-01 RX ADMIN — SIMVASTATIN 40 MG: 20 TABLET, FILM COATED ORAL at 21:02

## 2017-01-01 RX ADMIN — LEVALBUTEROL HYDROCHLORIDE 1.25 MG: 1.25 SOLUTION RESPIRATORY (INHALATION) at 11:19

## 2017-01-01 RX ADMIN — LORAZEPAM 1 MG: 2 INJECTION INTRAMUSCULAR; INTRAVENOUS at 10:45

## 2017-01-01 RX ADMIN — LEVALBUTEROL HYDROCHLORIDE 1.25 MG: 1.25 SOLUTION RESPIRATORY (INHALATION) at 19:07

## 2017-01-01 RX ADMIN — LEVALBUTEROL HYDROCHLORIDE 1.25 MG: 1.25 SOLUTION RESPIRATORY (INHALATION) at 07:13

## 2017-01-01 RX ADMIN — FUROSEMIDE 40 MG: 10 INJECTION, SOLUTION INTRAMUSCULAR; INTRAVENOUS at 09:12

## 2017-01-01 RX ADMIN — LEVALBUTEROL HYDROCHLORIDE 1.25 MG: 1.25 SOLUTION RESPIRATORY (INHALATION) at 10:32

## 2017-01-01 RX ADMIN — IPRATROPIUM BROMIDE AND ALBUTEROL SULFATE 1 AMPULE: .5; 3 SOLUTION RESPIRATORY (INHALATION) at 20:00

## 2017-01-01 RX ADMIN — LEVALBUTEROL HYDROCHLORIDE 1.25 MG: 1.25 SOLUTION RESPIRATORY (INHALATION) at 03:09

## 2017-01-01 RX ADMIN — INSULIN LISPRO 9 UNITS: 100 INJECTION, SOLUTION INTRAVENOUS; SUBCUTANEOUS at 09:36

## 2017-01-01 RX ADMIN — LEVOFLOXACIN 500 MG: 500 TABLET, FILM COATED ORAL at 21:02

## 2017-01-01 RX ADMIN — SIMVASTATIN 40 MG: 20 TABLET, FILM COATED ORAL at 20:23

## 2017-01-01 RX ADMIN — Medication 10 ML: at 08:40

## 2017-01-01 RX ADMIN — IPRATROPIUM BROMIDE 0.5 MG: 0.5 SOLUTION RESPIRATORY (INHALATION) at 18:25

## 2017-01-01 RX ADMIN — SIMVASTATIN 40 MG: 20 TABLET, FILM COATED ORAL at 20:17

## 2017-01-01 RX ADMIN — LEVALBUTEROL HYDROCHLORIDE 1.25 MG: 1.25 SOLUTION RESPIRATORY (INHALATION) at 02:40

## 2017-01-01 RX ADMIN — HYDROXYZINE PAMOATE 25 MG: 25 CAPSULE ORAL at 21:14

## 2017-01-01 RX ADMIN — LEVALBUTEROL HYDROCHLORIDE 1.25 MG: 1.25 SOLUTION RESPIRATORY (INHALATION) at 10:41

## 2017-01-01 RX ADMIN — CEFEPIME 2 G: 2 INJECTION, POWDER, FOR SOLUTION INTRAMUSCULAR; INTRAVENOUS at 17:51

## 2017-01-01 RX ADMIN — FOLIC ACID 1 MG: 1 TABLET ORAL at 08:26

## 2017-01-01 RX ADMIN — METHYLPREDNISOLONE SODIUM SUCCINATE 40 MG: 40 INJECTION, POWDER, FOR SOLUTION INTRAMUSCULAR; INTRAVENOUS at 02:35

## 2017-01-01 RX ADMIN — METHYLPREDNISOLONE SODIUM SUCCINATE 40 MG: 40 INJECTION, POWDER, FOR SOLUTION INTRAMUSCULAR; INTRAVENOUS at 00:56

## 2017-01-01 RX ADMIN — Medication 10 ML: at 22:02

## 2017-01-01 RX ADMIN — IPRATROPIUM BROMIDE 0.5 MG: 0.5 SOLUTION RESPIRATORY (INHALATION) at 03:08

## 2017-01-01 RX ADMIN — GUAIFENESIN 1200 MG: 600 TABLET, EXTENDED RELEASE ORAL at 20:16

## 2017-01-01 RX ADMIN — ENOXAPARIN SODIUM 40 MG: 40 INJECTION SUBCUTANEOUS at 18:16

## 2017-01-01 RX ADMIN — CEFTRIAXONE 1 G: 1 INJECTION, POWDER, FOR SOLUTION INTRAMUSCULAR; INTRAVENOUS at 12:30

## 2017-01-01 RX ADMIN — LEVOFLOXACIN 500 MG: 500 TABLET, FILM COATED ORAL at 20:17

## 2017-01-01 RX ADMIN — IPRATROPIUM BROMIDE 0.5 MG: 0.5 SOLUTION RESPIRATORY (INHALATION) at 07:13

## 2017-01-01 RX ADMIN — Medication 10 ML: at 21:24

## 2017-01-01 RX ADMIN — SIMVASTATIN 40 MG: 20 TABLET, FILM COATED ORAL at 21:31

## 2017-01-01 RX ADMIN — LISINOPRIL 20 MG: 20 TABLET ORAL at 09:11

## 2017-01-01 RX ADMIN — IPRATROPIUM BROMIDE 0.5 MG: 0.5 SOLUTION RESPIRATORY (INHALATION) at 10:41

## 2017-01-01 RX ADMIN — IPRATROPIUM BROMIDE AND ALBUTEROL SULFATE 1 AMPULE: .5; 3 SOLUTION RESPIRATORY (INHALATION) at 14:06

## 2017-01-01 RX ADMIN — IPRATROPIUM BROMIDE 0.5 MG: 0.5 SOLUTION RESPIRATORY (INHALATION) at 10:32

## 2017-01-01 RX ADMIN — CEFEPIME 2 G: 2 INJECTION, POWDER, FOR SOLUTION INTRAMUSCULAR; INTRAVENOUS at 02:01

## 2017-01-01 RX ADMIN — LEVALBUTEROL HYDROCHLORIDE 1.25 MG: 1.25 SOLUTION RESPIRATORY (INHALATION) at 18:50

## 2017-01-01 RX ADMIN — MOMETASONE FUROATE AND FORMOTEROL FUMARATE DIHYDRATE 2 PUFF: 100; 5 AEROSOL RESPIRATORY (INHALATION) at 16:01

## 2017-01-01 RX ADMIN — LEVALBUTEROL HYDROCHLORIDE 1.25 MG: 1.25 SOLUTION RESPIRATORY (INHALATION) at 22:47

## 2017-01-01 RX ADMIN — INSULIN LISPRO 18 UNITS: 100 INJECTION, SOLUTION INTRAVENOUS; SUBCUTANEOUS at 11:12

## 2017-01-01 RX ADMIN — POTASSIUM CHLORIDE 20 MEQ: 20 TABLET, EXTENDED RELEASE ORAL at 20:57

## 2017-01-01 RX ADMIN — AZITHROMYCIN MONOHYDRATE 500 MG: 500 INJECTION, POWDER, LYOPHILIZED, FOR SOLUTION INTRAVENOUS at 12:44

## 2017-01-01 RX ADMIN — MORPHINE SULFATE 2 MG: 4 INJECTION, SOLUTION INTRAMUSCULAR; INTRAVENOUS at 18:58

## 2017-01-01 RX ADMIN — INSULIN LISPRO 15 UNITS: 100 INJECTION, SOLUTION INTRAVENOUS; SUBCUTANEOUS at 13:52

## 2017-01-01 RX ADMIN — IPRATROPIUM BROMIDE 0.5 MG: 0.5 SOLUTION RESPIRATORY (INHALATION) at 22:42

## 2017-01-01 RX ADMIN — CEFEPIME 2 G: 2 INJECTION, POWDER, FOR SOLUTION INTRAMUSCULAR; INTRAVENOUS at 17:45

## 2017-01-01 RX ADMIN — POTASSIUM CHLORIDE 20 MEQ: 20 TABLET, EXTENDED RELEASE ORAL at 20:17

## 2017-01-01 RX ADMIN — ENOXAPARIN SODIUM 40 MG: 40 INJECTION SUBCUTANEOUS at 17:55

## 2017-01-01 RX ADMIN — METHYLPREDNISOLONE SODIUM SUCCINATE 60 MG: 125 INJECTION, POWDER, FOR SOLUTION INTRAMUSCULAR; INTRAVENOUS at 12:46

## 2017-01-01 RX ADMIN — FOLIC ACID 1 MG: 1 TABLET ORAL at 09:28

## 2017-01-01 RX ADMIN — IPRATROPIUM BROMIDE 0.5 MG: 0.5 SOLUTION RESPIRATORY (INHALATION) at 19:10

## 2017-01-01 RX ADMIN — GUAIFENESIN 1200 MG: 600 TABLET, EXTENDED RELEASE ORAL at 09:14

## 2017-01-01 RX ADMIN — POTASSIUM CHLORIDE 20 MEQ: 20 TABLET, EXTENDED RELEASE ORAL at 17:54

## 2017-01-01 RX ADMIN — LISINOPRIL 20 MG: 20 TABLET ORAL at 09:14

## 2017-01-01 RX ADMIN — LEVALBUTEROL HYDROCHLORIDE 1.25 MG: 1.25 SOLUTION RESPIRATORY (INHALATION) at 14:47

## 2017-01-01 RX ADMIN — METHYLPREDNISOLONE SODIUM SUCCINATE 60 MG: 125 INJECTION, POWDER, FOR SOLUTION INTRAMUSCULAR; INTRAVENOUS at 09:35

## 2017-01-01 RX ADMIN — IPRATROPIUM BROMIDE AND ALBUTEROL SULFATE 1 AMPULE: .5; 3 SOLUTION RESPIRATORY (INHALATION) at 14:40

## 2017-01-01 RX ADMIN — METHYLPREDNISOLONE SODIUM SUCCINATE 40 MG: 40 INJECTION, POWDER, FOR SOLUTION INTRAMUSCULAR; INTRAVENOUS at 17:51

## 2017-01-01 RX ADMIN — LEVOFLOXACIN 500 MG: 500 TABLET, FILM COATED ORAL at 20:24

## 2017-01-01 RX ADMIN — PANTOPRAZOLE SODIUM 40 MG: 40 TABLET, DELAYED RELEASE ORAL at 06:30

## 2017-01-01 RX ADMIN — IPRATROPIUM BROMIDE AND ALBUTEROL SULFATE 1 AMPULE: .5; 3 SOLUTION RESPIRATORY (INHALATION) at 10:51

## 2017-01-01 RX ADMIN — IPRATROPIUM BROMIDE 0.5 MG: 0.5 SOLUTION RESPIRATORY (INHALATION) at 14:14

## 2017-01-01 RX ADMIN — DOCUSATE SODIUM 100 MG: 100 CAPSULE, LIQUID FILLED ORAL at 09:11

## 2017-01-01 RX ADMIN — IPRATROPIUM BROMIDE 0.5 MG: 0.5 SOLUTION RESPIRATORY (INHALATION) at 22:27

## 2017-01-01 RX ADMIN — INSULIN LISPRO 9 UNITS: 100 INJECTION, SOLUTION INTRAVENOUS; SUBCUTANEOUS at 16:27

## 2017-01-01 RX ADMIN — PANTOPRAZOLE SODIUM 40 MG: 40 TABLET, DELAYED RELEASE ORAL at 08:39

## 2017-01-01 RX ADMIN — IPRATROPIUM BROMIDE 0.5 MG: 0.5 SOLUTION RESPIRATORY (INHALATION) at 18:50

## 2017-01-01 RX ADMIN — SODIUM CHLORIDE: 9 INJECTION, SOLUTION INTRAVENOUS at 21:02

## 2017-01-01 RX ADMIN — GUAIFENESIN 1200 MG: 600 TABLET, EXTENDED RELEASE ORAL at 21:14

## 2017-01-01 RX ADMIN — PANTOPRAZOLE SODIUM 40 MG: 40 TABLET, DELAYED RELEASE ORAL at 05:16

## 2017-01-01 RX ADMIN — CEFEPIME 2 G: 2 INJECTION, POWDER, FOR SOLUTION INTRAMUSCULAR; INTRAVENOUS at 09:32

## 2017-01-01 RX ADMIN — LORAZEPAM 1 MG: 2 INJECTION INTRAMUSCULAR; INTRAVENOUS at 17:15

## 2017-01-01 RX ADMIN — CEFEPIME 2 G: 2 INJECTION, POWDER, FOR SOLUTION INTRAMUSCULAR; INTRAVENOUS at 10:30

## 2017-01-01 RX ADMIN — CEFEPIME 2 G: 2 INJECTION, POWDER, FOR SOLUTION INTRAMUSCULAR; INTRAVENOUS at 18:14

## 2017-01-01 RX ADMIN — IPRATROPIUM BROMIDE 0.5 MG: 0.5 SOLUTION RESPIRATORY (INHALATION) at 22:28

## 2017-01-01 RX ADMIN — LEVALBUTEROL HYDROCHLORIDE 1.25 MG: 1.25 SOLUTION RESPIRATORY (INHALATION) at 14:14

## 2017-01-01 RX ADMIN — ENOXAPARIN SODIUM 40 MG: 40 INJECTION SUBCUTANEOUS at 17:46

## 2017-01-01 RX ADMIN — LEVALBUTEROL HYDROCHLORIDE 1.25 MG: 1.25 SOLUTION RESPIRATORY (INHALATION) at 22:42

## 2017-01-01 RX ADMIN — LISINOPRIL 20 MG: 20 TABLET ORAL at 09:35

## 2017-01-01 RX ADMIN — ENOXAPARIN SODIUM 40 MG: 40 INJECTION SUBCUTANEOUS at 18:09

## 2017-01-01 RX ADMIN — IPRATROPIUM BROMIDE 0.5 MG: 0.5 SOLUTION RESPIRATORY (INHALATION) at 19:07

## 2017-01-01 RX ADMIN — METHYLPREDNISOLONE SODIUM SUCCINATE 40 MG: 40 INJECTION, POWDER, FOR SOLUTION INTRAMUSCULAR; INTRAVENOUS at 01:38

## 2017-01-01 ASSESSMENT — ENCOUNTER SYMPTOMS
WHEEZING: 0
EYE ITCHING: 0
SORE THROAT: 0
EYE DISCHARGE: 0
ABDOMINAL PAIN: 0
DIARRHEA: 0
BACK PAIN: 0
COUGH: 1
VOMITING: 0
BLOOD IN STOOL: 0
TROUBLE SWALLOWING: 0
DOUBLE VISION: 0
BACK PAIN: 0
SORE THROAT: 0
VOMITING: 0
BLURRED VISION: 0
ABDOMINAL DISTENTION: 0
STRIDOR: 0
SHORTNESS OF BREATH: 0
VOMITING: 0
SORE THROAT: 0
SINUS PAIN: 0
SHORTNESS OF BREATH: 1
BLURRED VISION: 0
EYE ITCHING: 0
CONSTIPATION: 0
ABDOMINAL PAIN: 0
COUGH: 1
SHORTNESS OF BREATH: 1
SPUTUM PRODUCTION: 1
NAUSEA: 0
ABDOMINAL DISTENTION: 0
TROUBLE SWALLOWING: 0
ABDOMINAL PAIN: 0
EYE DISCHARGE: 0
ABDOMINAL PAIN: 0
VOMITING: 0
DIARRHEA: 0
NAUSEA: 0
EYE PAIN: 0
NAUSEA: 0
NAUSEA: 0
BLOOD IN STOOL: 0
SHORTNESS OF BREATH: 1
SINUS PRESSURE: 0
WHEEZING: 0
HEMOPTYSIS: 0
ORTHOPNEA: 0

## 2017-01-01 ASSESSMENT — PATIENT HEALTH QUESTIONNAIRE - PHQ9
SUM OF ALL RESPONSES TO PHQ QUESTIONS 1-9: 0
2. FEELING DOWN, DEPRESSED OR HOPELESS: 0
SUM OF ALL RESPONSES TO PHQ QUESTIONS 1-9: 0
SUM OF ALL RESPONSES TO PHQ9 QUESTIONS 1 & 2: 0
1. LITTLE INTEREST OR PLEASURE IN DOING THINGS: 0
SUM OF ALL RESPONSES TO PHQ QUESTIONS 1-9: 0

## 2017-01-01 ASSESSMENT — PAIN SCALES - GENERAL
PAINLEVEL_OUTOF10: 8
PAINLEVEL_OUTOF10: 5
PAINLEVEL_OUTOF10: 8

## 2017-01-01 ASSESSMENT — LIFESTYLE VARIABLES: HOW OFTEN DO YOU HAVE A DRINK CONTAINING ALCOHOL: 0

## 2017-01-01 ASSESSMENT — ANXIETY QUESTIONNAIRES: GAD7 TOTAL SCORE: 2

## 2017-01-03 ENCOUNTER — HOSPITAL ENCOUNTER (OUTPATIENT)
Dept: RADIATION ONCOLOGY | Facility: HOSPITAL | Age: 77
Discharge: HOME | End: 2017-01-03

## 2017-01-03 ENCOUNTER — HOSPITAL ENCOUNTER (OUTPATIENT)
Dept: RADIATION ONCOLOGY | Facility: HOSPITAL | Age: 77
Setting detail: RADIATION/ONCOLOGY SERIES
End: 2017-01-03

## 2017-01-03 PROCEDURE — 77386: CPT | Performed by: RADIOLOGY

## 2017-01-04 ENCOUNTER — HOSPITAL ENCOUNTER (OUTPATIENT)
Dept: RADIATION ONCOLOGY | Facility: HOSPITAL | Age: 77
Discharge: HOME | End: 2017-01-04

## 2017-01-04 DIAGNOSIS — Z12.5 ENCOUNTER FOR SCREENING FOR MALIGNANT NEOPLASM OF PROSTATE: ICD-10-CM

## 2017-01-04 DIAGNOSIS — C61 PROSTATE CANCER (HCC): Primary | ICD-10-CM

## 2017-01-04 LAB — PSA SERPL-MCNC: <0.07 NG/ML (ref 0–4)

## 2017-01-04 PROCEDURE — 77386: CPT | Performed by: RADIOLOGY

## 2017-01-05 ENCOUNTER — HOSPITAL ENCOUNTER (OUTPATIENT)
Dept: RADIATION ONCOLOGY | Facility: HOSPITAL | Age: 77
Discharge: HOME | End: 2017-01-05

## 2017-01-05 PROCEDURE — 77386: CPT | Performed by: RADIOLOGY

## 2017-01-05 PROCEDURE — 77336 RADIATION PHYSICS CONSULT: CPT | Performed by: RADIOLOGY

## 2017-01-06 ENCOUNTER — HOSPITAL ENCOUNTER (OUTPATIENT)
Dept: RADIATION ONCOLOGY | Facility: HOSPITAL | Age: 77
Discharge: HOME | End: 2017-01-06

## 2017-01-06 PROCEDURE — 77386: CPT | Performed by: RADIOLOGY

## 2017-01-09 ENCOUNTER — HOSPITAL ENCOUNTER (OUTPATIENT)
Dept: RADIATION ONCOLOGY | Facility: HOSPITAL | Age: 77
Discharge: HOME | End: 2017-01-09

## 2017-01-09 PROCEDURE — 77386: CPT | Performed by: RADIOLOGY

## 2017-01-10 ENCOUNTER — HOSPITAL ENCOUNTER (OUTPATIENT)
Dept: RADIATION ONCOLOGY | Facility: HOSPITAL | Age: 77
Setting detail: RADIATION/ONCOLOGY SERIES
Discharge: HOME | End: 2017-01-10

## 2017-01-10 PROCEDURE — 77386: CPT | Performed by: RADIOLOGY

## 2017-01-11 ENCOUNTER — HOSPITAL ENCOUNTER (OUTPATIENT)
Dept: RADIATION ONCOLOGY | Facility: HOSPITAL | Age: 77
Discharge: HOME | End: 2017-01-11

## 2017-01-11 ENCOUNTER — OFFICE VISIT (OUTPATIENT)
Dept: UROLOGY | Facility: CLINIC | Age: 77
End: 2017-01-11

## 2017-01-11 VITALS
SYSTOLIC BLOOD PRESSURE: 126 MMHG | DIASTOLIC BLOOD PRESSURE: 80 MMHG | BODY MASS INDEX: 22.43 KG/M2 | WEIGHT: 148 LBS | TEMPERATURE: 96.8 F | HEIGHT: 68 IN

## 2017-01-11 DIAGNOSIS — Z12.5 ENCOUNTER FOR SCREENING FOR MALIGNANT NEOPLASM OF PROSTATE: ICD-10-CM

## 2017-01-11 DIAGNOSIS — N39.3 URINARY INCONTINENCE, MALE, STRESS: ICD-10-CM

## 2017-01-11 DIAGNOSIS — N52.9 IMPOTENCE OF ORGANIC ORIGIN: ICD-10-CM

## 2017-01-11 DIAGNOSIS — C61 PROSTATE CANCER (HCC): Primary | ICD-10-CM

## 2017-01-11 LAB
BILIRUB BLD-MCNC: NEGATIVE MG/DL
CLARITY, POC: CLEAR
COLOR UR: YELLOW
GLUCOSE UR STRIP-MCNC: NEGATIVE MG/DL
KETONES UR QL: ABNORMAL
LEUKOCYTE EST, POC: NEGATIVE
NITRITE UR-MCNC: NEGATIVE MG/ML
PH UR: 7 [PH] (ref 5–8)
PROT UR STRIP-MCNC: ABNORMAL MG/DL
RBC # UR STRIP: NEGATIVE /UL
SP GR UR: 1.02 (ref 1–1.03)
UROBILINOGEN UR QL: NORMAL

## 2017-01-11 PROCEDURE — 99213 OFFICE O/P EST LOW 20 MIN: CPT | Performed by: UROLOGY

## 2017-01-11 PROCEDURE — 77336 RADIATION PHYSICS CONSULT: CPT | Performed by: RADIOLOGY

## 2017-01-11 PROCEDURE — 81003 URINALYSIS AUTO W/O SCOPE: CPT | Performed by: UROLOGY

## 2017-01-11 PROCEDURE — 77386: CPT | Performed by: RADIOLOGY

## 2017-01-11 NOTE — LETTER
January 11, 2017     Debbie Whitman MD  [address] [place] [address] [address]  [place] [address]    Patient: Gil Peters   YOB: 1940   Date of Visit: 1/11/2017       Dear Dr. J Carlos MD:    Thank you for referring Gil Peters to me for evaluation. Below are the relevant portions of my assessment and plan of care.    If you have questions, please do not hesitate to call me. I look forward to following Gil along with you.         Sincerely,        Dario Manzano MD        CC: No Recipients  Dario Manzano MD  1/11/2017 11:55 AM  Signed  Subjective    Mr. Peters is 76 y.o. male    Chief Complaint: Prostate Cancer    History of Present Illness     Prostate Cancer  Pt. presents with history of prostate cancer diagnosed in April 2014. Current disease state isdisease in remission Pt. underwent robot assisted laparoscpic prostatectomy  Path showing T3a 7 adenocarcinoma of prostate with Negative surgical margins.  Patient has not  received  XRT completed in April 2014 .He is on nothing for management of prostate cancer.    Pt.  having 1 ppd incontinence.  Associated voiding symptoms include Nocturia .   Pt. Is having erectile dysfunction thatdoes not   to PDE5 inhibitors.  There has been no bone pain, weight loss, abdominal pain, back pain, or gross hematuria.   Most recent PSA <0.070.    Erectile Dysfunction  Patient complains of erectile dysfunction. Onset of dysfunction was several years ago and was gradual in onset.  Patient states the nature of difficulty is attaining erection. Full erections occur never. Partial erections occur never. Libido is not affected. Risk factors for ED include diabetes mellitus and urologic disease, prostate surgery. Patient denies history of beta blocker use.  Previous treatment of ED includes Viagra.    Urinary Incontinence  Patient complains of urinary incontinence. This has been present for 2 years. Symptoms have remained static . Patient uses 1  pads/day.   The patient leaks urine with coughing. Patient describes the symptoms as urine leakage with coughing/heavy physical activity. Factors associated with symptoms include prostate surgery. Evaluation to date includes none. Treatment to date includes Kegel exercises, which was effective.        The following portions of the patient's history were reviewed and updated as appropriate: allergies, current medications, past family history, past medical history, past social history, past surgical history and problem list.    Review of Systems   Constitutional: Negative for chills and fever.   Gastrointestinal: Negative for abdominal pain, anal bleeding and blood in stool.   Genitourinary: Negative for flank pain and hematuria.         Current Outpatient Prescriptions:   •  aspirin 81 MG EC tablet, Take 81 mg by mouth Daily., Disp: , Rfl:   •  lidocaine-Maalox-diphenhydramine (MIRACLE MOUTHWASH) 900 mL suspension, Take 15 mL by mouth 4 (Four) Times a Day Before Meals & at Bedtime As Needed (painful swallowing)., Disp: 300 mL, Rfl: 1  •  lisinopril (PRINIVIL,ZESTRIL) 20 MG tablet, Take 20 mg by mouth Daily., Disp: , Rfl:   •  simvastatin (ZOCOR) 40 MG tablet, Take 40 mg by mouth., Disp: , Rfl:   •  azithromycin (ZITHROMAX) 250 MG tablet, Take 2 tablets the first day, then 1 tablet daily for 4 days., Disp: 6 tablet, Rfl: 0    Past Medical History   Diagnosis Date   • Cataract Extraction    • Hyperlipidemia    • Hypertension    • Lung cancer    • Prostate cancer        Past Surgical History   Procedure Laterality Date   • Prostatectomy  04/22/2014   • Bronchoscopy     • Lung biopsy Left    • Colonoscopy     • Cataract extraction Bilateral        Social History     Social History   • Marital status:      Spouse name: N/A   • Number of children: N/A   • Years of education: N/A     Occupational History   • Retired - [employer]      Social History Main Topics   • Smoking status: Former Smoker     Types: Cigarettes  "  • Smokeless tobacco: Never Used      Comment: QUIT 41 YRS. AGO   • Alcohol use No   • Drug use: No   • Sexual activity: Defer     Other Topics Concern   • None     Social History Narrative       Family History   Problem Relation Age of Onset   • Cancer Mother      ovarian   • Cancer Father      prostate    • Melanoma Child        Objective    Visit Vitals   • /80   • Temp 96.8 °F (36 °C)   • Ht 68\" (172.7 cm)   • Wt 148 lb (67.1 kg)   • BMI 22.5 kg/m2       Physical Exam   Constitutional: He is oriented to person, place, and time. He appears well-developed and well-nourished. No distress.   Pulmonary/Chest: Effort normal.   Abdominal: Soft. He exhibits no distension and no mass. There is no tenderness. There is no rebound and no guarding. No hernia.   Neurological: He is alert and oriented to person, place, and time.   Skin: Skin is warm and dry. He is not diaphoretic.   Psychiatric: He has a normal mood and affect.   Vitals reviewed.          Results for orders placed or performed in visit on 01/11/17   POC Urinalysis Dipstick, Automated   Result Value Ref Range    Color Yellow Yellow, Straw, Dark Yellow, Sofie    Clarity, UA Clear Clear    Glucose, UA Negative Negative, 1000 mg/dL (3+) mg/dL    Bilirubin Negative Negative    Ketones, UA Trace (A) Negative    Specific Gravity  1.020 1.005 - 1.030    Blood, UA Negative Negative    pH, Urine 7.0 5.0 - 8.0    Protein, POC 30 mg/dL (A) Negative mg/dL    Urobilinogen, UA Normal Normal    Leukocytes Negative Negative    Nitrite, UA Negative Negative     Assessment and Plan    Gil was seen today for prostate cancer.    Diagnoses and all orders for this visit:    Prostate cancer  -     POC Urinalysis Dipstick, Automated    Urinary incontinence, male, stress    Impotence of organic origin        Patient is a prostate cancer follow-up.  Pretreatment PSA was 5.6.  He underwent robotic-assisted lap prostatectomy on 4/25/14.  Pathology revealed PT 3A [place] 3+4 " equal 7 adenocarcinoma the prostate with negative surgical margins.  His PSA is undetectable today.  I would have him follow-up in 6 months with a repeat PSA.

## 2017-01-11 NOTE — LETTER
January 11, 2017     Bong Barnett MD  [address] [place] [address] [address]  [place] [address]    Patient: Gil Peters   YOB: 1940   Date of Visit: 1/11/2017       Dear Dr. Anibal MD:    Thank you for referring Gil Peters to me for evaluation. Below are the relevant portions of my assessment and plan of care.    If you have questions, please do not hesitate to call me. I look forward to following Gil along with you.         Sincerely,        Dario Manzano MD        CC: No Recipients  Dario Manzano MD  1/11/2017 11:55 AM  Signed  Subjective    Mr. Peters is 76 y.o. male    Chief Complaint: Prostate Cancer    History of Present Illness     Prostate Cancer  Pt. presents with history of prostate cancer diagnosed in April 2014. Current disease state isdisease in remission Pt. underwent robot assisted laparoscpic prostatectomy  Path showing T3a 7 adenocarcinoma of prostate with Negative surgical margins.  Patient has not  received  XRT completed in April 2014 .He is on nothing for management of prostate cancer.    Pt.  having 1 ppd incontinence.  Associated voiding symptoms include Nocturia .   Pt. Is having erectile dysfunction thatdoes not   to PDE5 inhibitors.  There has been no bone pain, weight loss, abdominal pain, back pain, or gross hematuria.   Most recent PSA <0.070.    Erectile Dysfunction  Patient complains of erectile dysfunction. Onset of dysfunction was several years ago and was gradual in onset.  Patient states the nature of difficulty is attaining erection. Full erections occur never. Partial erections occur never. Libido is not affected. Risk factors for ED include diabetes mellitus and urologic disease, prostate surgery. Patient denies history of beta blocker use.  Previous treatment of ED includes Viagra.    Urinary Incontinence  Patient complains of urinary incontinence. This has been present for 2 years. Symptoms have remained static . Patient uses 1  pads/day.   The patient leaks urine with coughing. Patient describes the symptoms as urine leakage with coughing/heavy physical activity. Factors associated with symptoms include prostate surgery. Evaluation to date includes none. Treatment to date includes Kegel exercises, which was effective.        The following portions of the patient's history were reviewed and updated as appropriate: allergies, current medications, past family history, past medical history, past social history, past surgical history and problem list.    Review of Systems   Constitutional: Negative for chills and fever.   Gastrointestinal: Negative for abdominal pain, anal bleeding and blood in stool.   Genitourinary: Negative for flank pain and hematuria.         Current Outpatient Prescriptions:   •  aspirin 81 MG EC tablet, Take 81 mg by mouth Daily., Disp: , Rfl:   •  lidocaine-Maalox-diphenhydramine (MIRACLE MOUTHWASH) 900 mL suspension, Take 15 mL by mouth 4 (Four) Times a Day Before Meals & at Bedtime As Needed (painful swallowing)., Disp: 300 mL, Rfl: 1  •  lisinopril (PRINIVIL,ZESTRIL) 20 MG tablet, Take 20 mg by mouth Daily., Disp: , Rfl:   •  simvastatin (ZOCOR) 40 MG tablet, Take 40 mg by mouth., Disp: , Rfl:   •  azithromycin (ZITHROMAX) 250 MG tablet, Take 2 tablets the first day, then 1 tablet daily for 4 days., Disp: 6 tablet, Rfl: 0    Past Medical History   Diagnosis Date   • Cataract Extraction    • Hyperlipidemia    • Hypertension    • Lung cancer    • Prostate cancer        Past Surgical History   Procedure Laterality Date   • Prostatectomy  04/22/2014   • Bronchoscopy     • Lung biopsy Left    • Colonoscopy     • Cataract extraction Bilateral        Social History     Social History   • Marital status:      Spouse name: N/A   • Number of children: N/A   • Years of education: N/A     Occupational History   • Retired - [employer]      Social History Main Topics   • Smoking status: Former Smoker     Types: Cigarettes  "  • Smokeless tobacco: Never Used      Comment: QUIT 41 YRS. AGO   • Alcohol use No   • Drug use: No   • Sexual activity: Defer     Other Topics Concern   • None     Social History Narrative       Family History   Problem Relation Age of Onset   • Cancer Mother      ovarian   • Cancer Father      prostate    • Melanoma Child        Objective    Visit Vitals   • /80   • Temp 96.8 °F (36 °C)   • Ht 68\" (172.7 cm)   • Wt 148 lb (67.1 kg)   • BMI 22.5 kg/m2       Physical Exam   Constitutional: He is oriented to person, place, and time. He appears well-developed and well-nourished. No distress.   Pulmonary/Chest: Effort normal.   Abdominal: Soft. He exhibits no distension and no mass. There is no tenderness. There is no rebound and no guarding. No hernia.   Neurological: He is alert and oriented to person, place, and time.   Skin: Skin is warm and dry. He is not diaphoretic.   Psychiatric: He has a normal mood and affect.   Vitals reviewed.          Results for orders placed or performed in visit on 01/11/17   POC Urinalysis Dipstick, Automated   Result Value Ref Range    Color Yellow Yellow, Straw, Dark Yellow, Sofie    Clarity, UA Clear Clear    Glucose, UA Negative Negative, 1000 mg/dL (3+) mg/dL    Bilirubin Negative Negative    Ketones, UA Trace (A) Negative    Specific Gravity  1.020 1.005 - 1.030    Blood, UA Negative Negative    pH, Urine 7.0 5.0 - 8.0    Protein, POC 30 mg/dL (A) Negative mg/dL    Urobilinogen, UA Normal Normal    Leukocytes Negative Negative    Nitrite, UA Negative Negative     Assessment and Plan    Gil was seen today for prostate cancer.    Diagnoses and all orders for this visit:    Prostate cancer  -     POC Urinalysis Dipstick, Automated    Urinary incontinence, male, stress    Impotence of organic origin        Patient is a prostate cancer follow-up.  Pretreatment PSA was 5.6.  He underwent robotic-assisted lap prostatectomy on 4/25/14.  Pathology revealed PT 3A [place] 3+4 " equal 7 adenocarcinoma the prostate with negative surgical margins.  His PSA is undetectable today.  I would have him follow-up in 6 months with a repeat PSA.

## 2017-01-11 NOTE — MR AVS SNAPSHOT
Gil Peters   1/11/2017 8:10 AM   Office Visit    Dept Phone:  [phone]   Encounter #:  [id number]    Provider:  Dario Manzano MD   Department:  [facility] UROLOGY                Your Full Care Plan              Your Updated Medication List          This list is accurate as of: 1/11/17  8:38 AM.  Always use your most recent med list.                aspirin 81 MG EC tablet       azithromycin 250 MG tablet   Commonly known as:  ZITHROMAX   Take 2 tablets the first day, then 1 tablet daily for 4 days.       lidocaine-Maalox-diphenhydramine 900 mL suspension   Commonly known as:  MIRACLE MOUTHWASH   Take 15 mL by mouth 4 (Four) Times a Day Before Meals & at Bedtime As Needed (painful swallowing).       lisinopril 20 MG tablet   Commonly known as:  PRINIVIL,ZESTRIL       simvastatin 40 MG tablet   Commonly known as:  ZOCOR               We Performed the Following     POC Urinalysis Dipstick, Automated       You Were Diagnosed With        Codes Comments    Prostate cancer    -  Primary ICD-10-CM: C61  ICD-9-CM: 185     Urinary incontinence, male, stress     ICD-10-CM: N39.3  ICD-9-CM: 788.32     Impotence of organic origin     ICD-10-CM: N52.9  ICD-9-CM: 607.84     Encounter for screening for malignant neoplasm of prostate     ICD-10-CM: Z12.5  ICD-9-CM: V76.44       Instructions     None    Patient Instructions History      Upcoming Appointments     Visit Type Date Time Department    RAD ONC TREATMENT 1/11/2017  7:30 AM  PAD RAD ONC    FOLLOW UP 1/11/2017  8:10 AM [facility] UROLOGY [facility]    RAD ONC TREATMENT 1/12/2017  7:30 AM  [facility] RAD ONC    RAD ONC TREATMENT 1/13/2017  7:30 AM  [facility] RAD ONC    RAD ONC TREATMENT 1/16/2017  7:30 AM  PAD RAD ONC    RAD ONC TREATMENT 1/17/2017  7:30 AM  [facility] RAD ONC    RAD ONC TREATMENT 1/18/2017  7:30 AM  [facility] RAD ONC    RAD ONC TREATMENT 1/19/2017  7:30 AM  PAD RAD ONC    RAD ONC TREATMENT 1/20/2017  7:30 AM  [facility] RAD ONC    "RAD ONC TREATMENT 1/23/2017  7:30 AM  [facility] RAD ONC    RAD ONC TREATMENT 1/24/2017  7:30 AM  [facility] RAD ONC    RAD ONC TREATMENT 1/25/2017  7:30 AM  PAD RAD ONC      MyChart Signup     Our records indicate that you have declined [facility] MyChart signup. If you would like to sign up for MyChart, please email [email or web address] or call [phone] to obtain an activation code.             Other Info from Your Visit           Your Appointments     Jan 12, 2017  7:30 AM CST   RADIATION ONCOLOGY TREATMENT with  PAD TRUBEAM SRS LINEAR ACCELERATOR   [facility] RAD ONC ([place])    [address] [place] [id number] [phone]            Jan 13, 2017  7:30 AM CST   RADIATION ONCOLOGY TREATMENT with  PAD TRUBEAM SRS LINEAR ACCELERATOR   [facility]    [address] [place] [id number] [phone]            Jan 16, 2017  7:30 AM CST   RADIATION ONCOLOGY TREATMENT with  PAD TRUBEAM SRS LINEAR ACCELERATOR   [facility] RAD ONC ([place])    [address] [place] [id number] [phone]            Jan 17, 2017  7:30 AM CST   RADIATION ONCOLOGY TREATMENT with  PAD TRUBEAM SRS LINEAR ACCELERATOR   [facility]    [address] [place] [id number] [phone]            Jan 18, 2017  7:30 AM CST   RADIATION ONCOLOGY TREATMENT with  PAD TRUBEAM SRS LINEAR ACCELERATOR   [facility] RAD ONC ([place])    [address] [place] [id number] [phone]              Allergies     No Known Allergies      Reason for Visit     Prostate Cancer           Vital Signs     Blood Pressure Temperature Height Weight Body Mass Index Smoking Status    126/80 96.8 °F (36 °C) 68\" (172.7 cm) 148 lb (67.1 kg) 22.5 kg/m2 Former Smoker      Problems and Diagnoses Noted     Prostate cancer    -  Primary    Urinary incontinence, male, stress        Failure of erection        " Screening for prostate cancer          Results     POC Urinalysis Dipstick, Automated      Component Value Standard Range & Units    Color Yellow Yellow, Straw, Dark Yellow, Sofie    Clarity, UA Clear Clear    Glucose, UA Negative Negative, 1000 mg/dL (3+) mg/dL    Bilirubin Negative Negative    Ketones, UA Trace Negative    Specific Gravity  1.020 1.005 - 1.030    Blood, UA Negative Negative    pH, Urine 7.0 5.0 - 8.0    Protein, POC 30 mg/dL Negative mg/dL    Urobilinogen, UA Normal Normal    Leukocytes Negative Negative    Nitrite, UA Negative Negative

## 2017-01-11 NOTE — PROGRESS NOTES
Subjective    Mr. Peters is 76 y.o. male    Chief Complaint: Prostate Cancer    History of Present Illness     Prostate Cancer  Pt. presents with history of prostate cancer diagnosed in April 2014. Current disease state isdisease in remission Pt. underwent robot assisted laparoscpic prostatectomy  Path showing T3a 7 adenocarcinoma of prostate with Negative surgical margins.  Patient has not  received  XRT completed in April 2014 .He is on nothing for management of prostate cancer.    Pt.  having 1 ppd incontinence.  Associated voiding symptoms include Nocturia .   Pt. Is having erectile dysfunction thatdoes not   to PDE5 inhibitors.  There has been no bone pain, weight loss, abdominal pain, back pain, or gross hematuria.   Most recent PSA <0.070.    Erectile Dysfunction  Patient complains of erectile dysfunction. Onset of dysfunction was several years ago and was gradual in onset.  Patient states the nature of difficulty is attaining erection. Full erections occur never. Partial erections occur never. Libido is not affected. Risk factors for ED include diabetes mellitus and urologic disease, prostate surgery. Patient denies history of beta blocker use.  Previous treatment of ED includes Viagra.    Urinary Incontinence  Patient complains of urinary incontinence. This has been present for 2 years. Symptoms have remained static . Patient uses 1 pads/day.   The patient leaks urine with coughing. Patient describes the symptoms as urine leakage with coughing/heavy physical activity. Factors associated with symptoms include prostate surgery. Evaluation to date includes none. Treatment to date includes Kegel exercises, which was effective.        The following portions of the patient's history were reviewed and updated as appropriate: allergies, current medications, past family history, past medical history, past social history, past surgical history and problem list.    Review of Systems   Constitutional: Negative for  "chills and fever.   Gastrointestinal: Negative for abdominal pain, anal bleeding and blood in stool.   Genitourinary: Negative for flank pain and hematuria.         Current Outpatient Prescriptions:   •  aspirin 81 MG EC tablet, Take 81 mg by mouth Daily., Disp: , Rfl:   •  lidocaine-Maalox-diphenhydramine (MIRACLE MOUTHWASH) 900 mL suspension, Take 15 mL by mouth 4 (Four) Times a Day Before Meals & at Bedtime As Needed (painful swallowing)., Disp: 300 mL, Rfl: 1  •  lisinopril (PRINIVIL,ZESTRIL) 20 MG tablet, Take 20 mg by mouth Daily., Disp: , Rfl:   •  simvastatin (ZOCOR) 40 MG tablet, Take 40 mg by mouth., Disp: , Rfl:   •  azithromycin (ZITHROMAX) 250 MG tablet, Take 2 tablets the first day, then 1 tablet daily for 4 days., Disp: 6 tablet, Rfl: 0    Past Medical History   Diagnosis Date   • Cataract Extraction    • Hyperlipidemia    • Hypertension    • Lung cancer    • Prostate cancer        Past Surgical History   Procedure Laterality Date   • Prostatectomy  04/22/2014   • Bronchoscopy     • Lung biopsy Left    • Colonoscopy     • Cataract extraction Bilateral        Social History     Social History   • Marital status:      Spouse name: N/A   • Number of children: N/A   • Years of education: N/A     Occupational History   • Retired - [employer]      Social History Main Topics   • Smoking status: Former Smoker     Types: Cigarettes   • Smokeless tobacco: Never Used      Comment: QUIT 41 YRS. AGO   • Alcohol use No   • Drug use: No   • Sexual activity: Defer     Other Topics Concern   • None     Social History Narrative       Family History   Problem Relation Age of Onset   • Cancer Mother      ovarian   • Cancer Father      prostate    • Melanoma Child        Objective    Visit Vitals   • /80   • Temp 96.8 °F (36 °C)   • Ht 68\" (172.7 cm)   • Wt 148 lb (67.1 kg)   • BMI 22.5 kg/m2       Physical Exam   Constitutional: He is oriented to person, place, and time. He appears well-developed and " well-nourished. No distress.   Pulmonary/Chest: Effort normal.   Abdominal: Soft. He exhibits no distension and no mass. There is no tenderness. There is no rebound and no guarding. No hernia.   Neurological: He is alert and oriented to person, place, and time.   Skin: Skin is warm and dry. He is not diaphoretic.   Psychiatric: He has a normal mood and affect.   Vitals reviewed.          Results for orders placed or performed in visit on 01/11/17   POC Urinalysis Dipstick, Automated   Result Value Ref Range    Color Yellow Yellow, Straw, Dark Yellow, Sofie    Clarity, UA Clear Clear    Glucose, UA Negative Negative, 1000 mg/dL (3+) mg/dL    Bilirubin Negative Negative    Ketones, UA Trace (A) Negative    Specific Gravity  1.020 1.005 - 1.030    Blood, UA Negative Negative    pH, Urine 7.0 5.0 - 8.0    Protein, POC 30 mg/dL (A) Negative mg/dL    Urobilinogen, UA Normal Normal    Leukocytes Negative Negative    Nitrite, UA Negative Negative     Assessment and Plan    Gil was seen today for prostate cancer.    Diagnoses and all orders for this visit:    Prostate cancer  -     POC Urinalysis Dipstick, Automated    Urinary incontinence, male, stress    Impotence of organic origin        Patient is a prostate cancer follow-up.  Pretreatment PSA was 5.6.  He underwent robotic-assisted lap prostatectomy on 4/25/14.  Pathology revealed PT 3A [place] 3+4 equal 7 adenocarcinoma the prostate with negative surgical margins.  His PSA is undetectable today.  I would have him follow-up in 6 months with a repeat PSA.

## 2017-01-12 ENCOUNTER — HOSPITAL ENCOUNTER (OUTPATIENT)
Dept: RADIATION ONCOLOGY | Facility: HOSPITAL | Age: 77
Discharge: HOME | End: 2017-01-12

## 2017-01-12 PROCEDURE — 77386: CPT | Performed by: RADIOLOGY

## 2017-01-13 ENCOUNTER — HOSPITAL ENCOUNTER (OUTPATIENT)
Dept: RADIATION ONCOLOGY | Facility: HOSPITAL | Age: 77
Discharge: HOME | End: 2017-01-13

## 2017-01-13 PROCEDURE — 77386: CPT | Performed by: RADIOLOGY

## 2017-01-17 ENCOUNTER — HOSPITAL ENCOUNTER (OUTPATIENT)
Dept: RADIATION ONCOLOGY | Facility: HOSPITAL | Age: 77
Setting detail: RADIATION/ONCOLOGY SERIES
Discharge: HOME | End: 2017-01-17

## 2017-01-17 PROCEDURE — 77386: CPT | Performed by: RADIOLOGY

## 2017-01-18 PROCEDURE — 77386: CPT | Performed by: RADIOLOGY

## 2017-01-19 ENCOUNTER — HOSPITAL ENCOUNTER (OUTPATIENT)
Dept: RADIATION ONCOLOGY | Facility: HOSPITAL | Age: 77
Setting detail: RADIATION/ONCOLOGY SERIES
Discharge: HOME | End: 2017-01-19

## 2017-01-19 PROCEDURE — 77336 RADIATION PHYSICS CONSULT: CPT | Performed by: RADIOLOGY

## 2017-01-19 PROCEDURE — 77386: CPT | Performed by: RADIOLOGY

## 2017-01-20 ENCOUNTER — HOSPITAL ENCOUNTER (OUTPATIENT)
Dept: RADIATION ONCOLOGY | Facility: HOSPITAL | Age: 77
Discharge: HOME | End: 2017-01-20

## 2017-01-20 PROCEDURE — 77386: CPT | Performed by: RADIOLOGY

## 2017-01-23 ENCOUNTER — HOSPITAL ENCOUNTER (OUTPATIENT)
Dept: RADIATION ONCOLOGY | Facility: HOSPITAL | Age: 77
Discharge: HOME | End: 2017-01-23

## 2017-01-23 PROCEDURE — 77386: CPT | Performed by: RADIOLOGY

## 2017-01-24 ENCOUNTER — HOSPITAL ENCOUNTER (OUTPATIENT)
Dept: RADIATION ONCOLOGY | Facility: HOSPITAL | Age: 77
Setting detail: RADIATION/ONCOLOGY SERIES
Discharge: HOME | End: 2017-01-24

## 2017-01-24 PROCEDURE — 77386: CPT | Performed by: RADIOLOGY

## 2017-01-31 ENCOUNTER — HOSPITAL ENCOUNTER (OUTPATIENT)
Dept: RADIATION ONCOLOGY | Facility: HOSPITAL | Age: 77
Discharge: HOME | End: 2017-01-31

## 2017-01-31 PROCEDURE — 77386: CPT | Performed by: RADIOLOGY

## 2017-02-01 ENCOUNTER — HOSPITAL ENCOUNTER (OUTPATIENT)
Dept: RADIATION ONCOLOGY | Facility: HOSPITAL | Age: 77
Discharge: HOME | End: 2017-02-01

## 2017-02-01 PROCEDURE — 77336 RADIATION PHYSICS CONSULT: CPT | Performed by: RADIOLOGY

## 2017-02-01 PROCEDURE — 77386: CPT | Performed by: RADIOLOGY

## 2017-02-02 ENCOUNTER — TRANSCRIBE ORDERS (OUTPATIENT)
Dept: ADMINISTRATIVE | Facility: HOSPITAL | Age: 77
End: 2017-02-02

## 2017-02-02 DIAGNOSIS — C34.32 MALIGNANT NEOPLASM OF LOWER LOBE BRONCHUS, LEFT (HCC): Primary | ICD-10-CM

## 2017-02-06 ENCOUNTER — HOSPITAL ENCOUNTER (OUTPATIENT)
Dept: RADIATION ONCOLOGY | Facility: HOSPITAL | Age: 77
Setting detail: RADIATION/ONCOLOGY SERIES
End: 2017-02-06

## 2017-02-07 ENCOUNTER — HOSPITAL ENCOUNTER (OUTPATIENT)
Dept: RADIATION ONCOLOGY | Facility: HOSPITAL | Age: 77
Setting detail: RADIATION/ONCOLOGY SERIES
Discharge: HOME | End: 2017-02-07

## 2017-02-07 PROCEDURE — 77386: CPT | Performed by: RADIOLOGY

## 2017-02-08 ENCOUNTER — HOSPITAL ENCOUNTER (OUTPATIENT)
Dept: RADIATION ONCOLOGY | Facility: HOSPITAL | Age: 77
Setting detail: RADIATION/ONCOLOGY SERIES
Discharge: HOME | End: 2017-02-08

## 2017-02-08 PROCEDURE — 77386: CPT | Performed by: RADIOLOGY

## 2017-02-09 ENCOUNTER — HOSPITAL ENCOUNTER (OUTPATIENT)
Dept: RADIATION ONCOLOGY | Facility: HOSPITAL | Age: 77
Setting detail: RADIATION/ONCOLOGY SERIES
Discharge: HOME | End: 2017-02-09

## 2017-02-09 PROCEDURE — 77386: CPT | Performed by: RADIOLOGY

## 2017-02-10 ENCOUNTER — HOSPITAL ENCOUNTER (OUTPATIENT)
Dept: RADIATION ONCOLOGY | Facility: HOSPITAL | Age: 77
Setting detail: RADIATION/ONCOLOGY SERIES
Discharge: HOME | End: 2017-02-10

## 2017-02-10 PROCEDURE — 77386: CPT | Performed by: RADIOLOGY

## 2017-03-13 ENCOUNTER — HOSPITAL ENCOUNTER (OUTPATIENT)
Dept: CT IMAGING | Facility: HOSPITAL | Age: 77
Discharge: HOME | End: 2017-03-13
Attending: INTERNAL MEDICINE | Admitting: INTERNAL MEDICINE

## 2017-03-13 DIAGNOSIS — C34.32 MALIGNANT NEOPLASM OF LOWER LOBE BRONCHUS, LEFT (HCC): ICD-10-CM

## 2017-03-13 LAB — CREAT BLDA-MCNC: 0.7 MG/DL (ref 0.6–1.3)

## 2017-03-13 PROCEDURE — 71260 CT THORAX DX C+: CPT

## 2017-03-13 PROCEDURE — 74177 CT ABD & PELVIS W/CONTRAST: CPT

## 2017-03-13 PROCEDURE — 82565 ASSAY OF CREATININE: CPT

## 2017-03-13 PROCEDURE — 0 IOPAMIDOL 61 % SOLUTION: Performed by: INTERNAL MEDICINE

## 2017-03-13 RX ADMIN — IOPAMIDOL 100 ML: 612 INJECTION, SOLUTION INTRAVENOUS at 10:00

## 2017-03-23 ENCOUNTER — OFFICE VISIT (OUTPATIENT)
Dept: RADIATION ONCOLOGY | Facility: HOSPITAL | Age: 77
End: 2017-03-23

## 2017-03-23 VITALS
BODY MASS INDEX: 20.98 KG/M2 | HEIGHT: 68 IN | DIASTOLIC BLOOD PRESSURE: 66 MMHG | WEIGHT: 138.44 LBS | SYSTOLIC BLOOD PRESSURE: 130 MMHG

## 2017-03-23 DIAGNOSIS — Z92.3 HX OF RADIATION THERAPY: ICD-10-CM

## 2017-03-23 DIAGNOSIS — C34.32 PRIMARY MALIGNANT NEOPLASM OF BRONCHUS OF LEFT LOWER LOBE (HCC): Primary | ICD-10-CM

## 2017-03-23 DIAGNOSIS — Z08 ENCOUNTER FOR FOLLOW-UP EXAMINATION AFTER COMPLETED TREATMENT FOR CANCER: ICD-10-CM

## 2017-05-17 ENCOUNTER — TRANSCRIBE ORDERS (OUTPATIENT)
Dept: ADMINISTRATIVE | Facility: HOSPITAL | Age: 77
End: 2017-05-17

## 2017-05-17 DIAGNOSIS — C34.32 PRIMARY MALIGNANT NEOPLASM OF BRONCHUS OF LEFT LOWER LOBE (HCC): Primary | ICD-10-CM

## 2017-06-01 ENCOUNTER — HOSPITAL ENCOUNTER (OUTPATIENT)
Dept: CT IMAGING | Facility: HOSPITAL | Age: 77
Discharge: HOME | End: 2017-06-01
Attending: INTERNAL MEDICINE | Admitting: INTERNAL MEDICINE

## 2017-06-01 DIAGNOSIS — C34.32 PRIMARY MALIGNANT NEOPLASM OF BRONCHUS OF LEFT LOWER LOBE (HCC): ICD-10-CM

## 2017-06-01 LAB — CREAT BLDA-MCNC: 0.7 MG/DL (ref 0.6–1.3)

## 2017-06-01 PROCEDURE — 71260 CT THORAX DX C+: CPT

## 2017-06-01 PROCEDURE — 82565 ASSAY OF CREATININE: CPT

## 2017-06-01 PROCEDURE — 0 IOPAMIDOL 61 % SOLUTION: Performed by: INTERNAL MEDICINE

## 2017-06-01 PROCEDURE — 74177 CT ABD & PELVIS W/CONTRAST: CPT

## 2017-06-01 RX ADMIN — IOPAMIDOL 100 ML: 612 INJECTION, SOLUTION INTRAVENOUS at 10:15

## 2017-06-13 ENCOUNTER — HOSPITAL ENCOUNTER (OUTPATIENT)
Facility: HOSPITAL | Age: 77
Setting detail: HOSPITAL OUTPATIENT SURGERY
Discharge: HOME | End: 2017-06-13
Attending: INTERNAL MEDICINE | Admitting: INTERNAL MEDICINE

## 2017-06-13 VITALS
WEIGHT: 140 LBS | HEIGHT: 68 IN | OXYGEN SATURATION: 94 % | HEART RATE: 84 BPM | TEMPERATURE: 97.8 F | BODY MASS INDEX: 21.22 KG/M2 | RESPIRATION RATE: 18 BRPM | SYSTOLIC BLOOD PRESSURE: 119 MMHG | DIASTOLIC BLOOD PRESSURE: 59 MMHG

## 2017-06-13 DIAGNOSIS — C34.90 LUNG CANCER (HCC): ICD-10-CM

## 2017-06-13 LAB — KOH PREP NAIL: NORMAL

## 2017-06-13 PROCEDURE — 87206 SMEAR FLUORESCENT/ACID STAI: CPT | Performed by: INTERNAL MEDICINE

## 2017-06-13 PROCEDURE — 88104 CYTOPATH FL NONGYN SMEARS: CPT | Performed by: INTERNAL MEDICINE

## 2017-06-13 PROCEDURE — 87116 MYCOBACTERIA CULTURE: CPT | Performed by: INTERNAL MEDICINE

## 2017-06-13 PROCEDURE — 87070 CULTURE OTHR SPECIMN AEROBIC: CPT | Performed by: INTERNAL MEDICINE

## 2017-06-13 PROCEDURE — 88305 TISSUE EXAM BY PATHOLOGIST: CPT | Performed by: INTERNAL MEDICINE

## 2017-06-13 PROCEDURE — 25010000002 FENTANYL CITRATE (PF) 100 MCG/2ML SOLUTION: Performed by: INTERNAL MEDICINE

## 2017-06-13 PROCEDURE — 87220 TISSUE EXAM FOR FUNGI: CPT | Performed by: INTERNAL MEDICINE

## 2017-06-13 PROCEDURE — 25010000002 MIDAZOLAM PER 1 MG

## 2017-06-13 PROCEDURE — 87077 CULTURE AEROBIC IDENTIFY: CPT | Performed by: INTERNAL MEDICINE

## 2017-06-13 PROCEDURE — 87102 FUNGUS ISOLATION CULTURE: CPT | Performed by: INTERNAL MEDICINE

## 2017-06-13 PROCEDURE — 87205 SMEAR GRAM STAIN: CPT | Performed by: INTERNAL MEDICINE

## 2017-06-13 RX ORDER — LIDOCAINE HYDROCHLORIDE 20 MG/ML
5 INJECTION, SOLUTION EPIDURAL; INFILTRATION; INTRACAUDAL; PERINEURAL ONCE
Status: COMPLETED | OUTPATIENT
Start: 2017-06-13 | End: 2017-06-13

## 2017-06-13 RX ORDER — SODIUM CHLORIDE 9 MG/ML
100 INJECTION, SOLUTION INTRAVENOUS CONTINUOUS
Status: DISCONTINUED | OUTPATIENT
Start: 2017-06-13 | End: 2017-06-13 | Stop reason: HOSPADM

## 2017-06-13 RX ORDER — SODIUM CHLORIDE 0.9 % (FLUSH) 0.9 %
1-10 SYRINGE (ML) INJECTION AS NEEDED
Status: DISCONTINUED | OUTPATIENT
Start: 2017-06-13 | End: 2017-06-13 | Stop reason: HOSPADM

## 2017-06-13 RX ORDER — FENTANYL CITRATE 50 UG/ML
INJECTION, SOLUTION INTRAMUSCULAR; INTRAVENOUS AS NEEDED
Status: COMPLETED | OUTPATIENT
Start: 2017-06-13 | End: 2017-06-13

## 2017-06-13 RX ORDER — MIDAZOLAM HYDROCHLORIDE 5 MG/ML
INJECTION INTRAMUSCULAR; INTRAVENOUS AS NEEDED
Status: COMPLETED | OUTPATIENT
Start: 2017-06-13 | End: 2017-06-13

## 2017-06-13 RX ORDER — LIDOCAINE HYDROCHLORIDE 10 MG/ML
INJECTION, SOLUTION EPIDURAL; INFILTRATION; INTRACAUDAL; PERINEURAL AS NEEDED
Status: DISCONTINUED | OUTPATIENT
Start: 2017-06-13 | End: 2017-06-13 | Stop reason: HOSPADM

## 2017-06-13 RX ORDER — FOLIC ACID 1 MG/1
1 TABLET ORAL DAILY
COMMUNITY

## 2017-06-13 RX ORDER — MEGESTROL ACETATE 40 MG/ML
800 SUSPENSION ORAL DAILY
COMMUNITY

## 2017-06-13 RX ADMIN — FENTANYL CITRATE 25 MCG: 50 INJECTION, SOLUTION INTRAMUSCULAR; INTRAVENOUS at 07:32

## 2017-06-13 RX ADMIN — FENTANYL CITRATE 12.5 MCG: 50 INJECTION, SOLUTION INTRAMUSCULAR; INTRAVENOUS at 07:37

## 2017-06-13 RX ADMIN — LIDOCAINE HYDROCHLORIDE 5 ML: 20 INJECTION, SOLUTION EPIDURAL; INFILTRATION; INTRACAUDAL; PERINEURAL at 06:58

## 2017-06-13 RX ADMIN — MIDAZOLAM HYDROCHLORIDE 1 MG: 5 INJECTION INTRAMUSCULAR; INTRAVENOUS at 07:37

## 2017-06-13 RX ADMIN — SODIUM CHLORIDE 100 ML/HR: 9 INJECTION, SOLUTION INTRAVENOUS at 06:58

## 2017-06-13 RX ADMIN — LIDOCAINE HYDROCHLORIDE 0.5 ML: 10 INJECTION, SOLUTION EPIDURAL; INFILTRATION; INTRACAUDAL; PERINEURAL at 06:58

## 2017-06-13 RX ADMIN — MIDAZOLAM HYDROCHLORIDE 2 MG: 5 INJECTION INTRAMUSCULAR; INTRAVENOUS at 07:33

## 2017-06-13 NOTE — OP NOTE
Bronchoscopy Procedure Note    Date of Operation: 06/13/17  Pre-op Diagnosis: Primary malignant neoplasm of bronchus of left lower lobe  Post-op Diagnosis: Same  Surgeon: Joe Feliciano MD  Mallampati class: 2  ASA class: 3  Anesthesia:Local.  Versed 3 mg, Fentanyl 37.5 mcg.  Aerosolized 4% lidocaine, lidocaine gel to nares.  Topical 2% lidocaine to vocal cords and central airways via bronchoscope.  Operation: Flexible fiberoptic bronchoscopy, diagnostic without c-arm  Bronchoscopy, Diagnostic  Findings: LLL stump intact.  No central airway obstruction or other lesions  Specimen: wash  Estimated Blood Loss: None  Complications: none  Indications and History:  The patient is a 76 y.o.  male with Primary malignant neoplasm of bronchus of left lower lobe.  The risks, benefits, complications, treatment options and expected outcomes were discussed with the patient.  The possibilities of reaction to medication, pulmonary aspiration, perforation of a viscus, bleeding, failure to diagnose a condition and creating a complication requiring transfusion or operation were discussed with the patient who freely signed the consent.  Time out was taken prior to the procedure.  Description of Procedure:  After the induction of topical nasopharyngeal anesthesia, the patient was positioned supine and the bronchoscope was passed through the right naris . The vocal cords were visualized and 2% plain lidocaine was topically placed onto the cords. The cords were normal. The scope was then passed into the trachea. 1% plain lidocaine 10 ml was used topically on the gladis.      The trachea, mainstem bronchi, RUL, RML, Bronchus Intermedius, RLL, FEDERICA, FEDERICA upper division and lingula, and LLL, and all primary segmental airways were examined and were normal except as described below:    Endobronchial findings: LLL stump, no endobronchial lesions  Trachea: normal mucosa  Gladis: Sharp  Right main bronchus: Normal mucosa  Right upper lobe  bronchus: Normal mucosa  Right middle lobe bronchus: Normal mucosa  Right lower lobe bronchus: Normal mucosa  Left main bronchus: Edematous mucosa  Left upper lobe bronchus: Edematous mucosa  Left lower lobe bronchus: stump intact  See photographs for representative findings    The Patient was taken to the Endoscopy Recovery area in satisfactory condition.      Joe Feliciano MD at 7:45 AM, 6/13/2017

## 2017-06-13 NOTE — PLAN OF CARE
Problem: Patient Care Overview (Adult)  Goal: Plan of Care Review  Outcome: Outcome(s) achieved Date Met:  06/13/17 06/13/17 0754   Coping/Psychosocial Response Interventions   Plan Of Care Reviewed With patient;spouse   Patient Care Overview   Progress improving   Outcome Evaluation   Outcome Summary/Follow up Plan D/C CRITERIA MET

## 2017-06-13 NOTE — PLAN OF CARE
Problem: Bronchoscopy (Adult)  Goal: Signs and Symptoms of Listed Potential Problems Will be Absent or Manageable (Bronchoscopy)  Outcome: Outcome(s) achieved Date Met:  06/13/17

## 2017-06-15 LAB
BACTERIA SPEC RESP CULT: NORMAL
CYTO UR: NORMAL
GRAM STN SPEC: NORMAL
GRAM STN SPEC: NORMAL
LAB AP CASE REPORT: NORMAL
LAB AP CLINICAL INFORMATION: NORMAL
Lab: NORMAL
PATH REPORT.FINAL DX SPEC: NORMAL
PATH REPORT.GROSS SPEC: NORMAL

## 2017-06-22 PROBLEM — Z92.3 HISTORY OF THERAPEUTIC RADIATION: Status: ACTIVE | Noted: 2017-01-01

## 2017-06-22 PROBLEM — I10 HTN (HYPERTENSION): Status: ACTIVE | Noted: 2017-01-01

## 2017-06-22 PROBLEM — E78.5 HYPERLIPIDEMIA: Status: ACTIVE | Noted: 2017-01-01

## 2017-06-22 PROBLEM — Z08 ENCOUNTER FOR FOLLOW-UP EXAMINATION AFTER COMPLETED TREATMENT FOR MALIGNANT NEOPLASM: Status: ACTIVE | Noted: 2017-01-01

## 2017-07-10 ENCOUNTER — RESULTS ENCOUNTER (OUTPATIENT)
Dept: UROLOGY | Facility: CLINIC | Age: 77
End: 2017-07-10

## 2017-07-10 DIAGNOSIS — Z12.5 ENCOUNTER FOR SCREENING FOR MALIGNANT NEOPLASM OF PROSTATE: ICD-10-CM

## 2017-07-10 DIAGNOSIS — C61 PROSTATE CANCER (HCC): ICD-10-CM

## 2017-07-25 LAB
FUNGUS WND CULT: NORMAL
MYCOBACTERIUM SPEC CULT: NORMAL
NIGHT BLUE STAIN TISS: NORMAL
NIGHT BLUE STAIN TISS: NORMAL

## 2017-08-01 ENCOUNTER — TRANSCRIBE ORDERS (OUTPATIENT)
Dept: ADMINISTRATIVE | Facility: HOSPITAL | Age: 77
End: 2017-08-01

## 2017-08-01 DIAGNOSIS — C34.32 PRIMARY MALIGNANT NEOPLASM OF BRONCHUS OF LEFT LOWER LOBE (HCC): Primary | ICD-10-CM

## 2017-08-15 PROBLEM — A41.9 SEPSIS DUE TO UNDETERMINED ORGANISM (HCC): Status: ACTIVE | Noted: 2017-01-01

## 2017-08-15 PROBLEM — E87.20 LACTIC ACIDOSIS: Status: ACTIVE | Noted: 2017-01-01

## 2017-08-15 PROBLEM — J18.9 COMMUNITY ACQUIRED PNEUMONIA: Status: ACTIVE | Noted: 2017-01-01

## 2017-08-16 PROBLEM — J18.9 PNEUMONIA OF BOTH LUNGS DUE TO INFECTIOUS ORGANISM: Status: ACTIVE | Noted: 2017-01-01

## 2017-08-16 PROBLEM — J96.01 ACUTE RESPIRATORY FAILURE WITH HYPOXIA (HCC): Status: ACTIVE | Noted: 2017-01-01

## 2017-08-19 PROBLEM — J81.0 PULMONARY EDEMA, ACUTE (HCC): Status: ACTIVE | Noted: 2017-01-01

## 2017-08-19 PROBLEM — E87.70 FLUID OVERLOAD: Status: ACTIVE | Noted: 2017-01-01

## 2017-08-21 PROBLEM — I27.20 PULMONARY HYPERTENSION (HCC): Status: ACTIVE | Noted: 2017-01-01

## 2017-08-21 PROBLEM — T50.905A HYPERGLYCEMIA, DRUG-INDUCED: Status: ACTIVE | Noted: 2017-01-01

## 2017-08-21 PROBLEM — R73.9 HYPERGLYCEMIA, DRUG-INDUCED: Status: ACTIVE | Noted: 2017-01-01

## 2017-08-23 ENCOUNTER — APPOINTMENT (OUTPATIENT)
Dept: GENERAL RADIOLOGY | Facility: HOSPITAL | Age: 77
End: 2017-08-23

## 2017-08-23 ENCOUNTER — HOSPITAL ENCOUNTER (OUTPATIENT)
Facility: HOSPITAL | Age: 77
Discharge: NURSING, REHAB OR LONG-TERM CARE | End: 2017-09-11
Attending: INTERNAL MEDICINE | Admitting: INTERNAL MEDICINE

## 2017-08-23 DIAGNOSIS — Z78.9 DECREASED ACTIVITIES OF DAILY LIVING (ADL): ICD-10-CM

## 2017-08-23 DIAGNOSIS — Z74.09 IMPAIRED MOBILITY: ICD-10-CM

## 2017-08-23 PROCEDURE — 71010 HC CHEST PA OR AP: CPT

## 2017-08-23 RX ORDER — ATORVASTATIN CALCIUM 10 MG/1
20 TABLET, FILM COATED ORAL NIGHTLY
Status: DISCONTINUED | OUTPATIENT
Start: 2017-08-24 | End: 2017-09-05

## 2017-08-23 RX ORDER — FUROSEMIDE 40 MG/1
40 TABLET ORAL DAILY
Status: DISPENSED | OUTPATIENT
Start: 2017-08-24 | End: 2017-08-27

## 2017-08-23 RX ORDER — BUDESONIDE AND FORMOTEROL FUMARATE DIHYDRATE 80; 4.5 UG/1; UG/1
2 AEROSOL RESPIRATORY (INHALATION)
Status: DISCONTINUED | OUTPATIENT
Start: 2017-08-24 | End: 2017-09-11 | Stop reason: HOSPADM

## 2017-08-23 RX ORDER — METHYLPREDNISOLONE SODIUM SUCCINATE 40 MG/ML
40 INJECTION, POWDER, LYOPHILIZED, FOR SOLUTION INTRAMUSCULAR; INTRAVENOUS EVERY 8 HOURS
Status: DISPENSED | OUTPATIENT
Start: 2017-08-24 | End: 2017-08-26

## 2017-08-23 RX ORDER — FOLIC ACID 1 MG/1
1 TABLET ORAL DAILY
Status: DISCONTINUED | OUTPATIENT
Start: 2017-08-24 | End: 2017-09-11 | Stop reason: HOSPADM

## 2017-08-23 RX ORDER — GUAIFENESIN 600 MG/1
1200 TABLET, EXTENDED RELEASE ORAL EVERY 12 HOURS SCHEDULED
Status: DISPENSED | OUTPATIENT
Start: 2017-08-24 | End: 2017-08-29

## 2017-08-23 RX ORDER — MEGESTROL ACETATE 40 MG/ML
40 SUSPENSION ORAL DAILY
Status: DISCONTINUED | OUTPATIENT
Start: 2017-08-24 | End: 2017-09-11 | Stop reason: HOSPADM

## 2017-08-23 RX ORDER — LEVOFLOXACIN 500 MG/1
500 TABLET, FILM COATED ORAL EVERY 24 HOURS
Status: DISPENSED | OUTPATIENT
Start: 2017-08-24 | End: 2017-08-29

## 2017-08-23 RX ORDER — LEVALBUTEROL INHALATION SOLUTION 1.25 MG/3ML
1.25 SOLUTION RESPIRATORY (INHALATION)
Status: DISCONTINUED | OUTPATIENT
Start: 2017-08-24 | End: 2017-08-25 | Stop reason: SDUPTHER

## 2017-08-23 RX ORDER — PREDNISONE 10 MG/1
10 TABLET ORAL
Status: DISCONTINUED | OUTPATIENT
Start: 2017-08-24 | End: 2017-08-27 | Stop reason: ALTCHOICE

## 2017-08-24 LAB
ALBUMIN SERPL-MCNC: 3.1 G/DL (ref 3.5–5)
ALBUMIN/GLOB SERPL: 1.2 G/DL (ref 1.1–2.5)
ALP SERPL-CCNC: 66 U/L (ref 24–120)
ALT SERPL W P-5'-P-CCNC: 65 U/L (ref 0–54)
ANION GAP SERPL CALCULATED.3IONS-SCNC: 7 MMOL/L (ref 4–13)
AST SERPL-CCNC: 44 U/L (ref 7–45)
BILIRUB SERPL-MCNC: 0.6 MG/DL (ref 0.1–1)
BUN BLD-MCNC: 44 MG/DL (ref 5–21)
BUN/CREAT SERPL: 57.9 (ref 7–25)
CALCIUM SPEC-SCNC: 8.7 MG/DL (ref 8.4–10.4)
CHLORIDE SERPL-SCNC: 95 MMOL/L (ref 98–110)
CO2 SERPL-SCNC: 32 MMOL/L (ref 24–31)
CREAT BLD-MCNC: 0.76 MG/DL (ref 0.5–1.4)
DEPRECATED RDW RBC AUTO: 53.6 FL (ref 40–54)
ERYTHROCYTE [DISTWIDTH] IN BLOOD BY AUTOMATED COUNT: 16.3 % (ref 12–15)
GFR SERPL CREATININE-BSD FRML MDRD: 100 ML/MIN/1.73
GLOBULIN UR ELPH-MCNC: 2.6 GM/DL
GLUCOSE BLD-MCNC: 50 MG/DL (ref 70–100)
GLUCOSE BLDC GLUCOMTR-MCNC: 105 MG/DL (ref 70–130)
GLUCOSE BLDC GLUCOMTR-MCNC: 222 MG/DL (ref 70–130)
GLUCOSE BLDC GLUCOMTR-MCNC: 61 MG/DL (ref 70–130)
HCT VFR BLD AUTO: 39.6 % (ref 40–52)
HGB BLD-MCNC: 12.9 G/DL (ref 14–18)
LYMPHOCYTES # BLD MANUAL: 0.53 10*3/MM3 (ref 0.72–4.86)
LYMPHOCYTES NFR BLD MANUAL: 2 % (ref 15–45)
LYMPHOCYTES NFR BLD MANUAL: 3 % (ref 4–12)
MCH RBC QN AUTO: 29.4 PG (ref 28–32)
MCHC RBC AUTO-ENTMCNC: 32.6 G/DL (ref 33–36)
MCV RBC AUTO: 90.2 FL (ref 82–95)
METAMYELOCYTES NFR BLD MANUAL: 5 % (ref 0–0)
MONOCYTES # BLD AUTO: 0.79 10*3/MM3 (ref 0.19–1.3)
NEUTROPHILS # BLD AUTO: 22.64 10*3/MM3 (ref 1.87–8.4)
NEUTROPHILS NFR BLD MANUAL: 82 % (ref 39–78)
NEUTS BAND NFR BLD MANUAL: 4 % (ref 0–10)
PLAT MORPH BLD: NORMAL
PLATELET # BLD AUTO: 319 10*3/MM3 (ref 130–400)
PMV BLD AUTO: 9.6 FL (ref 6–12)
POTASSIUM BLD-SCNC: 4.2 MMOL/L (ref 3.5–5.3)
PREALB SERPL-MCNC: 30.5 MG/DL (ref 18–36)
PROT SERPL-MCNC: 5.7 G/DL (ref 6.3–8.7)
RBC # BLD AUTO: 4.39 10*6/MM3 (ref 4.8–5.9)
RBC MORPH BLD: NORMAL
SCAN SLIDE: NORMAL
SODIUM BLD-SCNC: 134 MMOL/L (ref 135–145)
VARIANT LYMPHS NFR BLD MANUAL: 4 % (ref 0–5)
WBC MORPH BLD: NORMAL
WBC NRBC COR # BLD: 26.33 10*3/MM3 (ref 4.8–10.8)

## 2017-08-24 PROCEDURE — 80053 COMPREHEN METABOLIC PANEL: CPT | Performed by: INTERNAL MEDICINE

## 2017-08-24 PROCEDURE — 97110 THERAPEUTIC EXERCISES: CPT

## 2017-08-24 PROCEDURE — 85007 BL SMEAR W/DIFF WBC COUNT: CPT | Performed by: INTERNAL MEDICINE

## 2017-08-24 PROCEDURE — 82962 GLUCOSE BLOOD TEST: CPT

## 2017-08-24 PROCEDURE — 97162 PT EVAL MOD COMPLEX 30 MIN: CPT

## 2017-08-24 PROCEDURE — 97530 THERAPEUTIC ACTIVITIES: CPT

## 2017-08-24 PROCEDURE — 25010000002 CEFEPIME: Performed by: INTERNAL MEDICINE

## 2017-08-24 PROCEDURE — 84134 ASSAY OF PREALBUMIN: CPT | Performed by: INTERNAL MEDICINE

## 2017-08-24 PROCEDURE — 25010000002 LEVALBUTEROL PER 0.5 MG: Performed by: INTERNAL MEDICINE

## 2017-08-24 PROCEDURE — 85025 COMPLETE CBC W/AUTO DIFF WBC: CPT | Performed by: INTERNAL MEDICINE

## 2017-08-24 PROCEDURE — 97167 OT EVAL HIGH COMPLEX 60 MIN: CPT

## 2017-08-24 PROCEDURE — 63710000001 PREDNISONE PER 5 MG: Performed by: INTERNAL MEDICINE

## 2017-08-24 PROCEDURE — 25010000002 METHYLPREDNISOLONE PER 40 MG: Performed by: INTERNAL MEDICINE

## 2017-08-24 PROCEDURE — 63710000001 INSULIN DETEMIR PER 5 UNITS: Performed by: NURSE PRACTITIONER

## 2017-08-25 LAB
GLUCOSE BLDC GLUCOMTR-MCNC: 187 MG/DL (ref 70–130)
GLUCOSE BLDC GLUCOMTR-MCNC: 255 MG/DL (ref 70–130)

## 2017-08-25 PROCEDURE — 63710000001 INSULIN DETEMIR PER 5 UNITS: Performed by: NURSE PRACTITIONER

## 2017-08-25 PROCEDURE — 97535 SELF CARE MNGMENT TRAINING: CPT

## 2017-08-25 PROCEDURE — 82962 GLUCOSE BLOOD TEST: CPT

## 2017-08-25 PROCEDURE — 25010000002 METHYLPREDNISOLONE PER 40 MG: Performed by: INTERNAL MEDICINE

## 2017-08-25 PROCEDURE — 25010000002 CEFEPIME: Performed by: INTERNAL MEDICINE

## 2017-08-25 PROCEDURE — 97530 THERAPEUTIC ACTIVITIES: CPT

## 2017-08-25 PROCEDURE — 63710000001 PREDNISONE PER 5 MG: Performed by: INTERNAL MEDICINE

## 2017-08-25 PROCEDURE — 97110 THERAPEUTIC EXERCISES: CPT

## 2017-08-25 PROCEDURE — 25010000002 LEVALBUTEROL PER 0.5 MG: Performed by: INTERNAL MEDICINE

## 2017-08-25 RX ORDER — LEVALBUTEROL 1.25 MG/.5ML
1.25 SOLUTION, CONCENTRATE RESPIRATORY (INHALATION)
Status: DISCONTINUED | OUTPATIENT
Start: 2017-08-25 | End: 2017-09-07

## 2017-08-26 LAB
GLUCOSE BLDC GLUCOMTR-MCNC: 296 MG/DL (ref 70–130)
GLUCOSE BLDC GLUCOMTR-MCNC: 417 MG/DL (ref 70–130)

## 2017-08-26 PROCEDURE — 25010000002 CEFEPIME: Performed by: INTERNAL MEDICINE

## 2017-08-26 PROCEDURE — 25010000002 METHYLPREDNISOLONE PER 40 MG: Performed by: INTERNAL MEDICINE

## 2017-08-26 PROCEDURE — 63710000001 INSULIN DETEMIR PER 5 UNITS: Performed by: NURSE PRACTITIONER

## 2017-08-26 PROCEDURE — 63710000001 PREDNISONE PER 5 MG: Performed by: INTERNAL MEDICINE

## 2017-08-26 PROCEDURE — 82962 GLUCOSE BLOOD TEST: CPT

## 2017-08-26 PROCEDURE — 97116 GAIT TRAINING THERAPY: CPT

## 2017-08-27 LAB
GLUCOSE BLDC GLUCOMTR-MCNC: 185 MG/DL (ref 70–130)
GLUCOSE BLDC GLUCOMTR-MCNC: 314 MG/DL (ref 70–130)

## 2017-08-27 PROCEDURE — 82962 GLUCOSE BLOOD TEST: CPT

## 2017-08-27 PROCEDURE — 63710000001 INSULIN DETEMIR PER 5 UNITS: Performed by: NURSE PRACTITIONER

## 2017-08-27 PROCEDURE — 97116 GAIT TRAINING THERAPY: CPT

## 2017-08-27 PROCEDURE — 63710000001 INSULIN LISPRO (HUMAN) PER 5 UNITS: Performed by: INTERNAL MEDICINE

## 2017-08-27 PROCEDURE — 25010000002 CEFEPIME: Performed by: INTERNAL MEDICINE

## 2017-08-27 PROCEDURE — 25010000002 FUROSEMIDE PER 20 MG: Performed by: INTERNAL MEDICINE

## 2017-08-27 PROCEDURE — 97535 SELF CARE MNGMENT TRAINING: CPT

## 2017-08-27 PROCEDURE — 97110 THERAPEUTIC EXERCISES: CPT

## 2017-08-27 RX ORDER — DEXTROSE MONOHYDRATE 25 G/50ML
25 INJECTION, SOLUTION INTRAVENOUS
Status: DISCONTINUED | OUTPATIENT
Start: 2017-08-27 | End: 2017-09-11 | Stop reason: HOSPADM

## 2017-08-27 RX ORDER — NICOTINE POLACRILEX 4 MG
15 LOZENGE BUCCAL
Status: DISCONTINUED | OUTPATIENT
Start: 2017-08-27 | End: 2017-09-11 | Stop reason: HOSPADM

## 2017-08-27 RX ORDER — FUROSEMIDE 10 MG/ML
20 INJECTION INTRAMUSCULAR; INTRAVENOUS ONCE
Status: DISCONTINUED | OUTPATIENT
Start: 2017-08-27 | End: 2017-08-28

## 2017-08-27 RX ORDER — POLYETHYLENE GLYCOL 3350 17 G/17G
17 POWDER, FOR SOLUTION ORAL EVERY 24 HOURS
Status: DISCONTINUED | OUTPATIENT
Start: 2017-08-27 | End: 2017-09-11 | Stop reason: HOSPADM

## 2017-08-27 RX ORDER — DOCUSATE SODIUM 100 MG/1
100 CAPSULE, LIQUID FILLED ORAL 2 TIMES DAILY
Status: DISCONTINUED | OUTPATIENT
Start: 2017-08-27 | End: 2017-09-11 | Stop reason: HOSPADM

## 2017-08-28 LAB
ANION GAP SERPL CALCULATED.3IONS-SCNC: 6 MMOL/L (ref 4–13)
BUN BLD-MCNC: 32 MG/DL (ref 5–21)
BUN/CREAT SERPL: 49.2 (ref 7–25)
CALCIUM SPEC-SCNC: 8.5 MG/DL (ref 8.4–10.4)
CHLORIDE SERPL-SCNC: 96 MMOL/L (ref 98–110)
CO2 SERPL-SCNC: 32 MMOL/L (ref 24–31)
CREAT BLD-MCNC: 0.65 MG/DL (ref 0.5–1.4)
DEPRECATED RDW RBC AUTO: 53.5 FL (ref 40–54)
EOSINOPHIL # BLD MANUAL: 0.35 10*3/MM3 (ref 0–0.7)
EOSINOPHIL NFR BLD MANUAL: 2 % (ref 0–4)
ERYTHROCYTE [DISTWIDTH] IN BLOOD BY AUTOMATED COUNT: 16.2 % (ref 12–15)
GFR SERPL CREATININE-BSD FRML MDRD: 119 ML/MIN/1.73
GLUCOSE BLD-MCNC: 63 MG/DL (ref 70–100)
GLUCOSE BLDC GLUCOMTR-MCNC: 109 MG/DL (ref 70–130)
GLUCOSE BLDC GLUCOMTR-MCNC: 128 MG/DL (ref 70–130)
GLUCOSE BLDC GLUCOMTR-MCNC: 134 MG/DL (ref 70–130)
GLUCOSE BLDC GLUCOMTR-MCNC: 212 MG/DL (ref 70–130)
GLUCOSE BLDC GLUCOMTR-MCNC: 235 MG/DL (ref 70–130)
GLUCOSE BLDC GLUCOMTR-MCNC: 44 MG/DL (ref 70–130)
GLUCOSE BLDC GLUCOMTR-MCNC: 46 MG/DL (ref 70–130)
HCT VFR BLD AUTO: 39.3 % (ref 40–52)
HGB BLD-MCNC: 12.6 G/DL (ref 14–18)
LYMPHOCYTES # BLD MANUAL: 0.53 10*3/MM3 (ref 0.72–4.86)
LYMPHOCYTES NFR BLD MANUAL: 3 % (ref 15–45)
LYMPHOCYTES NFR BLD MANUAL: 5 % (ref 4–12)
MCH RBC QN AUTO: 29 PG (ref 28–32)
MCHC RBC AUTO-ENTMCNC: 32.1 G/DL (ref 33–36)
MCV RBC AUTO: 90.3 FL (ref 82–95)
METAMYELOCYTES NFR BLD MANUAL: 1 % (ref 0–0)
MONOCYTES # BLD AUTO: 0.88 10*3/MM3 (ref 0.19–1.3)
MYELOCYTES NFR BLD MANUAL: 1 % (ref 0–0)
NEUTROPHILS # BLD AUTO: 14.58 10*3/MM3 (ref 1.87–8.4)
NEUTROPHILS NFR BLD MANUAL: 80 % (ref 39–78)
NEUTS BAND NFR BLD MANUAL: 3 % (ref 0–10)
PLAT MORPH BLD: NORMAL
PLATELET # BLD AUTO: 267 10*3/MM3 (ref 130–400)
PMV BLD AUTO: 9.4 FL (ref 6–12)
POTASSIUM BLD-SCNC: 3.7 MMOL/L (ref 3.5–5.3)
PROMYELOCYTES NFR BLD MANUAL: 2 % (ref 0–0)
RBC # BLD AUTO: 4.35 10*6/MM3 (ref 4.8–5.9)
RBC MORPH BLD: NORMAL
SCAN SLIDE: NORMAL
SODIUM BLD-SCNC: 134 MMOL/L (ref 135–145)
VARIANT LYMPHS NFR BLD MANUAL: 3 % (ref 0–5)
WBC MORPH BLD: NORMAL
WBC NRBC COR # BLD: 17.57 10*3/MM3 (ref 4.8–10.8)

## 2017-08-28 PROCEDURE — 97116 GAIT TRAINING THERAPY: CPT

## 2017-08-28 PROCEDURE — 85025 COMPLETE CBC W/AUTO DIFF WBC: CPT | Performed by: NURSE PRACTITIONER

## 2017-08-28 PROCEDURE — 97110 THERAPEUTIC EXERCISES: CPT

## 2017-08-28 PROCEDURE — 25010000002 CEFEPIME: Performed by: INTERNAL MEDICINE

## 2017-08-28 PROCEDURE — 80048 BASIC METABOLIC PNL TOTAL CA: CPT | Performed by: NURSE PRACTITIONER

## 2017-08-28 PROCEDURE — 63710000001 INSULIN LISPRO (HUMAN) PER 5 UNITS: Performed by: INTERNAL MEDICINE

## 2017-08-28 PROCEDURE — 97535 SELF CARE MNGMENT TRAINING: CPT

## 2017-08-28 PROCEDURE — 85007 BL SMEAR W/DIFF WBC COUNT: CPT | Performed by: NURSE PRACTITIONER

## 2017-08-28 PROCEDURE — 82962 GLUCOSE BLOOD TEST: CPT

## 2017-08-29 LAB
GLUCOSE BLDC GLUCOMTR-MCNC: 142 MG/DL (ref 70–130)
GLUCOSE BLDC GLUCOMTR-MCNC: 161 MG/DL (ref 70–130)
GLUCOSE BLDC GLUCOMTR-MCNC: 211 MG/DL (ref 70–130)

## 2017-08-29 PROCEDURE — 63710000001 INSULIN LISPRO (HUMAN) PER 5 UNITS: Performed by: INTERNAL MEDICINE

## 2017-08-29 PROCEDURE — 97116 GAIT TRAINING THERAPY: CPT

## 2017-08-29 PROCEDURE — 97530 THERAPEUTIC ACTIVITIES: CPT

## 2017-08-29 PROCEDURE — 97535 SELF CARE MNGMENT TRAINING: CPT

## 2017-08-29 PROCEDURE — 82962 GLUCOSE BLOOD TEST: CPT

## 2017-08-30 ENCOUNTER — APPOINTMENT (OUTPATIENT)
Dept: GENERAL RADIOLOGY | Facility: HOSPITAL | Age: 77
End: 2017-08-30

## 2017-08-30 LAB
GLUCOSE BLDC GLUCOMTR-MCNC: 107 MG/DL (ref 70–130)
GLUCOSE BLDC GLUCOMTR-MCNC: 128 MG/DL (ref 70–130)
GLUCOSE BLDC GLUCOMTR-MCNC: 133 MG/DL (ref 70–130)

## 2017-08-30 PROCEDURE — 82962 GLUCOSE BLOOD TEST: CPT

## 2017-08-30 PROCEDURE — 97116 GAIT TRAINING THERAPY: CPT

## 2017-08-30 PROCEDURE — 97110 THERAPEUTIC EXERCISES: CPT

## 2017-08-30 PROCEDURE — 71020 HC CHEST PA AND LATERAL: CPT

## 2017-08-30 PROCEDURE — 97535 SELF CARE MNGMENT TRAINING: CPT

## 2017-08-30 PROCEDURE — 63710000001 PREDNISONE PER 1 MG: Performed by: INTERNAL MEDICINE

## 2017-08-30 RX ORDER — PREDNISONE 20 MG/1
40 TABLET ORAL ONCE
Status: DISCONTINUED | OUTPATIENT
Start: 2017-08-30 | End: 2017-09-11 | Stop reason: HOSPADM

## 2017-08-30 RX ORDER — PREDNISONE 20 MG/1
20 TABLET ORAL
Status: DISCONTINUED | OUTPATIENT
Start: 2017-08-31 | End: 2017-09-04

## 2017-08-31 LAB
ANION GAP SERPL CALCULATED.3IONS-SCNC: 6 MMOL/L (ref 4–13)
BUN BLD-MCNC: 19 MG/DL (ref 5–21)
BUN/CREAT SERPL: 33.9 (ref 7–25)
CALCIUM SPEC-SCNC: 8.4 MG/DL (ref 8.4–10.4)
CHLORIDE SERPL-SCNC: 99 MMOL/L (ref 98–110)
CO2 SERPL-SCNC: 28 MMOL/L (ref 24–31)
CREAT BLD-MCNC: 0.56 MG/DL (ref 0.5–1.4)
DEPRECATED RDW RBC AUTO: 54.7 FL (ref 40–54)
ERYTHROCYTE [DISTWIDTH] IN BLOOD BY AUTOMATED COUNT: 16.6 % (ref 12–15)
GFR SERPL CREATININE-BSD FRML MDRD: 141 ML/MIN/1.73
GLUCOSE BLD-MCNC: 135 MG/DL (ref 70–100)
GLUCOSE BLDC GLUCOMTR-MCNC: 135 MG/DL (ref 70–130)
GLUCOSE BLDC GLUCOMTR-MCNC: 178 MG/DL (ref 70–130)
GLUCOSE BLDC GLUCOMTR-MCNC: 227 MG/DL (ref 70–130)
GLUCOSE BLDC GLUCOMTR-MCNC: 240 MG/DL (ref 70–130)
GLUCOSE BLDC GLUCOMTR-MCNC: 320 MG/DL (ref 70–130)
HCT VFR BLD AUTO: 36.6 % (ref 40–52)
HGB BLD-MCNC: 11.6 G/DL (ref 14–18)
LYMPHOCYTES # BLD MANUAL: 0.28 10*3/MM3 (ref 0.72–4.86)
LYMPHOCYTES NFR BLD MANUAL: 2 % (ref 15–45)
LYMPHOCYTES NFR BLD MANUAL: 3 % (ref 4–12)
MCH RBC QN AUTO: 28.4 PG (ref 28–32)
MCHC RBC AUTO-ENTMCNC: 31.7 G/DL (ref 33–36)
MCV RBC AUTO: 89.7 FL (ref 82–95)
MONOCYTES # BLD AUTO: 0.42 10*3/MM3 (ref 0.19–1.3)
NEUTROPHILS # BLD AUTO: 13.18 10*3/MM3 (ref 1.87–8.4)
NEUTROPHILS NFR BLD MANUAL: 95 % (ref 39–78)
PLAT MORPH BLD: NORMAL
PLATELET # BLD AUTO: 251 10*3/MM3 (ref 130–400)
PMV BLD AUTO: 9.2 FL (ref 6–12)
POTASSIUM BLD-SCNC: 4.6 MMOL/L (ref 3.5–5.3)
RBC # BLD AUTO: 4.08 10*6/MM3 (ref 4.8–5.9)
RBC MORPH BLD: NORMAL
SCAN SLIDE: NORMAL
SODIUM BLD-SCNC: 133 MMOL/L (ref 135–145)
WBC MORPH BLD: NORMAL
WBC NRBC COR # BLD: 13.87 10*3/MM3 (ref 4.8–10.8)

## 2017-08-31 PROCEDURE — 97110 THERAPEUTIC EXERCISES: CPT

## 2017-08-31 PROCEDURE — 97535 SELF CARE MNGMENT TRAINING: CPT

## 2017-08-31 PROCEDURE — 85007 BL SMEAR W/DIFF WBC COUNT: CPT | Performed by: NURSE PRACTITIONER

## 2017-08-31 PROCEDURE — 63710000001 PREDNISONE PER 1 MG: Performed by: INTERNAL MEDICINE

## 2017-08-31 PROCEDURE — 82962 GLUCOSE BLOOD TEST: CPT

## 2017-08-31 PROCEDURE — 97116 GAIT TRAINING THERAPY: CPT

## 2017-08-31 PROCEDURE — 63710000001 INSULIN LISPRO (HUMAN) PER 5 UNITS: Performed by: INTERNAL MEDICINE

## 2017-08-31 PROCEDURE — 85025 COMPLETE CBC W/AUTO DIFF WBC: CPT | Performed by: NURSE PRACTITIONER

## 2017-08-31 PROCEDURE — 80048 BASIC METABOLIC PNL TOTAL CA: CPT | Performed by: NURSE PRACTITIONER

## 2017-09-01 LAB
GLUCOSE BLDC GLUCOMTR-MCNC: 148 MG/DL (ref 70–130)
GLUCOSE BLDC GLUCOMTR-MCNC: 196 MG/DL (ref 70–130)
GLUCOSE BLDC GLUCOMTR-MCNC: 207 MG/DL (ref 70–130)
GLUCOSE BLDC GLUCOMTR-MCNC: 252 MG/DL (ref 70–130)

## 2017-09-01 PROCEDURE — 63710000001 INSULIN LISPRO (HUMAN) PER 5 UNITS: Performed by: INTERNAL MEDICINE

## 2017-09-01 PROCEDURE — 97116 GAIT TRAINING THERAPY: CPT

## 2017-09-01 PROCEDURE — 82962 GLUCOSE BLOOD TEST: CPT

## 2017-09-01 PROCEDURE — 63710000001 PREDNISONE PER 1 MG: Performed by: INTERNAL MEDICINE

## 2017-09-01 PROCEDURE — 63710000001 INSULIN LISPRO (HUMAN) PER 5 UNITS: Performed by: NURSE PRACTITIONER

## 2017-09-01 PROCEDURE — 97110 THERAPEUTIC EXERCISES: CPT

## 2017-09-01 NOTE — PROGRESS NOTES
"Adult Nutrition  Assessment/PES    Patient Name:  Gil Peters  YOB: 1940  MRN: [id number]  Admit Date:  8/23/2017    Assessment Date:  9/1/2017        Reason for Assessment       09/01/17 1058    Reason for Assessment    Reason For Assessment/Visit follow up protocol    Diagnosis Diagnosis    Cardiac HTN    Endocrine DM Type 2;Hypoglycemia    Oncology Lung cancer;Prostate cancer    Pulmonary/Critical Care Acute respiratory failure    Other diagnosis Weakness / FTT              Nutrition/Diet History       09/01/17 1118    Nutrition/Diet History    Meal/Snack Patterns 3 meals per day     Reported/Observed By Patient    Appetite Fair;Improved    Best Intake of Supplement   Pt reports drinking at all meals.             Anthropometrics       09/01/17 1100    Anthropometrics    Height 172.7 cm (67.99\")    RD Documented Current Weight  68.2 kg (150 lb 5.7 oz)    Ideal Body Weight (IBW)    Ideal Body Weight (IBW), Male (kg) 70.87    Body Mass Index (BMI)    BMI Grade 19.1 - 24.9 - normal            Labs/Tests/Procedures/Meds       09/01/17 1101    Labs/Tests/Procedures/Meds    Labs/Tests Review Reviewed;Glucose;BUN;Na+;WBC;Hgb Hct    Medication Review Reviewed, pertinent;Other (comment)   Megace    Significant Vitals reviewed            Physical Findings       09/01/17 1102    Physical Findings/Assessment    Additional Documentation Physical Appearance (Group)    Physical Appearance    Skin --   Dylan score 21/21              Nutrition Prescription Ordered       09/01/17 1103    Nutrition Prescription PO    Current PO Diet Regular    Fluid Consistency Thin    Supplement Glucerna Shake    Supplement Frequency 3 times a day    Common Modifiers Consistent Carbohydrate            Evaluation of Received Nutrient/Fluid Intake       09/01/17 1106    Evaluation of Received Nutrient/Fluid Intake    Number of Days Evaluated 1 day    Nutrition Delivered Fluid Evaluation    Fluid Intake Evaluation    Oral Fluid " (mL) 960    Total Fluid Intake (mL) 960    PO Evaluation    Number of Meals 9    % PO Intake 44              Problem/Interventions:        Problem 1       09/01/17 1108    Nutrition Diagnoses Problem 1    Problem 1 Inadequate Nutrient Intake    Etiology (related to) Medical Diagnosis    Oncology Lung cancer    Percent (%) intake recorded 44 %    Over number of meals 9    Reported/Observed By Patient    Appetite Fair                    Intervention Goal       09/01/17 1115    Intervention Goal    General Meet nutritional needs for age/condition;Reduce/improve symptoms;Maintain nutrition    PO Increase intake;Meet estimated needs    Weight No significant weight loss            Nutrition Intervention       09/01/17 1115    Nutrition Intervention    RD/Tech Action Follow Tx progress;Encourage intake              Education/Evaluation       09/01/17 1115    Monitor/Evaluation    Monitor Per protocol   No D/C needs noted at this time. RD will continue to follow pt in [facility].        Comments:      Electronically signed by:  Kristina Quintero RD  09/01/17 11:23 AM

## 2017-09-01 NOTE — PROGRESS NOTES
PULMONARY AND CRITICAL CARE PROGRESS NOTE - [facility]  Patient: Gil Peters    1940   [id number]  09/01/17   9:18 AM  Referring Provider: Nicho Gould MD  Chief Complaint: respiratory failure with hypoxemia   Interval history: Patient continues to report cough mostly at night. Intermittent sputum production. CXR stable with persistent left lung changes. Still on supplemental oxygen. Still reports weakness with PT effort. No problems with prednisone  Review of Systems:   Review of Systems   Constitutional: Negative for chills and fever.   HENT: Negative for postnasal drip.    Respiratory: Positive for cough. Negative for shortness of breath and wheezing.    Cardiovascular: Negative for chest pain and palpitations.   Skin: Negative for rash.   Neurological: Positive for weakness. Negative for light-headedness.   All other systems reviewed and are negative.  Physical Exam:  Vital signs: t 98.6  bp 105/66  p 100  r 20  Sat 99 2 lpm  Physical Exam   Constitutional: He is oriented to person, place, and time. He appears well-developed and well-nourished. No distress. Nasal cannula in place.   HENT:   Head: Normocephalic and atraumatic.   Nose: Nose normal.   Eyes: Conjunctivae are normal. No scleral icterus.   Neck: Normal range of motion. Neck supple.   Cardiovascular: Normal rate.  Exam reveals no friction rub.    No murmur heard.  Pulmonary/Chest: Effort normal. No respiratory distress. He has decreased breath sounds. He has no wheezes. He has no rales.   Abdominal: Soft.   Musculoskeletal: Normal range of motion. He exhibits no edema.   Neurological: He is alert and oriented to person, place, and time.   Skin: Skin is warm and dry.   Psychiatric: Thought content normal.         Pulmonary Assessment:  1. Severe hypoxic respiratory failure, acute on chronic, improved  2. Lung CA stable  3. Atelectasis of left lung stable  4. Consolidation of left lung stable  5. Radiation  pneumonitis  6. Mild anemia  7. Mild hyponatremia  8. Hypoglycemia  9. Leukocytosis   Recommend:   · Continue oxygen at 2lpm  · Continue symbicort, atrovent/xopenex nebs, mucinex   · Continue Prednisone 20mg po daily   · Encouraged to mobilize as much as tolerated  · Will f/u on Monday but call over the weekend if there are any new issues  · Could consider adding mucolytics  Electronically signed by FELICITA Li on 9/1/2017 at 9:18 AM      Physician substantive contribution:  Pertinent symptoms/interval history include: some cough  Respiratory exam shows pertinent findings of:diminished breath sounds.  Plan includes: continue steroids at current dose.  I will recheck monday  I have seen and examined patient personally, performing a face-to-face diagnostic evaluation with plan of care reviewed and developed with APRN and nursing staff. I have addended and/or modified the above history of present illness, physical examination, and assessment and plan to reflect my findings and impressions. Essential elements of the care plan were discussed with APRN above.  Agree with findings and assessment/plan as documented above.    Electronically signed by Joe Feliciano MD, on 9/1/2017, 9:34 AM

## 2017-09-01 NOTE — PROGRESS NOTES
[facility]  JULIETTE Michaels M.D.  FELICITA Llamas      Internal Medicine Progress Note    9/1/2017   10:07 AM    Name:  Gil Peters  MRN:    [id number]     Acct:     [id number]   Room:  [address] [place] [address] Day: 0     Admit Date: 8/23/2017  9:04 PM  PCP: Bong Barnett MD    Subjective:     C/C: remains very weak    Interval History: Status:  Improved. Up to chair. No family at bedside. No complaints of pain. Progressing with therapy.  Sats stable with o2 at 2 lpm. Feeling better today.     Review of Systems   Constitution: Positive for weakness and malaise/fatigue. Negative for chills, decreased appetite, weight gain and weight loss.   HENT: Negative for congestion, ear discharge, hoarse voice and tinnitus.    Eyes: Negative for blurred vision, discharge, visual disturbance and visual halos.   Cardiovascular: Positive for dyspnea on exertion. Negative for chest pain, claudication, irregular heartbeat, leg swelling, orthopnea and paroxysmal nocturnal dyspnea.   Respiratory: Positive for shortness of breath. Negative for cough, sputum production and wheezing.    Endocrine: Negative for cold intolerance, heat intolerance and polyuria.   Hematologic/Lymphatic: Negative for adenopathy. Does not bruise/bleed easily.   Skin: Negative for dry skin, itching and suspicious lesions.   Musculoskeletal: Negative for arthritis, back pain, falls, joint pain, muscle weakness and myalgias.   Gastrointestinal: Negative for abdominal pain, constipation, diarrhea, dysphagia and hematemesis.   Genitourinary: Negative for bladder incontinence, dysuria and frequency.   Neurological: Negative for aphonia, disturbances in coordination and dizziness.   Psychiatric/Behavioral: Negative for altered mental status, depression, memory loss and substance abuse. The patient does not have insomnia and is not nervous/anxious.        Medications:     Allergies: No Known Allergies    Current Meds:   Current  Facility-Administered Medications:   •  atorvastatin (LIPITOR) tablet 20 mg, 20 mg, Oral, Nightly, Nicho Gould MD  •  budesonide-formoterol (SYMBICORT) 80-4.5 MCG/ACT inhaler 2 puff, 2 puff, Inhalation, BID - RT, Nicho Golud MD  •  dextrose (D50W) solution 25 g, 25 g, Intravenous, Q15 Min PRN, Nicho Gould MD  •  dextrose (GLUTOSE) oral gel 15 g, 15 g, Oral, Q15 Min PRN, Nicho Gould MD  •  docusate sodium (COLACE) capsule 100 mg, 100 mg, Oral, BID, Nicho Gould MD  •  folic acid (FOLVITE) tablet 1 mg, 1 mg, Oral, Daily, Nicho Gould MD  •  glucagon (human recombinant) (GLUCAGEN DIAGNOSTIC) injection 1 mg, 1 mg, Subcutaneous, Q15 Min PRN, iNcho Gould MD  •  insulin lispro (humaLOG) injection 0-14 Units, 0-14 Units, Subcutaneous, 4x Daily With Meals & Nightly, Nicho Gould MD  •  ipratropium (ATROVENT) nebulizer solution 0.5 mg, 0.5 mg, Nebulization, Q4H - RT, Nicho Gould MD  •  levalbuterol (XOPENEX) nebulizer solution 1.25 mg, 1.25 mg, Nebulization, Q4H - RT, Joe Feliciano MD  •  megestrol (MEGACE) 40 MG/ML suspension 40 mg, 40 mg, Oral, Daily, Nicho Gould MD  •  polyethylene glycol (MIRALAX) packet 17 g, 17 g, Oral, Q24H, Nicho Gould MD  •  predniSONE (DELTASONE) tablet 20 mg, 20 mg, Oral, Daily With Breakfast, Joe Feliciano MD  •  predniSONE (DELTASONE) tablet 40 mg, 40 mg, Oral, Once, Joe Feliciano MD    Data:     Code Status:  No Order    Family History   Problem Relation Age of Onset   • Cancer Mother      ovarian   • Cancer Father      prostate    • Melanoma Child        Social History     Social History   • Marital status:      Spouse name: N/A   • Number of children: N/A   • Years of education: N/A     Occupational History   • Retired - [employer]      Social History Main Topics   • Smoking status: Former Smoker     Types: Cigarettes   • Smokeless tobacco: Never Used       "Comment: QUIT 41 YRS. AGO   • Alcohol use No   • Drug use: No   • Sexual activity: Defer     Other Topics Concern   • Not on file     Social History Narrative       Vitals:  Temp 98.6, P 100, R 20, /66, O2 100% (2lpm)  Ht 68\" (172.7 cm)  Wt 150 lb 6.4 oz (68.2 kg)  BMI 22.87 kg/m2  No data recorded.    I/O (24Hr):  No intake or output data in the 24 hours ending 09/01/17 1007    Labs and imaging:      Lab Results (last 24 hours)     Procedure Component Value Units Date/Time    POC Glucose Fingerstick [[id number]]  (Abnormal) Collected:  08/31/17 1133    Specimen:  Blood Updated:  08/31/17 1655     Glucose 320 (H) mg/dL       : DEVENDRA Mccoyt CarolynMeter ID: [id number]       POC Glucose Fingerstick [[id number]]  (Abnormal) Collected:  08/31/17 1640    Specimen:  Blood Updated:  08/31/17 1656     Glucose 240 (H) mg/dL       : DEVENDRA Mccoyt CarolynMeter ID: [id number]       POC Glucose Fingerstick [[id number]]  (Abnormal) Collected:  08/31/17 2038    Specimen:  Blood Updated:  08/31/17 2049     Glucose 227 (H) mg/dL       : TRUDY Callowayormick TimMeter ID: [id number]       POC Glucose Fingerstick [[id number]]  (Abnormal) Collected:  09/01/17 0621    Specimen:  Blood Updated:  09/01/17 0632     Glucose 207 (H) mg/dL       : TRUDY Callowayormick TimMeter ID: [id number]           Xr Chest 1 View    Result Date: 8/23/2017  Narrative: History: 76-year-old evaluated for port.  Reference: Chest CT 06/01/2017.  Findings: Frontal chest radiograph performed.  Chronic dense opacities throughout the left lung with only minimally aerated lung seen. Associated volume loss. Probable chronic left pleural effusion as well. Chronic right perihilar opacities. No pneumothorax. Left Port-A-Cath, tip at cavoatrial junction.      Impression: Impression: Chronic appearing opacities compared to June 1 CT. Subtle change would need CT for assessment. This report was finalized on 08/23/2017 22:22 by  Mik Prater, " .    Xr Chest Pa & Lateral    Result Date: 8/30/2017  Narrative: EXAMINATION: XR CHEST PA AND LATERAL- 8/30/2017 10:08 AM CDT  HISTORY: Radiation pneumonitis  COMPARISON: 08/23/2017  FINDINGS: Left subclavian Port-A-Cath remains in place with tip just inside the right atrium. There is redemonstration of near complete opacification of the left hemithorax, similar to the previous exam, with minimal aeration at the left lung apex. Chronic interstitial opacities are seen on the right with areas of scarring emanating from the right hilum. Surgical clips are seen in the region of the left hilum. The aorta is tortuous with atherosclerotic calcifications. The heart appears normal in size. There is elevation of the left hemidiaphragm, stable. No gross bony abnormality is identified.      Impression: Overall stable appearance of the chest when compared to the exam from one week prior. Near-complete opacification of the left hemithorax is unchanged and likely reflects volume loss with atelectasis. A superimposed infectious process or pleural effusion cannot be excluded. Correlate clinically. This report was finalized on 08/30/2017 10:11 by Dr. Jakob Delgado MD.    Physical Examination:        Physical Exam   Constitutional: He is oriented to person, place, and time. He appears well-developed and well-nourished.   HENT:   Head: Normocephalic and atraumatic.   Nose: Nose normal.   Mouth/Throat: Oropharynx is clear and moist.   Eyes: EOM are normal. Pupils are equal, round, and reactive to light.   Neck: Normal range of motion. Neck supple.   Cardiovascular: Normal rate, regular rhythm, normal heart sounds and intact distal pulses.    Pulmonary/Chest: Effort normal. He has decreased breath sounds.   Abdominal: Soft. Bowel sounds are normal.   Musculoskeletal: Normal range of motion.   Neurological: He is alert and oriented to person, place, and time. He has normal reflexes.   Skin: Skin is warm and dry.   Psychiatric: He has a  normal mood and affect. His behavior is normal.         Assessment:        Active Problems:    * No active hospital problems. *    Past Medical History:   Diagnosis Date   • Cataract Extraction    • Hyperlipidemia    • Hypertension     low since taking radiation   • Lung cancer    • Prostate cancer         Plan:        1. Acute Hypoxic Respiratory Failure  2. NSCL of LLL  3. Bilateral CAP  4. Failure to Thrive  5. DM Type II with hyper/hypoglycemia  6. Leukocytosis    Continue current treatment.  Monitor counts. Increase activity as tolerated. Continue weaning efforts. Add scheduled mealtime insulin.     Electronically signed by FELICITA Almodovar on 9/1/2017 at 10:07 AM

## 2017-09-01 NOTE — PROGRESS NOTES
Progress Note    Patient name: Gil Peters  Patient : 1940  VISIT # [id number]  MR #[id number]  Room: 588    Subjective  No significant change, continues on 2 L per NC. Tolerating physical therapy and eager to participate. Feeling better.    Interval History:  Mr. Gil Peters is a 76 year old  gentleman who holds a diagnosis of non-small cell lung cancer.   Gil has completed concurrent therapy with XRT and 4 cycles of carboplatin/Alimta for his locally advanced recurrent lung cancer.     He last received Cycle #2 maintenace Alimta (with 25% dose reduction) on nd has been on hold since that time due to significant fatigue.      Re-staging scans 2017 showed a consolidation RLL concerning for atelectasis vs radiation pneumonitis vs tumor progression. He was seen by pulmonary with bronchoscopy performed that was unremarkable. He was given antibiotics and steroids with marked improvement. On 17, Gil had progressive dyspnea and was arranged for home O2.     He was admitted to  8/15/17 - 17 for acute respiratory failure, requiring high flow oxygen. CT of chest 17 showed bilateral pneumonic infiltrate more severe in the [place] and left side pleural effusion.  He was treated for pneumonia with cefepime and Levaquin as well as solumedrol and IV diuretics. Radiation pneumonitis, lymphangitic carcinomatosis or progression of lung cancer however cannot be excluded.     Gil was transferred to LTAC at [facility] on 17 due to continued high flow oxygen requirements, continuing weaning efforts.  Oncology consultation is requested for continuity of care.    Diagnosis  · Stage 0- 2015   · Recurrent NSCLC-adenocarcinoma (Locally advanced)  · Molecular profile: EGFR/ALK/ROS negative  Treatment summary:  · Left lower lobe lobectomy 6/10/2015   · Carboplatin/Alimta ×4 cycles adjuvant completed 2015   · Carboplatin /Alimta x 4 cycles(2017->2017) with  concurrent XRT for a total of 6000 cGy given from 12/4/2016-02/10/2017 over 30 fractions.   · Alimta maintenance therapy every 3 weeks initiated on 4/5/2017. Cycle #2 (25% dose reduction) on 5/17/2017 and held afterwards due to poor PS and fatigue.   · June-August 2017- he received steroids for radiation pneumonitis, with marked improvement of his respiratory symptoms. Alimta maintenance on hold.  Cancer history- Locally relapsed(mediastinal) Lung Cancer-adenocarcinoma EGFR, ALK and ROS negative  The patient is a 76 years old male first seen by me on 11/18/2016 referred by Dr. Yin's office. The following are landmark events on this patient cancer history:  · Preoperative x-ray performed before a radical prostatectomy on 4/22/2014 showed a left lower lobe pulmonary infiltrate   · Referred to respiratory medicine/bronchoscopy 7/2/2014 was unrevealing.   · Repeat CT scan September 2014 compared with April 2014-no change in the infiltrate pattern.   · Repeat CT April 2015-lung nodule associated with infiltrated.   · 4/21/2015-left lower lobe final needle aspiration= well-differentiated papillary adenocarcinoma consistent with a lung primary demonstrating a lipidic growth pattern. TTF-1 positive, napsin A positive. CDX2 cytoplasmic positivity but this was not consistent with colon malignancy. PSA was negative   · 4/29/2016-PET CT scan with increased SUV uptake in the left lower lobe (SUV 7.6), mass measures 7.1 x 4.9 cm. Suspicious right hilar adenopathy.   · 6/10/2015-left lower lobe lobectomy-well-differentiated lung adenocarcinoma with predominant lepidic pattern. Margins negative. 5 peribronchial lymph nodes negative for metastatic disease. Tumor size 10.6 cm. No invasive component found. [id number] (stage 0). Molecular arkers (EGFR, ALK, ROS-1 were all negative)   · Adjuvant chemotherapy carboplatin/Alimta ×4 cycles completed October 2015   · Patient placed on surveillance follow-up with imaging  surveillance.  -------------------------------- relapse October 2016--------------------------  · 10/26/2016- PET/CT scan showing increased FDG uptake within the prevascular mediastinal lymph nodes (6.4). Hypermetabolic AP window and bilateral hilar lymph nodes (SUV 5.5). FDG activity of the left pleural thickening (SUV 5.5). Findings are consistent with recurrent disease. No evidence of disease below the diaphragm.   · 11/7/20160704-LJ-yzgigr biopsy ([facility]) mediastinal node consistent with recurrent well-differentiated papillary adenocarcinoma.   · 11/18/2016- Recommended again a platinum based treatment with Carboplatin/Alimta + RT followed by Alimta maintenance   · 11/18/2016- Guardant 360 sent- no clinically significant mutation found. (MET T913m)   · 11/28/2016 MRI brain re-staging- KIRSTIN   · 3/13/2017- CT scan of abdomen and pelvis revealed a periaortic lymph node in the upper abdomen measuring 12 mm compared to 9.5 mm and a second periaortic lymph node measuring 12.5 mm compared to 7.5 mm.   · 3/13/2017- CT scan of chest revealed extensive new nodular consolidation in the left mid lung with air bronchograms concerning for neoplasm or acute pneumonitis. Suspect acute pneumonitis secondary to XRT. No measurable mediastinal lymphadenopathy.   · 4/5/2017-started on maintenance therapy with Alimta 500 mg/m². NCI toxicity, fatigue grade 2. Patient requested to delay cycle #2 for 3 weeks. Cycle 2 delivered 5/17/2016 with 25% dose reduction Alimta.   · 6/1/2017- CT scan of chest revealed volume loss of the left hemothorax with elevation of the left hemidiaphragm and shift of the mediastinum structures. Near complete consolidation of the left lung with air bronchograms with minimal aerated lung appreciated in the apex and lung base. Developing an interval increase in infiltrate of nodular opacities in the right lung, perihilar/upper and lower lobe regions. Developing irregular nodular opacity RUL. RUL nodule measuring 5 mm,  previously measured 3 mm. (Dr. Whitman spoke with radiologist related to results with differential diagnosis being radation pneumonits vs pneumonia vs lung collapse versus progression of disease). He was treated with Levofloxacin x 7 days.   · 6/1/2017- CT scan of abdomen and pelvis revealed stable appearance from 3/13/2017. Mildly prominent periaortic lymph nodes, stable. No evidence of neoplastic disease.   · 06/08/2017- He was seen by pulmonary Dr Joe Feliciano.   · 06/13/2017- Bronchoscopy was performed and did not reveal any obstructive lesion. Cytology and others BAL studies were unremarkable. He wa spescribed inhaled steroids and long B2 agonists.   · 06/26/2017- Mild improvement of his respiratory symptoms. We discussed about giving steroids for anorexia, possible radiation pneumonitis and fatigue. Continue to hold alimta maintenance due to poor performance. Prednisone prescribed 1 mg to take   · 7/28/2017- Alimta maintenance on hold. He was given steroids during the last visit and has gained about 5 pounds. His respiratory symptoms have improved markedly. Therefore, I believe that this was consistent with radiation pneumonitis. He wants to hold his Alimta until the next set of scans in September 2017.  -----------------------------------------------------------------------------------------  Cancer history #2-Prostate cancer (GS 3+4) 04/2014  · Radical prostatectomy on 4/22/2014 [id number]   · PSA <0.075 01/04/2017.  This is being followed by his urologist.       REVIEW OF SYSTEMS:   History obtained from chart review and the patient  General: positive for  - weakness  ENT: negative for - oral lesions  Allergy and Immunology: negative   Hematological and Lymphatic: negative for - weight loss  Respiratory: positive for - cough and shortness of breath, improved  Cardiovascular: negative for - chest pain or palpitations  Gastrointestinal: negative for - appetite improved, bowels moved well  8/28/17  Genito-Urinary: negative for - dysuria or hematuria  Musculoskeletal: positive for - generalized weakness  Neurological: negative for - confusion, dizziness or headaches  Dermatological: negative for pruritus and rash    Objective     Vital Signs 0400: b/p 90/53, HR 94, RR 20, Temp 96.8    O2 sat % on O2 2L/min in past 24 hours     PHYSICAL EXAM:  General Appearance: Alert, cooperative, in no acute distress, spouse at bedside  Head: Normocephalic, without obvious abnormality, atraumatic  Eyes: Normal conjunctivae and sclerae normal, anicteric, no pallor, corneas clear  Ears: Ears appear intact with no abnormalities noted, hearing grossly normal  Throat: No oral lesions, no thrush, oral mucosa moist  Neck: No adenopathy, supple, trachea midline, no JVD  Lungs: improved air movement on left, otherwise clear to auscultation, respirations regular, even and unlabored, O2 2L/min via NC in use.  Heart: Regular rhythm and minimally tachycardic  Abdomen: Normal bowel sounds, no masses, no organomegaly, soft non-tender, non-distended, no guarding, no rebound tenderness  Genitalia: Deferred  Extremities: Moves all extremities well, no edema, no cyanosis, no redness  Skin: No bleeding, bruising or rash  Lymph nodes: No palpable adenopathy  Neurologic: Grossly intact though not formally tested    CBC    Results from last 7 days  Lab Units 08/31/17  0530 08/28/17  0437   WBC 10*3/mm3 13.87* 17.57*   HEMOGLOBIN g/dL 11.6* 12.6*   HEMATOCRIT % 36.6* 39.3*   PLATELETS 10*3/mm3 251 267   LYMPHOCYTE % % 2.0* 3.0*   MONOCYTES % % 3.0* 5.0       CMP  Lab Results   Component Value Date    GLUCOSE 135 (H) 08/31/2017    BUN 19 08/31/2017    CREATININE 0.56 08/31/2017    EGFRIFNONA 141 08/31/2017    BCR 33.9 (H) 08/31/2017    CO2 28.0 08/31/2017    CALCIUM 8.4 08/31/2017    ALBUMIN 3.10 (L) 08/24/2017    LABIL2 1.2 08/24/2017    AST 44 08/24/2017    ALT 65 (H) 08/24/2017           Assessment/Plan     1.  Locally advanced  recurrent NSCLC (adenocarcinoma)  -S/p LLL lobectomy 6/10/15; 4 cycles adjuvant Carbo/Alimta completed 10/2015  -recurrence via biopsy mediastinal node 11/7/16  -XRT was delivered 12/4/2016 - 02/10/2017 for a total of 6000 cGy given over 30 fractions  -4 additional cycles Carbo/Alimta completed, followed by maintenance Alimta  -Cycle #2 of maintenace Alimta (with 25% dose reduction) was last received 5/17/17, held since that time due to poor performance status.   -Continue to hold treatment.     2. Acute respiratory failure, radiation pneumonitis per pulmonology  -has been able to wean high flow O2, currently O2 2L via NC per pulmonology. O2 sat %  -off antibiotics  - Dr. Feliciano resumed on 08/30/2017. Prednisone 20 mg daily.   -Recent echocardiogram LH showed a normal EF 55-60%  -CXR 8/23/17 shows chronic dense opacities throughout the left lung with only minimal aeration.  Probable chronic left pleural effusion.  Chronic right perihilar opacities. Chronic appearing opacities compared to chest CT dated 6/1/17. No pneumothorax.     - CXR 08/30/2017 revealed Overall stable appearance of the chest. Near-complete opacification of the left hemithorax is unchanged and likely reflects volume loss with atelectasis. A superimposed infectious process or pleural effusion cannot be excluded    3.  Hypoglycemia  -per attending     4.  Leukocytosis- afebrile  - WBC 17.57 08/28/17, improved off steroids  - WBC  13.87 08/31/2017    5.  Generalized weakness.  -continue PT    6.  Mild hypotension  - per attending    Disposition: Continue working with P.T for preparation for discharge home.  [facility] covering this week, Dr. Whitman will return on Tueday, September 5,2017. Please call with any concerns.          Leta Menchaca, FELICITA  09/01/17  7:06 AM

## 2017-09-02 LAB
GLUCOSE BLDC GLUCOMTR-MCNC: 129 MG/DL (ref 70–130)
GLUCOSE BLDC GLUCOMTR-MCNC: 143 MG/DL (ref 70–130)
GLUCOSE BLDC GLUCOMTR-MCNC: 187 MG/DL (ref 70–130)
GLUCOSE BLDC GLUCOMTR-MCNC: 288 MG/DL (ref 70–130)

## 2017-09-02 PROCEDURE — 97116 GAIT TRAINING THERAPY: CPT

## 2017-09-02 PROCEDURE — 63710000001 INSULIN LISPRO 100 UNIT/ML SOLUTION: Performed by: INTERNAL MEDICINE

## 2017-09-02 PROCEDURE — 63710000001 INSULIN LISPRO (HUMAN) PER 5 UNITS: Performed by: INTERNAL MEDICINE

## 2017-09-02 PROCEDURE — 63710000001 PREDNISONE PER 1 MG: Performed by: INTERNAL MEDICINE

## 2017-09-02 PROCEDURE — 82962 GLUCOSE BLOOD TEST: CPT

## 2017-09-02 PROCEDURE — 97535 SELF CARE MNGMENT TRAINING: CPT

## 2017-09-02 PROCEDURE — 63710000001 INSULIN LISPRO (HUMAN) PER 5 UNITS: Performed by: NURSE PRACTITIONER

## 2017-09-02 PROCEDURE — 97110 THERAPEUTIC EXERCISES: CPT

## 2017-09-02 NOTE — PROGRESS NOTES
[facility]  JULIETTE Michaels M.D.  FELICITA Llamas      Internal Medicine Progress Note    9/2/2017   1:47 PM    Name:  Gil Peters  MRN:    [id number]     Acct:     [id number]   Room:  [address] [place] [address] Day: 0     Admit Date: 8/23/2017  9:04 PM  PCP: Bong Barnett MD    Subjective:     C/C: remains very weak    Interval History: Status:  Improved. Up to chair. No family at bedside. No complaints of pain. Progressing with therapy.  Tolerating weaning well.    Review of Systems   Constitution: Positive for weakness and malaise/fatigue. Negative for chills, decreased appetite, weight gain and weight loss.   HENT: Negative for congestion, ear discharge, hoarse voice and tinnitus.    Eyes: Negative for blurred vision, discharge, visual disturbance and visual halos.   Cardiovascular: Positive for dyspnea on exertion. Negative for chest pain, claudication, irregular heartbeat, leg swelling, orthopnea and paroxysmal nocturnal dyspnea.   Respiratory: Positive for shortness of breath. Negative for cough, sputum production and wheezing.    Endocrine: Negative for cold intolerance, heat intolerance and polyuria.   Hematologic/Lymphatic: Negative for adenopathy. Does not bruise/bleed easily.   Skin: Negative for dry skin, itching and suspicious lesions.   Musculoskeletal: Negative for arthritis, back pain, falls, joint pain, muscle weakness and myalgias.   Gastrointestinal: Negative for abdominal pain, constipation, diarrhea, dysphagia and hematemesis.   Genitourinary: Negative for bladder incontinence, dysuria and frequency.   Neurological: Negative for aphonia, disturbances in coordination and dizziness.   Psychiatric/Behavioral: Negative for altered mental status, depression, memory loss and substance abuse. The patient does not have insomnia and is not nervous/anxious.        Medications:     Allergies: No Known Allergies    Current Meds:   Current Facility-Administered  Medications:   •  atorvastatin (LIPITOR) tablet 20 mg, 20 mg, Oral, Nightly, Nicho Gould MD  •  budesonide-formoterol (SYMBICORT) 80-4.5 MCG/ACT inhaler 2 puff, 2 puff, Inhalation, BID - RT, Nicho Gould MD  •  dextrose (D50W) solution 25 g, 25 g, Intravenous, Q15 Min PRN, Nicho Gould MD  •  dextrose (GLUTOSE) oral gel 15 g, 15 g, Oral, Q15 Min PRN, Nicho Gould MD  •  docusate sodium (COLACE) capsule 100 mg, 100 mg, Oral, BID, Nicho Gould MD  •  folic acid (FOLVITE) tablet 1 mg, 1 mg, Oral, Daily, Nicho Gould MD  •  glucagon (human recombinant) (GLUCAGEN DIAGNOSTIC) injection 1 mg, 1 mg, Subcutaneous, Q15 Min PRN, Nicho Gould MD  •  insulin lispro (humaLOG) injection 0-14 Units, 0-14 Units, Subcutaneous, 4x Daily With Meals & Nightly, Nicho Gould MD  •  insulin lispro (humaLOG) injection 5 Units, 5 Units, Subcutaneous, TID With Meals, FELICITA Almodovar  •  ipratropium (ATROVENT) nebulizer solution 0.5 mg, 0.5 mg, Nebulization, Q4H - RT, Nicho Gould MD  •  levalbuterol (XOPENEX) nebulizer solution 1.25 mg, 1.25 mg, Nebulization, Q4H - RT, Joe Feliciano MD  •  megestrol (MEGACE) 40 MG/ML suspension 40 mg, 40 mg, Oral, Daily, Nicho Gould MD  •  polyethylene glycol (MIRALAX) packet 17 g, 17 g, Oral, Q24H, Nicho Gould MD  •  predniSONE (DELTASONE) tablet 20 mg, 20 mg, Oral, Daily With Breakfast, Joe Feliciano MD  •  predniSONE (DELTASONE) tablet 40 mg, 40 mg, Oral, Once, Joe Feliciano MD    Data:     Code Status:  No Order    Family History   Problem Relation Age of Onset   • Cancer Mother      ovarian   • Cancer Father      prostate    • Melanoma Child        Social History     Social History   • Marital status:      Spouse name: N/A   • Number of children: N/A   • Years of education: N/A     Occupational History   • Retired - [employer]      Social History Main Topics   • Smoking  "status: Former Smoker     Types: Cigarettes   • Smokeless tobacco: Never Used      Comment: QUIT 41 YRS. AGO   • Alcohol use No   • Drug use: No   • Sexual activity: Defer     Other Topics Concern   • Not on file     Social History Narrative       Vitals:  Temp 97.6, P 98, R 20, /58, O2 94% (2lpm)  Ht 67.99\" (172.7 cm)  Wt 150 lb 6.4 oz (68.2 kg)  BMI 22.87 kg/m2  No data recorded.    I/O (24Hr):  No intake or output data in the 24 hours ending 09/02/17 1347    Labs and imaging:      Lab Results (last 24 hours)     Procedure Component Value Units Date/Time    POC Glucose Fingerstick [[id number]]  (Abnormal) Collected:  09/01/17 1716    Specimen:  Blood Updated:  09/01/17 1741     Glucose 196 (H) mg/dL       : BOO AndersonuvinaMeter ID: [id number]       POC Glucose Fingerstick [[id number]]  (Abnormal) Collected:  09/01/17 2022    Specimen:  Blood Updated:  09/01/17 2033     Glucose 252 (H) mg/dL       : CLEMENTINA Mckeno BilliMeter ID: [id number]       POC Glucose Fingerstick [[id number]]  (Abnormal) Collected:  09/02/17 0604    Specimen:  Blood Updated:  09/02/17 0618     Glucose 143 (H) mg/dL       : MICHAEL Jacobson GMeter ID: [id number]       POC Glucose Fingerstick [[id number]]  (Normal) Collected:  09/02/17 1141    Specimen:  Blood Updated:  09/02/17 1156     Glucose 129 mg/dL       : BOO AndersonuvinaMeter ID: [id number]           Xr Chest 1 View    Result Date: 8/23/2017  Narrative: History: 76-year-old evaluated for port.  Reference: Chest CT 06/01/2017.  Findings: Frontal chest radiograph performed.  Chronic dense opacities throughout the left lung with only minimally aerated lung seen. Associated volume loss. Probable chronic left pleural effusion as well. Chronic right perihilar opacities. No pneumothorax. Left Port-A-Cath, tip at cavoatrial junction.      Impression: Impression: Chronic appearing opacities compared to June 1 CT. Subtle change would " need CT for assessment. This report was finalized on 08/23/2017 22:22 by  Mik Prater, .    Xr Chest Pa & Lateral    Result Date: 8/30/2017  Narrative: EXAMINATION: XR CHEST PA AND LATERAL- 8/30/2017 10:08 AM CDT  HISTORY: Radiation pneumonitis  COMPARISON: 08/23/2017  FINDINGS: Left subclavian Port-A-Cath remains in place with tip just inside the right atrium. There is redemonstration of near complete opacification of the left hemithorax, similar to the previous exam, with minimal aeration at the left lung apex. Chronic interstitial opacities are seen on the right with areas of scarring emanating from the right hilum. Surgical clips are seen in the region of the left hilum. The aorta is tortuous with atherosclerotic calcifications. The heart appears normal in size. There is elevation of the left hemidiaphragm, stable. No gross bony abnormality is identified.      Impression: Overall stable appearance of the chest when compared to the exam from one week prior. Near-complete opacification of the left hemithorax is unchanged and likely reflects volume loss with atelectasis. A superimposed infectious process or pleural effusion cannot be excluded. Correlate clinically. This report was finalized on 08/30/2017 10:11 by Dr. Jakob Delgado MD.    Physical Examination:        Physical Exam   Constitutional: He is oriented to person, place, and time. He appears well-developed and well-nourished.   HENT:   Head: Normocephalic and atraumatic.   Nose: Nose normal.   Mouth/Throat: Oropharynx is clear and moist.   Eyes: EOM are normal. Pupils are equal, round, and reactive to light.   Neck: Normal range of motion. Neck supple.   Cardiovascular: Normal rate, regular rhythm, normal heart sounds and intact distal pulses.    Pulmonary/Chest: Effort normal. He has decreased breath sounds.   Abdominal: Soft. Bowel sounds are normal.   Musculoskeletal: Normal range of motion.   Neurological: He is alert and oriented to person, place, and  time. He has normal reflexes.   Skin: Skin is warm and dry.   Psychiatric: He has a normal mood and affect. His behavior is normal.         Assessment:        Active Problems:    * No active hospital problems. *    Past Medical History:   Diagnosis Date   • Cataract Extraction    • Hyperlipidemia    • Hypertension     low since taking radiation   • Lung cancer    • Prostate cancer         Plan:        1. Acute Hypoxic Respiratory Failure  2. NSCL of LLL  3. Bilateral CAP  4. Failure to Thrive  5. DM Type II with hyper/hypoglycemia  6. Leukocytosis    Continue current treatment.  Monitor counts. Increase activity as tolerated. Continue weaning efforts. DC tele and pulse ox.    Electronically signed by Nicho Gould MD on 9/2/2017 at 1:47 PM

## 2017-09-03 LAB
GLUCOSE BLDC GLUCOMTR-MCNC: 125 MG/DL (ref 70–130)
GLUCOSE BLDC GLUCOMTR-MCNC: 154 MG/DL (ref 70–130)
GLUCOSE BLDC GLUCOMTR-MCNC: 236 MG/DL (ref 70–130)
GLUCOSE BLDC GLUCOMTR-MCNC: 305 MG/DL (ref 70–130)

## 2017-09-03 PROCEDURE — 97110 THERAPEUTIC EXERCISES: CPT

## 2017-09-03 PROCEDURE — 97535 SELF CARE MNGMENT TRAINING: CPT

## 2017-09-03 PROCEDURE — 63710000001 INSULIN LISPRO (HUMAN) PER 5 UNITS: Performed by: INTERNAL MEDICINE

## 2017-09-03 PROCEDURE — 63710000001 PREDNISONE PER 1 MG: Performed by: INTERNAL MEDICINE

## 2017-09-03 PROCEDURE — 97116 GAIT TRAINING THERAPY: CPT

## 2017-09-03 PROCEDURE — 63710000001 INSULIN LISPRO (HUMAN) PER 5 UNITS: Performed by: NURSE PRACTITIONER

## 2017-09-03 PROCEDURE — 82962 GLUCOSE BLOOD TEST: CPT

## 2017-09-03 NOTE — PROGRESS NOTES
[facility]  JULIETTE Michaels M.D.  FELICITA Llamas      Internal Medicine Progress Note    9/3/2017   12:26 PM    Name:  Gil Peters  MRN:    [id number]     Acct:     [id number]   Room:  [address] [place] [address] Day: 0     Admit Date: 8/23/2017  9:04 PM  PCP: Bong Barnett MD    Subjective:     C/C: remains very weak    Interval History: Status:  Improved. No family at bedside. No complaints of pain. Ambulating with therapy this am.    Review of Systems   Constitution: Positive for weakness and malaise/fatigue. Negative for chills, decreased appetite, weight gain and weight loss.   HENT: Negative for congestion, ear discharge, hoarse voice and tinnitus.    Eyes: Negative for blurred vision, discharge, visual disturbance and visual halos.   Cardiovascular: Positive for dyspnea on exertion. Negative for chest pain, claudication, irregular heartbeat, leg swelling, orthopnea and paroxysmal nocturnal dyspnea.   Respiratory: Positive for shortness of breath. Negative for cough, sputum production and wheezing.    Endocrine: Negative for cold intolerance, heat intolerance and polyuria.   Hematologic/Lymphatic: Negative for adenopathy. Does not bruise/bleed easily.   Skin: Negative for dry skin, itching and suspicious lesions.   Musculoskeletal: Negative for arthritis, back pain, falls, joint pain, muscle weakness and myalgias.   Gastrointestinal: Negative for abdominal pain, constipation, diarrhea, dysphagia and hematemesis.   Genitourinary: Negative for bladder incontinence, dysuria and frequency.   Neurological: Negative for aphonia, disturbances in coordination and dizziness.   Psychiatric/Behavioral: Negative for altered mental status, depression, memory loss and substance abuse. The patient does not have insomnia and is not nervous/anxious.        Medications:     Allergies: No Known Allergies    Current Meds:   Current Facility-Administered Medications:   •  atorvastatin (LIPITOR)  tablet 20 mg, 20 mg, Oral, Nightly, Nicho Gould MD  •  budesonide-formoterol (SYMBICORT) 80-4.5 MCG/ACT inhaler 2 puff, 2 puff, Inhalation, BID - RT, Nicho Gould MD  •  dextrose (D50W) solution 25 g, 25 g, Intravenous, Q15 Min PRN, Nicho Gould MD  •  dextrose (GLUTOSE) oral gel 15 g, 15 g, Oral, Q15 Min PRN, Nicho Gould MD  •  docusate sodium (COLACE) capsule 100 mg, 100 mg, Oral, BID, Nicho Gould MD  •  folic acid (FOLVITE) tablet 1 mg, 1 mg, Oral, Daily, Nicho Gould MD  •  glucagon (human recombinant) (GLUCAGEN DIAGNOSTIC) injection 1 mg, 1 mg, Subcutaneous, Q15 Min PRN, Nicho Gould MD  •  insulin lispro (humaLOG) injection 0-14 Units, 0-14 Units, Subcutaneous, 4x Daily With Meals & Nightly, Nicho Gould MD  •  insulin lispro (humaLOG) injection 5 Units, 5 Units, Subcutaneous, TID With Meals, FELICITA Almodovar  •  ipratropium (ATROVENT) nebulizer solution 0.5 mg, 0.5 mg, Nebulization, Q4H - RT, Nicho Gould MD  •  levalbuterol (XOPENEX) nebulizer solution 1.25 mg, 1.25 mg, Nebulization, Q4H - RT, Joe Feliciano MD  •  megestrol (MEGACE) 40 MG/ML suspension 40 mg, 40 mg, Oral, Daily, Nicho Gould MD  •  polyethylene glycol (MIRALAX) packet 17 g, 17 g, Oral, Q24H, Nicho Gould MD  •  predniSONE (DELTASONE) tablet 20 mg, 20 mg, Oral, Daily With Breakfast, Joe Feliciano MD  •  predniSONE (DELTASONE) tablet 40 mg, 40 mg, Oral, Once, Joe Feliciano MD    Data:     Code Status:  No Order    Family History   Problem Relation Age of Onset   • Cancer Mother      ovarian   • Cancer Father      prostate    • Melanoma Child        Social History     Social History   • Marital status:      Spouse name: N/A   • Number of children: N/A   • Years of education: N/A     Occupational History   • Retired - [employer]      Social History Main Topics   • Smoking status: Former Smoker     Types:  "Cigarettes   • Smokeless tobacco: Never Used      Comment: QUIT 41 YRS. AGO   • Alcohol use No   • Drug use: No   • Sexual activity: Defer     Other Topics Concern   • Not on file     Social History Narrative       Vitals:  Temp 99.6, P 78, R 24, /64, O2 94% (2lpm)  Ht 67.99\" (172.7 cm)  Wt 150 lb 6.4 oz (68.2 kg)  BMI 22.87 kg/m2  No data recorded.    I/O (24Hr):  No intake or output data in the 24 hours ending 09/03/17 1226    Labs and imaging:      Lab Results (last 24 hours)     Procedure Component Value Units Date/Time    POC Glucose Fingerstick [[id number]]  (Abnormal) Collected:  09/02/17 1717    Specimen:  Blood Updated:  09/02/17 1729     Glucose 288 (H) mg/dL       : BOO AndersonuvinaMeter ID: [id number]       POC Glucose Fingerstick [[id number]]  (Abnormal) Collected:  09/02/17 2146    Specimen:  Blood Updated:  09/02/17 2159     Glucose 187 (H) mg/dL       : NATALIIA Gomez KimMeter ID: [id number]       POC Glucose Fingerstick [[id number]]  (Normal) Collected:  09/03/17 0638    Specimen:  Blood Updated:  09/03/17 0649     Glucose 125 mg/dL       : NATALIIA Bravoton KimMeter ID: [id number]           Xr Chest 1 View    Result Date: 8/23/2017  Narrative: History: 76-year-old evaluated for port.  Reference: Chest CT 06/01/2017.  Findings: Frontal chest radiograph performed.  Chronic dense opacities throughout the left lung with only minimally aerated lung seen. Associated volume loss. Probable chronic left pleural effusion as well. Chronic right perihilar opacities. No pneumothorax. Left Port-A-Cath, tip at cavoatrial junction.      Impression: Impression: Chronic appearing opacities compared to June 1 CT. Subtle change would need CT for assessment. This report was finalized on 08/23/2017 22:22 by  Mik Prater, .    Xr Chest Pa & Lateral    Result Date: 8/30/2017  Narrative: EXAMINATION: XR CHEST PA AND LATERAL- 8/30/2017 10:08 AM CDT  HISTORY: Radiation pneumonitis  " COMPARISON: 08/23/2017  FINDINGS: Left subclavian Port-A-Cath remains in place with tip just inside the right atrium. There is redemonstration of near complete opacification of the left hemithorax, similar to the previous exam, with minimal aeration at the left lung apex. Chronic interstitial opacities are seen on the right with areas of scarring emanating from the right hilum. Surgical clips are seen in the region of the left hilum. The aorta is tortuous with atherosclerotic calcifications. The heart appears normal in size. There is elevation of the left hemidiaphragm, stable. No gross bony abnormality is identified.      Impression: Overall stable appearance of the chest when compared to the exam from one week prior. Near-complete opacification of the left hemithorax is unchanged and likely reflects volume loss with atelectasis. A superimposed infectious process or pleural effusion cannot be excluded. Correlate clinically. This report was finalized on 08/30/2017 10:11 by Dr. Jakob Delgado MD.    Physical Examination:        Physical Exam   Constitutional: He is oriented to person, place, and time. He appears well-developed and well-nourished.   HENT:   Head: Normocephalic and atraumatic.   Nose: Nose normal.   Mouth/Throat: Oropharynx is clear and moist.   Eyes: EOM are normal. Pupils are equal, round, and reactive to light.   Neck: Normal range of motion. Neck supple.   Cardiovascular: Normal rate, regular rhythm, normal heart sounds and intact distal pulses.    Pulmonary/Chest: Effort normal. He has decreased breath sounds.   Abdominal: Soft. Bowel sounds are normal.   Musculoskeletal: Normal range of motion.   Neurological: He is alert and oriented to person, place, and time. He has normal reflexes.   Skin: Skin is warm and dry.   Psychiatric: He has a normal mood and affect. His behavior is normal.         Assessment:        Active Problems:    * No active hospital problems. *    Past Medical History:    Diagnosis Date   • Cataract Extraction    • Hyperlipidemia    • Hypertension     low since taking radiation   • Lung cancer    • Prostate cancer         Plan:        1. Acute Hypoxic Respiratory Failure  2. NSCL of LLL  3. Bilateral CAP  4. Failure to Thrive  5. DM Type II with hyper/hypoglycemia  6. Leukocytosis    Continue current treatment.  Monitor counts. Increase activity as tolerated. Continue weaning efforts. Up with therapy.    Electronically signed by Nicho Gould MD on 9/3/2017 at 12:26 PM

## 2017-09-04 LAB
ANION GAP SERPL CALCULATED.3IONS-SCNC: 7 MMOL/L (ref 4–13)
BASOPHILS # BLD AUTO: 0 10*3/MM3 (ref 0–0.2)
BASOPHILS NFR BLD AUTO: 0 % (ref 0–2)
BUN BLD-MCNC: 21 MG/DL (ref 5–21)
BUN/CREAT SERPL: 38.9 (ref 7–25)
CALCIUM SPEC-SCNC: 8.6 MG/DL (ref 8.4–10.4)
CHLORIDE SERPL-SCNC: 101 MMOL/L (ref 98–110)
CO2 SERPL-SCNC: 27 MMOL/L (ref 24–31)
CREAT BLD-MCNC: 0.54 MG/DL (ref 0.5–1.4)
DEPRECATED RDW RBC AUTO: 56 FL (ref 40–54)
EOSINOPHIL # BLD AUTO: 0.07 10*3/MM3 (ref 0–0.7)
EOSINOPHIL NFR BLD AUTO: 0.6 % (ref 0–4)
ERYTHROCYTE [DISTWIDTH] IN BLOOD BY AUTOMATED COUNT: 17 % (ref 12–15)
GFR SERPL CREATININE-BSD FRML MDRD: 148 ML/MIN/1.73
GLUCOSE BLD-MCNC: 168 MG/DL (ref 70–100)
GLUCOSE BLDC GLUCOMTR-MCNC: 140 MG/DL (ref 70–130)
GLUCOSE BLDC GLUCOMTR-MCNC: 157 MG/DL (ref 70–130)
GLUCOSE BLDC GLUCOMTR-MCNC: 172 MG/DL (ref 70–130)
GLUCOSE BLDC GLUCOMTR-MCNC: 287 MG/DL (ref 70–130)
HCT VFR BLD AUTO: 35.3 % (ref 40–52)
HGB BLD-MCNC: 11 G/DL (ref 14–18)
IMM GRANULOCYTES # BLD: 0.22 10*3/MM3 (ref 0–0.03)
IMM GRANULOCYTES NFR BLD: 1.8 % (ref 0–5)
LYMPHOCYTES # BLD AUTO: 0.91 10*3/MM3 (ref 0.72–4.86)
LYMPHOCYTES NFR BLD AUTO: 7.3 % (ref 15–45)
MCH RBC QN AUTO: 28.4 PG (ref 28–32)
MCHC RBC AUTO-ENTMCNC: 31.2 G/DL (ref 33–36)
MCV RBC AUTO: 91.2 FL (ref 82–95)
MONOCYTES # BLD AUTO: 0.96 10*3/MM3 (ref 0.19–1.3)
MONOCYTES NFR BLD AUTO: 7.7 % (ref 4–12)
NEUTROPHILS # BLD AUTO: 10.31 10*3/MM3 (ref 1.87–8.4)
NEUTROPHILS NFR BLD AUTO: 82.6 % (ref 39–78)
NT-PROBNP SERPL-MCNC: 1280 PG/ML (ref 0–1800)
PLATELET # BLD AUTO: 190 10*3/MM3 (ref 130–400)
PMV BLD AUTO: 10.6 FL (ref 6–12)
POTASSIUM BLD-SCNC: 4.2 MMOL/L (ref 3.5–5.3)
RBC # BLD AUTO: 3.87 10*6/MM3 (ref 4.8–5.9)
SODIUM BLD-SCNC: 135 MMOL/L (ref 135–145)
WBC NRBC COR # BLD: 12.47 10*3/MM3 (ref 4.8–10.8)

## 2017-09-04 PROCEDURE — 82962 GLUCOSE BLOOD TEST: CPT

## 2017-09-04 PROCEDURE — 83880 ASSAY OF NATRIURETIC PEPTIDE: CPT | Performed by: NURSE PRACTITIONER

## 2017-09-04 PROCEDURE — 85025 COMPLETE CBC W/AUTO DIFF WBC: CPT | Performed by: NURSE PRACTITIONER

## 2017-09-04 PROCEDURE — 63710000001 PREDNISONE PER 1 MG: Performed by: INTERNAL MEDICINE

## 2017-09-04 PROCEDURE — 63710000001 PREDNISONE PER 5 MG: Performed by: INTERNAL MEDICINE

## 2017-09-04 PROCEDURE — 63710000001 INSULIN LISPRO (HUMAN) PER 5 UNITS: Performed by: NURSE PRACTITIONER

## 2017-09-04 PROCEDURE — 63710000001 INSULIN LISPRO (HUMAN) PER 5 UNITS: Performed by: INTERNAL MEDICINE

## 2017-09-04 PROCEDURE — 97116 GAIT TRAINING THERAPY: CPT

## 2017-09-04 PROCEDURE — 97535 SELF CARE MNGMENT TRAINING: CPT

## 2017-09-04 PROCEDURE — 80048 BASIC METABOLIC PNL TOTAL CA: CPT | Performed by: NURSE PRACTITIONER

## 2017-09-04 NOTE — PROGRESS NOTES
[facility]  JULIETTE Michaels M.D.  FELICITA Llamas      Internal Medicine Progress Note    9/4/2017   10:18 AM    Name:  Gil Peters  MRN:    [id number]     Acct:     [id number]   Room:  [address] [place] [address] Day: 0     Admit Date: 8/23/2017  9:04 PM  PCP: Bong Barnett MD    Subjective:     C/C: remains very weak    Interval History: Status:  Improved. No family at bedside. No complaints of pain. Breathing easier.    Review of Systems   Constitution: Positive for weakness and malaise/fatigue. Negative for chills, decreased appetite, weight gain and weight loss.   HENT: Negative for congestion, ear discharge, hoarse voice and tinnitus.    Eyes: Negative for blurred vision, discharge, visual disturbance and visual halos.   Cardiovascular: Positive for dyspnea on exertion. Negative for chest pain, claudication, irregular heartbeat, leg swelling, orthopnea and paroxysmal nocturnal dyspnea.   Respiratory: Positive for shortness of breath. Negative for cough, sputum production and wheezing.    Endocrine: Negative for cold intolerance, heat intolerance and polyuria.   Hematologic/Lymphatic: Negative for adenopathy. Does not bruise/bleed easily.   Skin: Negative for dry skin, itching and suspicious lesions.   Musculoskeletal: Negative for arthritis, back pain, falls, joint pain, muscle weakness and myalgias.   Gastrointestinal: Negative for abdominal pain, constipation, diarrhea, dysphagia and hematemesis.   Genitourinary: Negative for bladder incontinence, dysuria and frequency.   Neurological: Negative for aphonia, disturbances in coordination and dizziness.   Psychiatric/Behavioral: Negative for altered mental status, depression, memory loss and substance abuse. The patient does not have insomnia and is not nervous/anxious.        Medications:     Allergies: No Known Allergies    Current Meds:   Current Facility-Administered Medications:   •  atorvastatin (LIPITOR) tablet 20 mg, 20  mg, Oral, Nightly, Nicho Gould MD  •  budesonide-formoterol (SYMBICORT) 80-4.5 MCG/ACT inhaler 2 puff, 2 puff, Inhalation, BID - RT, Nicho Gould MD  •  dextrose (D50W) solution 25 g, 25 g, Intravenous, Q15 Min PRN, Nicho Gould MD  •  dextrose (GLUTOSE) oral gel 15 g, 15 g, Oral, Q15 Min PRN, Nicho Gould MD  •  docusate sodium (COLACE) capsule 100 mg, 100 mg, Oral, BID, Nicho Gould MD  •  folic acid (FOLVITE) tablet 1 mg, 1 mg, Oral, Daily, Nicho Gould MD  •  glucagon (human recombinant) (GLUCAGEN DIAGNOSTIC) injection 1 mg, 1 mg, Subcutaneous, Q15 Min PRN, Nicho Gould MD  •  insulin lispro (humaLOG) injection 0-14 Units, 0-14 Units, Subcutaneous, 4x Daily With Meals & Nightly, Nicho Gould MD  •  insulin lispro (humaLOG) injection 5 Units, 5 Units, Subcutaneous, TID With Meals, FELICITA Almodovar  •  ipratropium (ATROVENT) nebulizer solution 0.5 mg, 0.5 mg, Nebulization, Q4H - RT, Nicho Gould MD  •  levalbuterol (XOPENEX) nebulizer solution 1.25 mg, 1.25 mg, Nebulization, Q4H - RT, Joe Feliciano MD  •  megestrol (MEGACE) 40 MG/ML suspension 40 mg, 40 mg, Oral, Daily, Nicho Gould MD  •  polyethylene glycol (MIRALAX) packet 17 g, 17 g, Oral, Q24H, Nicho Gould MD  •  predniSONE (DELTASONE) tablet 15 mg, 15 mg, Oral, Daily With Breakfast, Joe Feliciano MD  •  predniSONE (DELTASONE) tablet 40 mg, 40 mg, Oral, Once, Joe Feliciano MD    Data:     Code Status:  No Order    Family History   Problem Relation Age of Onset   • Cancer Mother      ovarian   • Cancer Father      prostate    • Melanoma Child        Social History     Social History   • Marital status:      Spouse name: N/A   • Number of children: N/A   • Years of education: N/A     Occupational History   • Retired - [employer]      Social History Main Topics   • Smoking status: Former Smoker     Types: Cigarettes   • Smokeless  "tobacco: Never Used      Comment: QUIT 41 YRS. AGO   • Alcohol use No   • Drug use: No   • Sexual activity: Defer     Other Topics Concern   • Not on file     Social History Narrative       Vitals:  Temp 97.1, P 96, R 22, /64, O2 96% (2lpm)  Ht 67.99\" (172.7 cm)  Wt 149 lb 12.8 oz (67.9 kg)  BMI 22.78 kg/m2  No data recorded.    I/O (24Hr):  No intake or output data in the 24 hours ending 09/04/17 1018    Labs and imaging:      Lab Results (last 24 hours)     Procedure Component Value Units Date/Time    POC Glucose Fingerstick [[id number]]  (Abnormal) Collected:  09/03/17 1232    Specimen:  Blood Updated:  09/03/17 1244     Glucose 305 (H) mg/dL       : IRENE LongApollo Commercial Real Estate FinanceMeter ID: [id number]       POC Glucose Fingerstick [[id number]]  (Abnormal) Collected:  09/03/17 1711    Specimen:  Blood Updated:  09/03/17 1722     Glucose 154 (H) mg/dL       : IRENE LongahMeter ID: [id number]       POC Glucose Fingerstick [[id number]]  (Abnormal) Collected:  09/03/17 2125    Specimen:  Blood Updated:  09/03/17 2136     Glucose 236 (H) mg/dL       : NATALIIA Gomez KimMeter ID: [id number]       POC Glucose Fingerstick [[id number]]  (Abnormal) Collected:  09/04/17 0606    Specimen:  Blood Updated:  09/04/17 0620     Glucose 157 (H) mg/dL       : NATALIIA Gomez KimMeter ID: [id number]       CBC & Differential [[id number]] Collected:  09/04/17 0550    Specimen:  Blood Updated:  09/04/17 0638    Narrative:       The following orders were created for panel order CBC & Differential.  Procedure                               Abnormality         Status                     ---------                               -----------         ------                     CBC Auto Differential[[id number]]        Abnormal            Final result                 Please view results for these tests on the individual orders.    CBC Auto Differential [[id number]]  (Abnormal) Collected:  09/04/17 0550    " Specimen:  Blood Updated:  09/04/17 0638     WBC 12.47 (H) 10*3/mm3      RBC 3.87 (L) 10*6/mm3      Hemoglobin 11.0 (L) g/dL      Hematocrit 35.3 (L) %      MCV 91.2 fL      MCH 28.4 pg      MCHC 31.2 (L) g/dL      RDW 17.0 (H) %      RDW-SD 56.0 (H) fl      MPV 10.6 fL      Platelets 190 10*3/mm3      Neutrophil % 82.6 (H) %      Lymphocyte % 7.3 (L) %      Monocyte % 7.7 %      Eosinophil % 0.6 %      Basophil % 0.0 %      Immature Grans % 1.8 %      Neutrophils, Absolute 10.31 (H) 10*3/mm3      Lymphocytes, Absolute 0.91 10*3/mm3      Monocytes, Absolute 0.96 10*3/mm3      Eosinophils, Absolute 0.07 10*3/mm3      Basophils, Absolute 0.00 10*3/mm3      Immature Grans, Absolute 0.22 (H) 10*3/mm3     Basic Metabolic Panel [[id number]]  (Abnormal) Collected:  09/04/17 0550    Specimen:  Blood Updated:  09/04/17 0650     Glucose 168 (H) mg/dL      BUN 21 mg/dL      Creatinine 0.54 mg/dL      Sodium 135 mmol/L      Potassium 4.2 mmol/L      Chloride 101 mmol/L      CO2 27.0 mmol/L      Calcium 8.6 mg/dL      eGFR Non African Amer 148 mL/min/1.73      BUN/Creatinine Ratio 38.9 (H)     Anion Gap 7.0 mmol/L     Narrative:       The MDRD GFR formula is only valid for adults with stable renal function between ages 18 and 70.    BNP [[id number]]  (Normal) Collected:  09/04/17 0550    Specimen:  Blood Updated:  09/04/17 0659     proBNP 1280.0 pg/mL         Xr Chest 1 View    Result Date: 8/23/2017  Narrative: History: 76-year-old evaluated for port.  Reference: Chest CT 06/01/2017.  Findings: Frontal chest radiograph performed.  Chronic dense opacities throughout the left lung with only minimally aerated lung seen. Associated volume loss. Probable chronic left pleural effusion as well. Chronic right perihilar opacities. No pneumothorax. Left Port-A-Cath, tip at cavoatrial junction.      Impression: Impression: Chronic appearing opacities compared to June 1 CT. Subtle change would need CT for assessment. This report was  finalized on 08/23/2017 22:22 by  Mik Prater, .    Xr Chest Pa & Lateral    Result Date: 8/30/2017  Narrative: EXAMINATION: XR CHEST PA AND LATERAL- 8/30/2017 10:08 AM CDT  HISTORY: Radiation pneumonitis  COMPARISON: 08/23/2017  FINDINGS: Left subclavian Port-A-Cath remains in place with tip just inside the right atrium. There is redemonstration of near complete opacification of the left hemithorax, similar to the previous exam, with minimal aeration at the left lung apex. Chronic interstitial opacities are seen on the right with areas of scarring emanating from the right hilum. Surgical clips are seen in the region of the left hilum. The aorta is tortuous with atherosclerotic calcifications. The heart appears normal in size. There is elevation of the left hemidiaphragm, stable. No gross bony abnormality is identified.      Impression: Overall stable appearance of the chest when compared to the exam from one week prior. Near-complete opacification of the left hemithorax is unchanged and likely reflects volume loss with atelectasis. A superimposed infectious process or pleural effusion cannot be excluded. Correlate clinically. This report was finalized on 08/30/2017 10:11 by Dr. Jakob Delgado MD.    Physical Examination:        Physical Exam   Constitutional: He is oriented to person, place, and time. He appears well-developed and well-nourished.   HENT:   Head: Normocephalic and atraumatic.   Nose: Nose normal.   Mouth/Throat: Oropharynx is clear and moist.   Eyes: EOM are normal. Pupils are equal, round, and reactive to light.   Neck: Normal range of motion. Neck supple.   Cardiovascular: Normal rate, regular rhythm, normal heart sounds and intact distal pulses.    Pulmonary/Chest: Effort normal. He has decreased breath sounds.   Abdominal: Soft. Bowel sounds are normal.   Musculoskeletal: Normal range of motion.   Neurological: He is alert and oriented to person, place, and time. He has normal reflexes.   Skin:  Skin is warm and dry.   Psychiatric: He has a normal mood and affect. His behavior is normal.         Assessment:        Active Problems:    * No active hospital problems. *    Past Medical History:   Diagnosis Date   • Cataract Extraction    • Hyperlipidemia    • Hypertension     low since taking radiation   • Lung cancer    • Prostate cancer         Plan:        1. Acute Hypoxic Respiratory Failure  2. NSCL of LLL  3. Bilateral CAP  4. Failure to Thrive  5. DM Type II with hyper/hypoglycemia  6. Leukocytosis    Continue current treatment.  Monitor counts. Increase activity as tolerated. Continue weaning efforts. Took shower with therapy assist this am.    Electronically signed by Nicho Gould MD on 9/4/2017 at 10:18 AM

## 2017-09-04 NOTE — PROGRESS NOTES
PULMONARY AND CRITICAL CARE PROGRESS NOTE - [facility]  Patient: Gil Peters    1940   [id number]  09/04/17   8:17 AM  Referring Provider: Nicho Gould MD  Chief Complaint: respiratory failure with hypoxemia   Interval history: Patient is doing very well from a pulmonary standpoint.  He and is wife are very concerned regarding his overall weakness.  Discussion with family with regarding patient's weakness per Dr. Feliciano.  He does have a mild non-productive cough that is not worse.  No other aggravating or allevitating factors.   Review of Systems:   Review of Systems   Constitutional: Negative for chills and fever.   HENT: Negative for postnasal drip.    Respiratory: Positive for cough. Negative for shortness of breath and wheezing.    Cardiovascular: Negative for chest pain and palpitations.   Skin: Negative for rash.   Neurological: Positive for weakness. Negative for light-headedness.   All other systems reviewed and are negative.  Physical Exam:  Vital signs: t 97.1  bp 102/64  p 96  r 22  Sat 96% 2 lpm  Physical Exam   Constitutional: He is oriented to person, place, and time. He appears well-developed and well-nourished. No distress. Nasal cannula in place.   HENT:   Head: Normocephalic and atraumatic.   Nose: Nose normal.   Eyes: Conjunctivae are normal. No scleral icterus.   Neck: Normal range of motion. Neck supple.   Cardiovascular: Normal rate.  Exam reveals no friction rub.    No murmur heard.  Pulmonary/Chest: Effort normal. No respiratory distress. He has decreased breath sounds. He has no wheezes. He has no rales.   Abdominal: Soft.   Musculoskeletal: Normal range of motion. He exhibits no edema.   Neurological: He is alert and oriented to person, place, and time.   Skin: Skin is warm and dry.   Psychiatric: Thought content normal.         Pulmonary Assessment:  1. Severe hypoxic respiratory failure, acute on chronic, improved  2. Lung CA  stable  3. Atelectasis of left lung stable  4. Consolidation of left lung stable  5. Radiation pneumonitis  6. Mild anemia  7. Mild hyponatremia  8. Hypoglycemia  9. Leukocytosis   Recommend:   · Continue oxygen at 2lpm  · Continue symbicort, atrovent/xopenex nebs  · Prednisone decreased to 15mg po daily   · Encouraged to mobilize as much as tolerated  · Discussion with wife and patient regarding weakness per Dr. Feliciano  Electronically signed by FELICITA Wagner on 9/4/2017 at 8:17 AM   Physician substantive contribution:  Pertinent symptoms/interval history include: main concern is weakness; cough is a little better than at its worst but about the same  Respiratory exam shows pertinent findings of:diminished breath sounds.  Plan includes: try to reduce steroids, prednisone 25 mg per day.  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 I have addended and/or modified the above history of present illness, physical examination, and assessment and plan to reflect my findings and impressions. Essential elements of the care plan were discussed with APRN above.  Agree with findings and assessment/plan as documented above.  Electronically signed by Joe Feliciano MD, on 9/4/2017, 8:45 AM

## 2017-09-05 LAB
CK SERPL-CCNC: 24 U/L (ref 0–203)
GLUCOSE BLDC GLUCOMTR-MCNC: 160 MG/DL (ref 70–130)
GLUCOSE BLDC GLUCOMTR-MCNC: 171 MG/DL (ref 70–130)
GLUCOSE BLDC GLUCOMTR-MCNC: 213 MG/DL (ref 70–130)
GLUCOSE BLDC GLUCOMTR-MCNC: 98 MG/DL (ref 70–130)

## 2017-09-05 PROCEDURE — 97535 SELF CARE MNGMENT TRAINING: CPT

## 2017-09-05 PROCEDURE — 97116 GAIT TRAINING THERAPY: CPT

## 2017-09-05 PROCEDURE — 63710000001 PREDNISONE PER 5 MG: Performed by: INTERNAL MEDICINE

## 2017-09-05 PROCEDURE — 63710000001 INSULIN LISPRO (HUMAN) PER 5 UNITS: Performed by: INTERNAL MEDICINE

## 2017-09-05 PROCEDURE — 82962 GLUCOSE BLOOD TEST: CPT

## 2017-09-05 PROCEDURE — 97110 THERAPEUTIC EXERCISES: CPT

## 2017-09-05 PROCEDURE — 63710000001 INSULIN LISPRO (HUMAN) PER 5 UNITS: Performed by: NURSE PRACTITIONER

## 2017-09-05 PROCEDURE — 82550 ASSAY OF CK (CPK): CPT | Performed by: INTERNAL MEDICINE

## 2017-09-05 NOTE — PROGRESS NOTES
Progress Note    Patient name: Gil Peters  Patient : 1940  VISIT # [id number]  MR #[id number]  Room: 588    Subjective  eating better, feeling better. Still weak. Participating in physical therapy.     Interval History:  Mr. Gil Peters is a 76 year old  gentleman who holds a diagnosis of non-small cell lung cancer.   Gil has completed concurrent therapy with XRT and 4 cycles of carboplatin/Alimta for his locally advanced recurrent lung cancer.     He last received Cycle #2 maintenace Alimta (with 25% dose reduction) on nd has been on hold since that time due to significant fatigue.      Re-staging scans 2017 showed a consolidation RLL concerning for atelectasis vs radiation pneumonitis vs tumor progression. He was seen by pulmonary with bronchoscopy performed that was unremarkable. He was given antibiotics and steroids with marked improvement. On 17, Gil had progressive dyspnea and was arranged for home O2.     He was admitted to  8/15/17 - 17 for acute respiratory failure, requiring high flow oxygen. CT of chest 17 showed bilateral pneumonic infiltrate more severe in the [place] and left side pleural effusion.  He was treated for pneumonia with cefepime and Levaquin as well as solumedrol and IV diuretics. Radiation pneumonitis, lymphangitic carcinomatosis or progression of lung cancer however cannot be excluded.     Gil was transferred to LTAC at [facility] on 17 due to continued high flow oxygen requirements, continuing weaning efforts.  Oncology consultation is requested for continuity of care.    Diagnosis  · Stage 0- 2015   · Recurrent NSCLC-adenocarcinoma (Locally advanced)  · Molecular profile: EGFR/ALK/ROS negative  Treatment summary:  · Left lower lobe lobectomy 6/10/2015   · Carboplatin/Alimta ×4 cycles adjuvant completed 2015   · Carboplatin /Alimta x 4 cycles(2017->2017) with concurrent XRT for a total of 6000 cGy  given from 12/4/2016-02/10/2017 over 30 fractions.   · Alimta maintenance therapy every 3 weeks initiated on 4/5/2017. Cycle #2 (25% dose reduction) on 5/17/2017 and held afterwards due to poor PS and fatigue.   · June-August 2017- he received steroids for radiation pneumonitis, with marked improvement of his respiratory symptoms. Alimta maintenance on hold.  Cancer history- Locally relapsed(mediastinal) Lung Cancer-adenocarcinoma EGFR, ALK and ROS negative  The patient is a 76 years old male first seen by me on 11/18/2016 referred by Dr. Yin's office. The following are landmark events on this patient cancer history:  · Preoperative x-ray performed before a radical prostatectomy on 4/22/2014 showed a left lower lobe pulmonary infiltrate   · Referred to respiratory medicine/bronchoscopy 7/2/2014 was unrevealing.   · Repeat CT scan September 2014 compared with April 2014-no change in the infiltrate pattern.   · Repeat CT April 2015-lung nodule associated with infiltrated.   · 4/21/2015-left lower lobe final needle aspiration= well-differentiated papillary adenocarcinoma consistent with a lung primary demonstrating a lipidic growth pattern. TTF-1 positive, napsin A positive. CDX2 cytoplasmic positivity but this was not consistent with colon malignancy. PSA was negative   · 4/29/2016-PET CT scan with increased SUV uptake in the left lower lobe (SUV 7.6), mass measures 7.1 x 4.9 cm. Suspicious right hilar adenopathy.   · 6/10/2015-left lower lobe lobectomy-well-differentiated lung adenocarcinoma with predominant lepidic pattern. Margins negative. 5 peribronchial lymph nodes negative for metastatic disease. Tumor size 10.6 cm. No invasive component found. [id number] (stage 0). Molecular arkers (EGFR, ALK, ROS-1 were all negative)   · Adjuvant chemotherapy carboplatin/Alimta ×4 cycles completed October 2015   · Patient placed on surveillance follow-up with imaging surveillance.  -------------------------------- relapse  October 2016--------------------------  · 10/26/2016- PET/CT scan showing increased FDG uptake within the prevascular mediastinal lymph nodes (6.4). Hypermetabolic AP window and bilateral hilar lymph nodes (SUV 5.5). FDG activity of the left pleural thickening (SUV 5.5). Findings are consistent with recurrent disease. No evidence of disease below the diaphragm.   · 11/7/20165877-GU-wgepry biopsy ([facility]) mediastinal node consistent with recurrent well-differentiated papillary adenocarcinoma.   · 11/18/2016- Recommended again a platinum based treatment with Carboplatin/Alimta + RT followed by Alimta maintenance   · 11/18/2016- Guardant 360 sent- no clinically significant mutation found. (MET T913m)   · 11/28/2016 MRI brain re-staging- [facility]IN   · 3/13/2017- CT scan of abdomen and pelvis revealed a periaortic lymph node in the upper abdomen measuring 12 mm compared to 9.5 mm and a second periaortic lymph node measuring 12.5 mm compared to 7.5 mm.   · 3/13/2017- CT scan of chest revealed extensive new nodular consolidation in the left mid lung with air bronchograms concerning for neoplasm or acute pneumonitis. Suspect acute pneumonitis secondary to XRT. No measurable mediastinal lymphadenopathy.   · 4/5/2017-started on maintenance therapy with Alimta 500 mg/m². NCI toxicity, fatigue grade 2. Patient requested to delay cycle #2 for 3 weeks. Cycle 2 delivered 5/17/2016 with 25% dose reduction Alimta.   · 6/1/2017- CT scan of chest revealed volume loss of the left hemothorax with elevation of the left hemidiaphragm and shift of the mediastinum structures. Near complete consolidation of the left lung with air bronchograms with minimal aerated lung appreciated in the apex and lung base. Developing an interval increase in infiltrate of nodular opacities in the right lung, perihilar/upper and lower lobe regions. Developing irregular nodular opacity RUL. RUL nodule measuring 5 mm, previously measured 3 mm. (Dr. Whitman spoke with  radiologist related to results with differential diagnosis being radation pneumonits vs pneumonia vs lung collapse versus progression of disease). He was treated with Levofloxacin x 7 days.   · 6/1/2017- CT scan of abdomen and pelvis revealed stable appearance from 3/13/2017. Mildly prominent periaortic lymph nodes, stable. No evidence of neoplastic disease.   · 06/08/2017- He was seen by pulmonary Dr Joe Feliciano.   · 06/13/2017- Bronchoscopy was performed and did not reveal any obstructive lesion. Cytology and others BAL studies were unremarkable. He wa spescribed inhaled steroids and long B2 agonists.   · 06/26/2017- Mild improvement of his respiratory symptoms. We discussed about giving steroids for anorexia, possible radiation pneumonitis and fatigue. Continue to hold alimta maintenance due to poor performance. Prednisone prescribed 1 mg to take   · 7/28/2017- Alimta maintenance on hold. He was given steroids during the last visit and has gained about 5 pounds. His respiratory symptoms have improved markedly. Therefore, I believe that this was consistent with radiation pneumonitis. He wants to hold his Alimta until the next set of scans in September 2017.  -----------------------------------------------------------------------------------------  Cancer history #2-Prostate cancer (GS 3+4) 04/2014  · Radical prostatectomy on 4/22/2014 [id number]   · PSA <0.075 01/04/2017.  This is being followed by his urologist.       REVIEW OF SYSTEMS:   History obtained from chart review and the patient  General: positive for  - weakness  ENT: negative for - oral lesions  Allergy and Immunology: negative   Hematological and Lymphatic: positive for - weight gain  Respiratory: positive for - cough and shortness of breath, improved. O2 weaned, now O2 2L via NC  Cardiovascular: negative for - chest pain or palpitations  Gastrointestinal: negative for - appetite improved  Genito-Urinary: negative for - dysuria or  hematuria  Musculoskeletal: positive for - generalized weakness  Neurological: negative for - confusion, dizziness or headaches  Dermatological: negative for pruritus and rash    Objective     O2 sat currently 93% on O2 2L/min      PHYSICAL EXAM:  General Appearance: Alert, cooperative, in no acute distress, spouse at bedside  Head: Normocephalic, without obvious abnormality, atraumatic  Eyes: Normal conjunctivae and sclerae normal, anicteric, no pallor, corneas clear  Ears: Ears appear intact with no abnormalities noted, hearing grossly normal  Throat: No oral lesions, no thrush, oral mucosa moist  Neck: No adenopathy, supple, trachea midline, no JVD  Lungs: improved air movement on left, otherwise clear to auscultation, respirations regular, even and unlabored, O2 2L/min via NC in use.  Heart: Regular rhythm and minimally tachycardic  Abdomen: Normal bowel sounds, no masses, no organomegaly, soft non-tender, non-distended, no guarding, no rebound tenderness  Genitalia: Deferred  Extremities: Moves all extremities well, no edema, no cyanosis, no redness  Skin: No bleeding, bruising or rash  Lymph nodes: No palpable adenopathy  Neurologic: Grossly intact though not formally tested    CBC    Results from last 7 days  Lab Units 09/04/17  0550 08/31/17  0530   WBC 10*3/mm3 12.47* 13.87*   HEMOGLOBIN g/dL 11.0* 11.6*   HEMATOCRIT % 35.3* 36.6*   PLATELETS 10*3/mm3 190 251   LYMPHOCYTE % %  --  2.0*   MONOCYTES % %  --  3.0*       CMP  Lab Results   Component Value Date    GLUCOSE 168 (H) 09/04/2017    BUN 21 09/04/2017    CREATININE 0.54 09/04/2017    EGFRIFNONA 148 09/04/2017    BCR 38.9 (H) 09/04/2017    CO2 27.0 09/04/2017    CALCIUM 8.6 09/04/2017    ALBUMIN 3.10 (L) 08/24/2017    LABIL2 1.2 08/24/2017    AST 44 08/24/2017    ALT 65 (H) 08/24/2017           Assessment/Plan     1.  Locally advanced recurrent NSCLC (adenocarcinoma)  -S/p LLL lobectomy 6/10/15; 4 cycles adjuvant Carbo/Alimta completed  10/2015  -recurrence via biopsy mediastinal node 11/7/16  -XRT was delivered 12/4/2016 - 02/10/2017 for a total of 6000 cGy given over 30 fractions  -4 additional cycles Carbo/Alimta completed, followed by maintenance Alimta  -Cycle #2 of maintenace Alimta (with 25% dose reduction) was last received 5/17/17, held since that time due to poor performance status.   -Continue to hold treatment.     2. Acute respiratory failure, radiation pneumonitis per pulmonology  -has been able to wean high flow O2, currently O2 2L via NC per pulmonology. O2 sat 93%  -off antibiotics  - Dr. Feliciano resumed Prednisone on 08/30/2017, currently Prednisone 15 mg daily.   -Recent echocardiogram LH showed a normal EF 55-60%    -CXR 8/23/17 shows chronic dense opacities throughout the left lung with only minimal aeration.  Probable chronic left pleural effusion.  Chronic right perihilar opacities. Chronic appearing opacities compared to chest CT dated 6/1/17. No pneumothorax.   - CXR 8/30/17 revealed Overall stable appearance of the chest. Near-complete opacification of the left hemithorax is unchanged and likely reflects volume loss with atelectasis. A superimposed infectious process or pleural effusion cannot be excluded     4.  Leukocytosis, improved. afebrile  - WBC  12.47 on 09/4/2017    5.  Deconditioning.  -continue PT  -encourage OOB  -Prednisone for steroid myopathy, per Dr. Feliciano    Continues to slowly improve. Discussed with patient, spouse and Dr. Feliciano.        Jodie Bellamy, APRN  09/05/17  7:00 AM

## 2017-09-05 NOTE — PROGRESS NOTES
[facility]  JULIETTE Michaels M.D.  FELICITA Llamas      Internal Medicine Progress Note    9/5/2017   10:51 AM    Name:  Gil Peters  MRN:    [id number]     Acct:     [id number]   Room:  [address] [place] [address] Day: 0     Admit Date: 8/23/2017  9:04 PM  PCP: Bong Barnett MD    Subjective:     C/C: remains very weak    Interval History: Status:  Improved. Up to chair. Friend at bedside. Feeling better today. Increased fatigue yesterday. Progressing with therapy.     Review of Systems   Constitution: Positive for weakness and malaise/fatigue. Negative for chills, decreased appetite, weight gain and weight loss.   HENT: Negative for congestion, ear discharge, hoarse voice and tinnitus.    Eyes: Negative for blurred vision, discharge, visual disturbance and visual halos.   Cardiovascular: Positive for dyspnea on exertion. Negative for chest pain, claudication, irregular heartbeat, leg swelling, orthopnea and paroxysmal nocturnal dyspnea.   Respiratory: Positive for shortness of breath. Negative for cough, sputum production and wheezing.    Endocrine: Negative for cold intolerance, heat intolerance and polyuria.   Hematologic/Lymphatic: Negative for adenopathy. Does not bruise/bleed easily.   Skin: Negative for dry skin, itching and suspicious lesions.   Musculoskeletal: Negative for arthritis, back pain, falls, joint pain, muscle weakness and myalgias.   Gastrointestinal: Negative for abdominal pain, constipation, diarrhea, dysphagia and hematemesis.   Genitourinary: Negative for bladder incontinence, dysuria and frequency.   Neurological: Negative for aphonia, disturbances in coordination and dizziness.   Psychiatric/Behavioral: Negative for altered mental status, depression, memory loss and substance abuse. The patient does not have insomnia and is not nervous/anxious.        Medications:     Allergies: No Known Allergies    Current Meds:   Current Facility-Administered  Medications:   •  budesonide-formoterol (SYMBICORT) 80-4.5 MCG/ACT inhaler 2 puff, 2 puff, Inhalation, BID - RT, Nicho Gould MD  •  dextrose (D50W) solution 25 g, 25 g, Intravenous, Q15 Min PRN, Nicho Gould MD  •  dextrose (GLUTOSE) oral gel 15 g, 15 g, Oral, Q15 Min PRN, Nicho Gould MD  •  docusate sodium (COLACE) capsule 100 mg, 100 mg, Oral, BID, Nicho Gould MD  •  folic acid (FOLVITE) tablet 1 mg, 1 mg, Oral, Daily, Nicho Gould MD  •  glucagon (human recombinant) (GLUCAGEN DIAGNOSTIC) injection 1 mg, 1 mg, Subcutaneous, Q15 Min PRN, Nicho Gould MD  •  insulin lispro (humaLOG) injection 0-14 Units, 0-14 Units, Subcutaneous, 4x Daily With Meals & Nightly, Nicho Gould MD  •  insulin lispro (humaLOG) injection 5 Units, 5 Units, Subcutaneous, TID With Meals, FELICITA Almodovar  •  ipratropium (ATROVENT) nebulizer solution 0.5 mg, 0.5 mg, Nebulization, Q4H - RT, Nicho Gould MD  •  levalbuterol (XOPENEX) nebulizer solution 1.25 mg, 1.25 mg, Nebulization, Q4H - RT, Joe Feliciano MD  •  megestrol (MEGACE) 40 MG/ML suspension 40 mg, 40 mg, Oral, Daily, Nicho Gould MD  •  polyethylene glycol (MIRALAX) packet 17 g, 17 g, Oral, Q24H, Nicho Gould MD  •  predniSONE (DELTASONE) tablet 15 mg, 15 mg, Oral, Daily With Breakfast, Joe Feliciano MD  •  predniSONE (DELTASONE) tablet 40 mg, 40 mg, Oral, Once, Joe Feliciano MD    Data:     Code Status:  No Order    Family History   Problem Relation Age of Onset   • Cancer Mother      ovarian   • Cancer Father      prostate    • Melanoma Child        Social History     Social History   • Marital status:      Spouse name: N/A   • Number of children: N/A   • Years of education: N/A     Occupational History   • Retired - [employer]      Social History Main Topics   • Smoking status: Former Smoker     Types: Cigarettes   • Smokeless tobacco: Never Used       "Comment: QUIT 41 YRS. AGO   • Alcohol use No   • Drug use: No   • Sexual activity: Defer     Other Topics Concern   • Not on file     Social History Narrative       Vitals:  Temp 97.5, P 90, R 23, /62, O2 99% (2lpm)  Ht 67.99\" (172.7 cm)  Wt 149 lb 12.8 oz (67.9 kg)  BMI 22.78 kg/m2  No data recorded.    I/O (24Hr):  No intake or output data in the 24 hours ending 09/05/17 1051    Labs and imaging:      Lab Results (last 24 hours)     Procedure Component Value Units Date/Time    POC Glucose Fingerstick [[id number]]  (Abnormal) Collected:  09/04/17 1140    Specimen:  Blood Updated:  09/04/17 1153     Glucose 172 (H) mg/dL       : SHARON HuynhenMeter ID: [id number]       POC Glucose Fingerstick [[id number]]  (Abnormal) Collected:  09/04/17 1708    Specimen:  Blood Updated:  09/04/17 1740     Glucose 140 (H) mg/dL       : RAGHAVENDRA Champion RMeter ID: [id number]       POC Glucose Fingerstick [[id number]]  (Abnormal) Collected:  09/04/17 2001    Specimen:  Blood Updated:  09/04/17 2012     Glucose 287 (H) mg/dL       : NORMA NewsomelinMeter ID: [id number]       POC Glucose Fingerstick [[id number]]  (Normal) Collected:  09/05/17 0625    Specimen:  Blood Updated:  09/05/17 0636     Glucose 98 mg/dL       : NORMA BoucheritlinMeter ID: [id number]       CK [[id number]]  (Normal) Collected:  09/05/17 0915    Specimen:  Blood Updated:  09/05/17 0942     Creatine Kinase 24 U/L         Xr Chest 1 View    Result Date: 8/23/2017  Narrative: History: 76-year-old evaluated for port.  Reference: Chest CT 06/01/2017.  Findings: Frontal chest radiograph performed.  Chronic dense opacities throughout the left lung with only minimally aerated lung seen. Associated volume loss. Probable chronic left pleural effusion as well. Chronic right perihilar opacities. No pneumothorax. Left Port-A-Cath, tip at cavoatrial junction.      Impression: Impression: Chronic appearing opacities " compared to June 1 CT. Subtle change would need CT for assessment. This report was finalized on 08/23/2017 22:22 by  Mik Prater, .    Xr Chest Pa & Lateral    Result Date: 8/30/2017  Narrative: EXAMINATION: XR CHEST PA AND LATERAL- 8/30/2017 10:08 AM CDT  HISTORY: Radiation pneumonitis  COMPARISON: 08/23/2017  FINDINGS: Left subclavian Port-A-Cath remains in place with tip just inside the right atrium. There is redemonstration of near complete opacification of the left hemithorax, similar to the previous exam, with minimal aeration at the left lung apex. Chronic interstitial opacities are seen on the right with areas of scarring emanating from the right hilum. Surgical clips are seen in the region of the left hilum. The aorta is tortuous with atherosclerotic calcifications. The heart appears normal in size. There is elevation of the left hemidiaphragm, stable. No gross bony abnormality is identified.      Impression: Overall stable appearance of the chest when compared to the exam from one week prior. Near-complete opacification of the left hemithorax is unchanged and likely reflects volume loss with atelectasis. A superimposed infectious process or pleural effusion cannot be excluded. Correlate clinically. This report was finalized on 08/30/2017 10:11 by Dr. Jakob Delgado MD.    Physical Examination:        Physical Exam   Constitutional: He is oriented to person, place, and time. He appears well-developed and well-nourished.   HENT:   Head: Normocephalic and atraumatic.   Nose: Nose normal.   Mouth/Throat: Oropharynx is clear and moist.   Eyes: EOM are normal. Pupils are equal, round, and reactive to light.   Neck: Normal range of motion. Neck supple.   Cardiovascular: Normal rate, regular rhythm, normal heart sounds and intact distal pulses.    Pulmonary/Chest: Effort normal. He has decreased breath sounds.   Abdominal: Soft. Bowel sounds are normal.   Musculoskeletal: Normal range of motion.   Neurological: He  is alert and oriented to person, place, and time. He has normal reflexes.   Skin: Skin is warm and dry.   Psychiatric: He has a normal mood and affect. His behavior is normal.         Assessment:        Active Problems:    * No active hospital problems. *    Past Medical History:   Diagnosis Date   • Cataract Extraction    • Hyperlipidemia    • Hypertension     low since taking radiation   • Lung cancer    • Prostate cancer         Plan:        1. Acute Hypoxic Respiratory Failure  2. NSCL of LLL  3. Bilateral CAP  4. Failure to Thrive  5. DM Type II with hyper/hypoglycemia  6. Leukocytosis    Continue current treatment.  Monitor counts. Increase activity as tolerated. Continue weaning efforts.     Electronically signed by FELICITA Almodovar on 9/5/2017 at 10:51 AM

## 2017-09-05 NOTE — PROGRESS NOTES
PULMONARY AND CRITICAL CARE PROGRESS NOTE - [facility]  Patient: Gil Peters    1940   [id number]  09/05/17   8:18 AM  Referring Provider: Nicho Gould MD  Chief Complaint: respiratory failure with hypoxemia   Interval history: Patient is doing very well from a pulmonary standpoint.  He remains weak at times but making progress. He was able to take a shower yesterday finally. Appetite seems back to baseline. Wife remains at the bedside. No other aggravating or allevitating factors.   Review of Systems:   Review of Systems   Constitutional: Negative for chills and fever.   HENT: Negative for postnasal drip.    Respiratory: Positive for cough. Negative for shortness of breath and wheezing.    Cardiovascular: Negative for chest pain and palpitations.   Skin: Negative for rash.   Neurological: Positive for weakness. Negative for light-headedness.   All other systems reviewed and are negative.  Physical Exam:  Vital signs: t 97.8  bp 107/62  p 106  r 20  Sat 99% 2 lpm  Physical Exam   Constitutional: He is oriented to person, place, and time. He appears well-developed and well-nourished. No distress. Nasal cannula in place.   HENT:   Head: Normocephalic and atraumatic.   Nose: Nose normal.   Eyes: Conjunctivae are normal. No scleral icterus.   Neck: Normal range of motion. Neck supple.   Cardiovascular: Normal rate.  Exam reveals no friction rub.    No murmur heard.  Pulmonary/Chest: Effort normal. No respiratory distress. He has decreased breath sounds. He has no wheezes. He has no rales.   Abdominal: Soft.   Musculoskeletal: Normal range of motion. He exhibits no edema.   Neurological: He is alert and oriented to person, place, and time.   Skin: Skin is warm and dry.   Psychiatric: Thought content normal.         Pulmonary Assessment:  1. Severe hypoxic respiratory failure, acute on chronic, improved  2. Lung CA stable  3. Atelectasis of left lung stable  4. Consolidation of  left lung stable  5. Radiation pneumonitis  6. Mild anemia  7. Mild hyponatremia-resolved  8. Hypoglycemia-better  9. Leukocytosis-mild  Recommend:   · Continue oxygen at 2lpm  · Continue symbicort, atrovent/xopenex nebs  · Continue Prednisone at current dosing   · Continue to strongly encourage PT/OT efforts   Electronically signed by FELICITA Li on 9/5/2017 at 8:18 AM   Physician substantive contribution:  Pertinent symptoms/interval history include: still weak and says he has no energy  Respiratory exam shows pertinent findings of:clear breath sounds.  Plan includes: check cpk.  Hold hmg-coA inhibitor.  Continue PT, continue lowest dose steroids as possible.  I have seen and examined patient personally, performing a face-to-face diagnostic evaluation with plan of care reviewed and developed with APRN and nursing staff. I have addended and/or modified the above history of present illness, physical examination, and assessment and plan to reflect my findings and impressions. Essential elements of the care plan were discussed with APRN above.  Agree with findings and assessment/plan as documented above.    Electronically signed by Joe Feliciano MD, on 9/5/2017, 8:39 AM

## 2017-09-06 PROBLEM — J70.0 RADIATION PNEUMONITIS (HCC): Status: ACTIVE | Noted: 2017-09-06

## 2017-09-06 PROBLEM — J96.21 ACUTE ON CHRONIC RESPIRATORY FAILURE WITH HYPOXIA (HCC): Status: ACTIVE | Noted: 2017-09-06

## 2017-09-06 PROBLEM — C34.90 NON-SMALL CELL LUNG CANCER (NSCLC): Status: ACTIVE | Noted: 2017-09-06

## 2017-09-06 LAB
GLUCOSE BLDC GLUCOMTR-MCNC: 103 MG/DL (ref 70–130)
GLUCOSE BLDC GLUCOMTR-MCNC: 166 MG/DL (ref 70–130)
GLUCOSE BLDC GLUCOMTR-MCNC: 172 MG/DL (ref 70–130)
GLUCOSE BLDC GLUCOMTR-MCNC: 202 MG/DL (ref 70–130)

## 2017-09-06 PROCEDURE — 63710000001 PREDNISONE PER 5 MG: Performed by: INTERNAL MEDICINE

## 2017-09-06 PROCEDURE — 97110 THERAPEUTIC EXERCISES: CPT

## 2017-09-06 PROCEDURE — 82962 GLUCOSE BLOOD TEST: CPT

## 2017-09-06 PROCEDURE — 97535 SELF CARE MNGMENT TRAINING: CPT

## 2017-09-06 PROCEDURE — 63710000001 INSULIN LISPRO (HUMAN) PER 5 UNITS: Performed by: NURSE PRACTITIONER

## 2017-09-06 PROCEDURE — 97116 GAIT TRAINING THERAPY: CPT

## 2017-09-06 PROCEDURE — 63710000001 INSULIN LISPRO (HUMAN) PER 5 UNITS: Performed by: INTERNAL MEDICINE

## 2017-09-06 NOTE — PROGRESS NOTES
"[facility]  Dwayne Gould M.D.  CARINE Gould M.D.  FELICITA Llamas      Internal Medicine Progress Note    9/6/2017   12:53 PM    Name:  Gil Peters  MRN:    [id number]     Acct:     [id number]   Room:  [address] [place] [address] Day: 0     Admit Date: 8/23/2017  9:04 PM  PCP: Bong Barnett MD    Subjective:     C/C: remains very weak    Interval History: Status:  Improved. Up to chair. Friend at bedside. Fell in the bathroom today - had been doing well and fairly independent and OT had just signed off. Patient states he stood off the commode and missed his walker and fell to his right knee. Patient denies any injury - states he was \"more scared than anything\".     Review of Systems   Constitution: Positive for weakness and malaise/fatigue. Negative for chills, decreased appetite, weight gain and weight loss.   HENT: Negative for congestion, ear discharge, hoarse voice and tinnitus.    Eyes: Negative for blurred vision, discharge, visual disturbance and visual halos.   Cardiovascular: Positive for dyspnea on exertion. Negative for chest pain, claudication, irregular heartbeat, leg swelling, orthopnea and paroxysmal nocturnal dyspnea.   Respiratory: Positive for shortness of breath. Negative for cough, sputum production and wheezing.    Endocrine: Negative for cold intolerance, heat intolerance and polyuria.   Hematologic/Lymphatic: Negative for adenopathy. Does not bruise/bleed easily.   Skin: Negative for dry skin, itching and suspicious lesions.   Musculoskeletal: Negative for arthritis, back pain, falls, joint pain, muscle weakness and myalgias.   Gastrointestinal: Negative for abdominal pain, constipation, diarrhea, dysphagia and hematemesis.   Genitourinary: Negative for bladder incontinence, dysuria and frequency.   Neurological: Negative for aphonia, disturbances in coordination and dizziness.   Psychiatric/Behavioral: Negative for altered mental status, depression, memory loss and substance " abuse. The patient does not have insomnia and is not nervous/anxious.        Medications:     Allergies: No Known Allergies    Current Meds:   Current Facility-Administered Medications:   •  budesonide-formoterol (SYMBICORT) 80-4.5 MCG/ACT inhaler 2 puff, 2 puff, Inhalation, BID - RT, Nicho Gould MD  •  dextrose (D50W) solution 25 g, 25 g, Intravenous, Q15 Min PRN, Nicho Gould MD  •  dextrose (GLUTOSE) oral gel 15 g, 15 g, Oral, Q15 Min PRN, Nicho Gould MD  •  docusate sodium (COLACE) capsule 100 mg, 100 mg, Oral, BID, Nicho Gould MD  •  folic acid (FOLVITE) tablet 1 mg, 1 mg, Oral, Daily, Nicho Gould MD  •  glucagon (human recombinant) (GLUCAGEN DIAGNOSTIC) injection 1 mg, 1 mg, Subcutaneous, Q15 Min PRN, Nicho Gould MD  •  insulin lispro (humaLOG) injection 0-14 Units, 0-14 Units, Subcutaneous, 4x Daily With Meals & Nightly, Nicho Gould MD  •  insulin lispro (humaLOG) injection 5 Units, 5 Units, Subcutaneous, TID With Meals, FELICITA Almodovar  •  ipratropium (ATROVENT) nebulizer solution 0.5 mg, 0.5 mg, Nebulization, Q4H - RT, Nicho Gould MD  •  levalbuterol (XOPENEX) nebulizer solution 1.25 mg, 1.25 mg, Nebulization, Q4H - RT, Joe Feliciano MD  •  megestrol (MEGACE) 40 MG/ML suspension 40 mg, 40 mg, Oral, Daily, Nicho Gould MD  •  polyethylene glycol (MIRALAX) packet 17 g, 17 g, Oral, Q24H, Nicho Gould MD  •  predniSONE (DELTASONE) tablet 15 mg, 15 mg, Oral, Daily With Breakfast, Joe Feliciano MD  •  predniSONE (DELTASONE) tablet 40 mg, 40 mg, Oral, Once, Joe Feliciano MD    Data:     Code Status:  No Order    Family History   Problem Relation Age of Onset   • Cancer Mother      ovarian   • Cancer Father      prostate    • Melanoma Child        Social History     Social History   • Marital status:      Spouse name: N/A   • Number of children: N/A   • Years of education: N/A  "    Occupational History   • Retired - [employer]      Social History Main Topics   • Smoking status: Former Smoker     Types: Cigarettes   • Smokeless tobacco: Never Used      Comment: QUIT 41 YRS. AGO   • Alcohol use No   • Drug use: No   • Sexual activity: Defer     Other Topics Concern   • Not on file     Social History Narrative       Vitals:  Temp 98.4, P 97, R 24, /68, O2 95% (1lpm)  Ht 67.99\" (172.7 cm)  Wt 149 lb 12.8 oz (67.9 kg)  BMI 22.78 kg/m2  No data recorded.    I/O (24Hr):  No intake or output data in the 24 hours ending 09/06/17 1253    Labs and imaging:      Lab Results (last 24 hours)     Procedure Component Value Units Date/Time    POC Glucose Fingerstick [[id number]]  (Abnormal) Collected:  09/05/17 1649    Specimen:  Blood Updated:  09/05/17 1701     Glucose 171 (H) mg/dL       : [id number] Luis Fernando PatMeter ID: [id number]       POC Glucose Fingerstick [[id number]]  (Abnormal) Collected:  09/05/17 2054    Specimen:  Blood Updated:  09/05/17 2105     Glucose 160 (H) mg/dL       : [email or web address] Barry BenavidezianMeter ID: [id number]       POC Glucose Fingerstick [[id number]]  (Normal) Collected:  09/06/17 0723    Specimen:  Blood Updated:  09/06/17 0734     Glucose 103 mg/dL       : [email or web address] Joya HuynhenMeter ID: [id number]       POC Glucose Fingerstick [[id number]]  (Abnormal) Collected:  09/06/17 1146    Specimen:  Blood Updated:  09/06/17 1157     Glucose 166 (H) mg/dL       : [email or web address] Annie NickMeter ID: [id number]           Xr Chest 1 View    Result Date: 8/23/2017  Narrative: History: 76-year-old evaluated for port.  Reference: Chest CT 06/01/2017.  Findings: Frontal chest radiograph performed.  Chronic dense opacities throughout the left lung with only minimally aerated lung seen. Associated volume loss. Probable chronic left pleural effusion as well. Chronic right perihilar opacities. No pneumothorax. Left Port-A-Cath, tip at cavoatrial junction.  "     Impression: Impression: Chronic appearing opacities compared to June 1 CT. Subtle change would need CT for assessment. This report was finalized on 08/23/2017 22:22 by  Mik Prater, .    Xr Chest Pa & Lateral    Result Date: 8/30/2017  Narrative: EXAMINATION: XR CHEST PA AND LATERAL- 8/30/2017 10:08 AM CDT  HISTORY: Radiation pneumonitis  COMPARISON: 08/23/2017  FINDINGS: Left subclavian Port-A-Cath remains in place with tip just inside the right atrium. There is redemonstration of near complete opacification of the left hemithorax, similar to the previous exam, with minimal aeration at the left lung apex. Chronic interstitial opacities are seen on the right with areas of scarring emanating from the right hilum. Surgical clips are seen in the region of the left hilum. The aorta is tortuous with atherosclerotic calcifications. The heart appears normal in size. There is elevation of the left hemidiaphragm, stable. No gross bony abnormality is identified.      Impression: Overall stable appearance of the chest when compared to the exam from one week prior. Near-complete opacification of the left hemithorax is unchanged and likely reflects volume loss with atelectasis. A superimposed infectious process or pleural effusion cannot be excluded. Correlate clinically. This report was finalized on 08/30/2017 10:11 by Dr. Jakob Delgado MD.    Physical Examination:        Physical Exam   Constitutional: He is oriented to person, place, and time. He appears well-developed and well-nourished.   HENT:   Head: Normocephalic and atraumatic.   Nose: Nose normal.   Mouth/Throat: Oropharynx is clear and moist.   Eyes: EOM are normal. Pupils are equal, round, and reactive to light.   Neck: Normal range of motion. Neck supple.   Cardiovascular: Normal rate, regular rhythm, normal heart sounds and intact distal pulses.    Pulmonary/Chest: Effort normal. He has decreased breath sounds.   Abdominal: Soft. Bowel sounds are normal.    Musculoskeletal: Normal range of motion.   Neurological: He is alert and oriented to person, place, and time. He has normal reflexes.   Skin: Skin is warm and dry.   Psychiatric: He has a normal mood and affect. His behavior is normal.         Assessment:        Active Problems:    Acute on chronic respiratory failure with hypoxia    Non-small cell lung cancer (NSCLC)    Radiation pneumonitis    Past Medical History:   Diagnosis Date   • Cataract Extraction    • Hyperlipidemia    • Hypertension     low since taking radiation   • Lung cancer    • Prostate cancer         Plan:        1. Acute Hypoxic Respiratory Failure  2. NSCL of LLL  3. Bilateral CAP  4. Failure to Thrive  5. DM Type II with hyper/hypoglycemia  6. Leukocytosis    Continue current treatment.  Monitor counts. Increase activity as tolerated. Continue weaning efforts. Instructed patient to notify staff if he develops any pain to his right leg and we would obtain imaging at that time.     Electronically signed by FELICITA Almodovar on 9/6/2017 at 12:53 PM

## 2017-09-06 NOTE — PROGRESS NOTES
Progress Note    Patient name: Gil Peters  Patient : 1940  VISIT # [id number]  MR #[id number]  Room: 588    Subjective  eating better, feeling better. Still weak. Participating in physical therapy. No new events    Interval History:  Mr. Gil Peters is a 76 year old  gentleman who holds a diagnosis of non-small cell lung cancer.   Gil has completed concurrent therapy with XRT and 4 cycles of carboplatin/Alimta for his locally advanced recurrent lung cancer.     He last received Cycle #2 maintenace Alimta (with 25% dose reduction) on nd has been on hold since that time due to significant fatigue.      Re-staging scans 2017 showed a consolidation RLL concerning for atelectasis vs radiation pneumonitis vs tumor progression. He was seen by pulmonary with bronchoscopy performed that was unremarkable. He was given antibiotics and steroids with marked improvement. On 17, Gil had progressive dyspnea and was arranged for home O2.     He was admitted to  8/15/17 - 17 for acute respiratory failure, requiring high flow oxygen. CT of chest 17 showed bilateral pneumonic infiltrate more severe in the [place] and left side pleural effusion.  He was treated for pneumonia with cefepime and Levaquin as well as solumedrol and IV diuretics. Radiation pneumonitis, lymphangitic carcinomatosis or progression of lung cancer however cannot be excluded.     Gil was transferred to LTAC at [facility] on 17 due to continued high flow oxygen requirements, continuing weaning efforts.  Oncology consultation is requested for continuity of care.    Diagnosis  · Stage 0- 2015   · Recurrent NSCLC-adenocarcinoma (Locally advanced)  · Molecular profile: EGFR/ALK/ROS negative  Treatment summary:  · Left lower lobe lobectomy 6/10/2015   · Carboplatin/Alimta ×4 cycles adjuvant completed 2015   · Carboplatin /Alimta x 4 cycles(2017->2017) with concurrent XRT for a  total of 6000 cGy given from 12/4/2016-02/10/2017 over 30 fractions.   · Alimta maintenance therapy every 3 weeks initiated on 4/5/2017. Cycle #2 (25% dose reduction) on 5/17/2017 and held afterwards due to poor PS and fatigue.   · June-August 2017- he received steroids for radiation pneumonitis, with marked improvement of his respiratory symptoms. Alimta maintenance on hold.  Cancer history- Locally relapsed(mediastinal) Lung Cancer-adenocarcinoma EGFR, ALK and ROS negative  The patient is a 76 years old male first seen by me on 11/18/2016 referred by Dr. Yin's office. The following are landmark events on this patient cancer history:  · Preoperative x-ray performed before a radical prostatectomy on 4/22/2014 showed a left lower lobe pulmonary infiltrate   · Referred to respiratory medicine/bronchoscopy 7/2/2014 was unrevealing.   · Repeat CT scan September 2014 compared with April 2014-no change in the infiltrate pattern.   · Repeat CT April 2015-lung nodule associated with infiltrated.   · 4/21/2015-left lower lobe final needle aspiration= well-differentiated papillary adenocarcinoma consistent with a lung primary demonstrating a lipidic growth pattern. TTF-1 positive, napsin A positive. CDX2 cytoplasmic positivity but this was not consistent with colon malignancy. PSA was negative   · 4/29/2016-PET CT scan with increased SUV uptake in the left lower lobe (SUV 7.6), mass measures 7.1 x 4.9 cm. Suspicious right hilar adenopathy.   · 6/10/2015-left lower lobe lobectomy-well-differentiated lung adenocarcinoma with predominant lepidic pattern. Margins negative. 5 peribronchial lymph nodes negative for metastatic disease. Tumor size 10.6 cm. No invasive component found. [id number] (stage 0). Molecular arkers (EGFR, ALK, ROS-1 were all negative)   · Adjuvant chemotherapy carboplatin/Alimta ×4 cycles completed October 2015   · Patient placed on surveillance follow-up with imaging  surveillance.  -------------------------------- relapse October 2016--------------------------  · 10/26/2016- PET/CT scan showing increased FDG uptake within the prevascular mediastinal lymph nodes (6.4). Hypermetabolic AP window and bilateral hilar lymph nodes (SUV 5.5). FDG activity of the left pleural thickening (SUV 5.5). Findings are consistent with recurrent disease. No evidence of disease below the diaphragm.   · 11/7/20164666-AE-tdqltn biopsy ([facility]) mediastinal node consistent with recurrent well-differentiated papillary adenocarcinoma.   · 11/18/2016- Recommended again a platinum based treatment with Carboplatin/Alimta + RT followed by Alimta maintenance   · 11/18/2016- Guardant 360 sent- no clinically significant mutation found. (MET T913m)   · 11/28/2016 MRI brain re-staging- KIRSTIN   · 3/13/2017- CT scan of abdomen and pelvis revealed a periaortic lymph node in the upper abdomen measuring 12 mm compared to 9.5 mm and a second periaortic lymph node measuring 12.5 mm compared to 7.5 mm.   · 3/13/2017- CT scan of chest revealed extensive new nodular consolidation in the left mid lung with air bronchograms concerning for neoplasm or acute pneumonitis. Suspect acute pneumonitis secondary to XRT. No measurable mediastinal lymphadenopathy.   · 4/5/2017-started on maintenance therapy with Alimta 500 mg/m². NCI toxicity, fatigue grade 2. Patient requested to delay cycle #2 for 3 weeks. Cycle 2 delivered 5/17/2016 with 25% dose reduction Alimta.   · 6/1/2017- CT scan of chest revealed volume loss of the left hemothorax with elevation of the left hemidiaphragm and shift of the mediastinum structures. Near complete consolidation of the left lung with air bronchograms with minimal aerated lung appreciated in the apex and lung base. Developing an interval increase in infiltrate of nodular opacities in the right lung, perihilar/upper and lower lobe regions. Developing irregular nodular opacity RUL. RUL nodule measuring 5 mm,  previously measured 3 mm. (Dr. Whitman spoke with radiologist related to results with differential diagnosis being radation pneumonits vs pneumonia vs lung collapse versus progression of disease). He was treated with Levofloxacin x 7 days.   · 6/1/2017- CT scan of abdomen and pelvis revealed stable appearance from 3/13/2017. Mildly prominent periaortic lymph nodes, stable. No evidence of neoplastic disease.   · 06/08/2017- He was seen by pulmonary Dr Joe Feliciano.   · 06/13/2017- Bronchoscopy was performed and did not reveal any obstructive lesion. Cytology and others BAL studies were unremarkable. He wa spescribed inhaled steroids and long B2 agonists.   · 06/26/2017- Mild improvement of his respiratory symptoms. We discussed about giving steroids for anorexia, possible radiation pneumonitis and fatigue. Continue to hold alimta maintenance due to poor performance. Prednisone prescribed 1 mg to take   · 7/28/2017- Alimta maintenance on hold. He was given steroids during the last visit and has gained about 5 pounds. His respiratory symptoms have improved markedly. Therefore, I believe that this was consistent with radiation pneumonitis. He wants to hold his Alimta until the next set of scans in September 2017.  -----------------------------------------------------------------------------------------  Cancer history #2-Prostate cancer (GS 3+4) 04/2014  · Radical prostatectomy on 4/22/2014 [id number]   · PSA <0.075 01/04/2017.  This is being followed by his urologist.       REVIEW OF SYSTEMS:   History obtained from chart review and the patient  General: positive for  - weakness  ENT: negative for - oral lesions  Allergy and Immunology: negative   Hematological and Lymphatic: positive for - slow weight gain  Respiratory: positive for - productive yellow cough. shortness of breath, improved. O2 weaned, now O2 2L via NC  Cardiovascular: negative for - chest pain or palpitations  Gastrointestinal: negative for - appetite  improved  Genito-Urinary: negative for - dysuria or hematuria  Musculoskeletal: positive for - generalized weakness  Neurological: negative for - confusion, dizziness or headaches  Dermatological: negative for pruritus and rash    Objective     O2 sat currently 93% on O2 2L/min      PHYSICAL EXAM:  General Appearance: Alert, cooperative, in no acute distress, spouse at bedside  Head: Normocephalic, without obvious abnormality, atraumatic  Eyes: Normal conjunctivae and sclerae normal, anicteric, no pallor, corneas clear  Ears: Ears appear intact with no abnormalities noted, hearing grossly normal  Throat: No oral lesions, no thrush, oral mucosa moist  Neck: No adenopathy, supple, trachea midline, no JVD  Lungs: improved air movement on left, otherwise clear to auscultation, respirations regular, even and unlabored, O2 2L/min via NC in use.  Heart: Regular rhythm and minimally tachycardic  Abdomen: Normal bowel sounds, no masses, no organomegaly, soft non-tender, non-distended, no guarding, no rebound tenderness  Genitalia: Deferred  Extremities: Moves all extremities well, no edema, no cyanosis, no redness  Skin: No bleeding, bruising or rash  Lymph nodes: No palpable adenopathy  Neurologic: Grossly intact though not formally tested    CBC    Results from last 7 days  Lab Units 09/04/17  0550 08/31/17  0530   WBC 10*3/mm3 12.47* 13.87*   HEMOGLOBIN g/dL 11.0* 11.6*   HEMATOCRIT % 35.3* 36.6*   PLATELETS 10*3/mm3 190 251   LYMPHOCYTE % %  --  2.0*   MONOCYTES % %  --  3.0*       CMP  Lab Results   Component Value Date    GLUCOSE 168 (H) 09/04/2017    BUN 21 09/04/2017    CREATININE 0.54 09/04/2017    EGFRIFNONA 148 09/04/2017    BCR 38.9 (H) 09/04/2017    CO2 27.0 09/04/2017    CALCIUM 8.6 09/04/2017    ALBUMIN 3.10 (L) 08/24/2017    LABIL2 1.2 08/24/2017    AST 44 08/24/2017    ALT 65 (H) 08/24/2017           Assessment/Plan     1.  Locally advanced recurrent NSCLC (adenocarcinoma)  -S/p LLL lobectomy 6/10/15; 4  cycles adjuvant Carbo/Alimta completed 10/2015  -recurrence via biopsy mediastinal node 11/7/16  -XRT was delivered 12/4/2016 - 02/10/2017 for a total of 6000 cGy given over 30 fractions  -4 additional cycles Carbo/Alimta completed, followed by maintenance Alimta  -Cycle #2 of maintenace Alimta (with 25% dose reduction) was last received 5/17/17, held since that time due to poor performance status.   -Continue to hold treatment.     2. Acute respiratory failure, radiation pneumonitis per pulmonology  -has been able to wean high flow O2, currently O2 2L via NC per pulmonology. O2 sat 92-98%  -off antibiotics  - Dr. Feliciano resumed Prednisone on 08/30/2017, currently Prednisone 15 mg daily.   -Recent echocardiogram LH showed a normal EF 55-60%    -CXR 8/23/17 shows chronic dense opacities throughout the left lung with only minimal aeration.  Probable chronic left pleural effusion.  Chronic right perihilar opacities. Chronic appearing opacities compared to chest CT dated 6/1/17. No pneumothorax.   - CXR 8/30/17 revealed Overall stable appearance of the chest. Near-complete opacification of the left hemithorax is unchanged and likely reflects volume loss with atelectasis. A superimposed infectious process or pleural effusion cannot be excluded     4.  Leukocytosis, improved. afebrile  - WBC  12.47 on 09/4/2017  - repeat CBC 9/7/17    5.  Deconditioning.  -continue PT  -encourage OOB  -Prednisone for steroid myopathy, per Dr. Feliciano  -CK NL at 24 on 9/5/17    Continues to slowly improve. Continue aggressive PT      Jodie Bellamy, FELICITA  09/06/17  7:03 AM

## 2017-09-06 NOTE — PROGRESS NOTES
PULMONARY AND CRITICAL CARE PROGRESS NOTE - [facility]  Patient: Gil Peters    1940   [id number]  09/06/17   8:17 AM  Referring Provider: Nicho Gould MD  Chief Complaint: respiratory failure with hypoxemia   Interval history: Patient has no new complaints.  Feels that energy is a little better.  Review of Systems:   Review of Systems   Respiratory: Positive for cough. Negative for shortness of breath and wheezing.    Cardiovascular: Negative for chest pain and palpitations.   Skin: Negative for rash.   Neurological: Positive for weakness. Negative for light-headedness.   Physical Exam:  Vital signs: bp 122/82 p 93 r 20  Sat 94/2lpm t 98.4  Physical Exam   Constitutional: He appears well-developed and well-nourished. No distress. Nasal cannula in place.   HENT:   Head: Normocephalic and atraumatic.   Nose: Nose normal.   Eyes: No scleral icterus.   Cardiovascular: Normal heart sounds.    Pulmonary/Chest: Effort normal. No respiratory distress. He has decreased breath sounds. He has no wheezes. He has no rales.   Musculoskeletal: Normal range of motion. He exhibits no edema.   Neurological: He is alert. He exhibits normal muscle tone.         Pulmonary Assessment:  1. hypoxic respiratory failure, acute on chronic, markedly better and no longer severe  2. Lung CA stable  3. Atelectasis of left lung stable  4. Consolidation of left lung stable  5. Radiation pneumonitis  6. Weakness; may be due to steroids + deconditioning + hmg-coa reductase inhibitor  Recommend:   · Continue oxygen at 2lpm  · Continue symbicort, atrovent/xopenex nebs  · Continue Prednisone at current low dose  · Continue PT/OT  Electronically signed by Joe Feliciano MD on 9/6/2017 at 8:17 AM

## 2017-09-07 LAB
ANION GAP SERPL CALCULATED.3IONS-SCNC: 8 MMOL/L (ref 4–13)
BASOPHILS # BLD AUTO: 0.01 10*3/MM3 (ref 0–0.2)
BASOPHILS NFR BLD AUTO: 0.1 % (ref 0–2)
BUN BLD-MCNC: 21 MG/DL (ref 5–21)
BUN/CREAT SERPL: 33.3 (ref 7–25)
CALCIUM SPEC-SCNC: 8.7 MG/DL (ref 8.4–10.4)
CHLORIDE SERPL-SCNC: 102 MMOL/L (ref 98–110)
CO2 SERPL-SCNC: 27 MMOL/L (ref 24–31)
CREAT BLD-MCNC: 0.63 MG/DL (ref 0.5–1.4)
DEPRECATED RDW RBC AUTO: 57.4 FL (ref 40–54)
EOSINOPHIL # BLD AUTO: 0.17 10*3/MM3 (ref 0–0.7)
EOSINOPHIL NFR BLD AUTO: 1.9 % (ref 0–4)
ERYTHROCYTE [DISTWIDTH] IN BLOOD BY AUTOMATED COUNT: 17.4 % (ref 12–15)
GFR SERPL CREATININE-BSD FRML MDRD: 123 ML/MIN/1.73
GLUCOSE BLD-MCNC: 145 MG/DL (ref 70–100)
GLUCOSE BLDC GLUCOMTR-MCNC: 116 MG/DL (ref 70–130)
GLUCOSE BLDC GLUCOMTR-MCNC: 145 MG/DL (ref 70–130)
GLUCOSE BLDC GLUCOMTR-MCNC: 151 MG/DL (ref 70–130)
GLUCOSE BLDC GLUCOMTR-MCNC: 174 MG/DL (ref 70–130)
HCT VFR BLD AUTO: 35.3 % (ref 40–52)
HGB BLD-MCNC: 11.2 G/DL (ref 14–18)
IMM GRANULOCYTES # BLD: 0.15 10*3/MM3 (ref 0–0.03)
IMM GRANULOCYTES NFR BLD: 1.7 % (ref 0–5)
LYMPHOCYTES # BLD AUTO: 0.93 10*3/MM3 (ref 0.72–4.86)
LYMPHOCYTES NFR BLD AUTO: 10.6 % (ref 15–45)
MCH RBC QN AUTO: 29.2 PG (ref 28–32)
MCHC RBC AUTO-ENTMCNC: 31.7 G/DL (ref 33–36)
MCV RBC AUTO: 92.2 FL (ref 82–95)
MONOCYTES # BLD AUTO: 0.77 10*3/MM3 (ref 0.19–1.3)
MONOCYTES NFR BLD AUTO: 8.8 % (ref 4–12)
NEUTROPHILS # BLD AUTO: 6.71 10*3/MM3 (ref 1.87–8.4)
NEUTROPHILS NFR BLD AUTO: 76.9 % (ref 39–78)
NT-PROBNP SERPL-MCNC: 1230 PG/ML (ref 0–1800)
PLATELET # BLD AUTO: 178 10*3/MM3 (ref 130–400)
PMV BLD AUTO: 10.3 FL (ref 6–12)
POTASSIUM BLD-SCNC: 4 MMOL/L (ref 3.5–5.3)
RBC # BLD AUTO: 3.83 10*6/MM3 (ref 4.8–5.9)
SODIUM BLD-SCNC: 137 MMOL/L (ref 135–145)
WBC NRBC COR # BLD: 8.74 10*3/MM3 (ref 4.8–10.8)

## 2017-09-07 PROCEDURE — 63710000001 PREDNISONE PER 5 MG: Performed by: INTERNAL MEDICINE

## 2017-09-07 PROCEDURE — 97116 GAIT TRAINING THERAPY: CPT

## 2017-09-07 PROCEDURE — 83880 ASSAY OF NATRIURETIC PEPTIDE: CPT | Performed by: NURSE PRACTITIONER

## 2017-09-07 PROCEDURE — 82962 GLUCOSE BLOOD TEST: CPT

## 2017-09-07 PROCEDURE — 97164 PT RE-EVAL EST PLAN CARE: CPT

## 2017-09-07 PROCEDURE — 63710000001 INSULIN LISPRO (HUMAN) PER 5 UNITS: Performed by: INTERNAL MEDICINE

## 2017-09-07 PROCEDURE — 63710000001 INSULIN LISPRO (HUMAN) PER 5 UNITS: Performed by: NURSE PRACTITIONER

## 2017-09-07 PROCEDURE — 85025 COMPLETE CBC W/AUTO DIFF WBC: CPT | Performed by: NURSE PRACTITIONER

## 2017-09-07 PROCEDURE — 80048 BASIC METABOLIC PNL TOTAL CA: CPT | Performed by: NURSE PRACTITIONER

## 2017-09-07 RX ORDER — PREDNISONE 10 MG/1
10 TABLET ORAL
Status: DISCONTINUED | OUTPATIENT
Start: 2017-09-07 | End: 2017-09-11 | Stop reason: HOSPADM

## 2017-09-07 RX ORDER — LEVALBUTEROL 1.25 MG/.5ML
1.25 SOLUTION, CONCENTRATE RESPIRATORY (INHALATION)
Status: DISCONTINUED | OUTPATIENT
Start: 2017-09-07 | End: 2017-09-11 | Stop reason: HOSPADM

## 2017-09-07 NOTE — PROGRESS NOTES
Adult Nutrition  Assessment/PES    Patient Name:  Gil Peters  YOB: 1940  MRN: [id number]  Admit Date:  8/23/2017    Assessment Date:  9/7/2017        Reason for Assessment       09/07/17 1507    Reason for Assessment    Reason For Assessment/Visit (P)  follow up protocol    Diagnosis (P)  Diagnosis    Cardiac (P)  HTN    Endocrine (P)  DM Type 2;Hypoglycemia    Oncology (P)  Lung cancer;Prostate cancer    Pulmonary/Critical Care (P)  Acute respiratory failure              Nutrition/Diet History       09/07/17 1508    Nutrition/Diet History    Meal/Snack Patterns (P)  3 meals per day    Reported/Observed By (P)  Patient    Appetite (P)  Good;Improved    Best Intake of (P)  Supplement   Pt states he drinks supplement with all 3 meals and his glass of milk            Anthropometrics       09/07/17 1509    Anthropometrics    RD Documented Current Weight  (P)  67.6 kg (149 lb)    Usual Body Weight (UBW)    Usual Body Weight (P)  72.6 kg (160 lb)    Weight Loss Time Frame (P)  Pt does not report any significant weight loss prior to hospitalization but did state UBW of 160    Body Mass Index (BMI)    BMI Grade (P)  19.1 - 24.9 - normal            Labs/Tests/Procedures/Meds       09/07/17 1509    Labs/Tests/Procedures/Meds    Labs/Tests Review (P)  Reviewed;Glucose;BUN;Creat;Hgb Hct    Medication Review (P)  Reviewed, pertinent    Significant Vitals (P)  reviewed            Physical Findings       09/07/17 1510    Physical Findings/Assessment    Additional Documentation (P)  Physical Appearance (Group)    Physical Appearance    Overall Physical Appearance (P)  --   Pt was sitting in chair upon visit. Looked healthy and well . Pt talked with me with ease.     Skin (P)  --   Dylan score 20/20              Nutrition Prescription Ordered       09/07/17 1510    Nutrition Prescription PO    Current PO Diet (P)  Regular    Fluid Consistency (P)  Thin    Supplement (P)  Glucerna Shake    Supplement Frequency  (P)  3 times a day    Common Modifiers (P)  Consistent Carbohydrate            Evaluation of Received Nutrient/Fluid Intake       09/07/17 1512    Evaluation of Received Nutrient/Fluid Intake    Number of Days Evaluated (P)  2 days    Nutrition Delivered (P)  Fluid Evaluation    Fluid Intake Evaluation    Other Fluid (mL) (P)  1450    Total Fluid Intake (mL) (P)  1450    PO Evaluation    Number of Days PO Intake Evaluated (P)  2 days    Number of Meals (P)  4    % PO Intake (P)  94              Problem/Interventions:        Problem 1       09/07/17 1512    Nutrition Diagnoses Problem 1    Problem 1 (P)  No Nutrition Diagnosis at this Time   Currently Pt is eating well and consuming Glucerna Shakes TID    Etiology (related to) (P)  Medical Diagnosis    Oncology (P)  Lung cancer    Signs/Symptoms (evidenced by) (P)  PO Intake    Percent (%) intake recorded (P)  94 %    Over number of meals (P)  4    Reported/Observed By (P)  Patient    Appetite (P)  Good                    Intervention Goal       09/07/17 1515    Intervention Goal    General (P)  Maintain nutrition;Meet nutritional needs for age/condition;Reduce/improve symptoms    PO (P)  Maintain intake;Meet estimated needs    Weight (P)  Maintain weight            Nutrition Intervention       09/07/17 1515    Nutrition Intervention    RD/Tech Action (P)  Follow Tx progress;Interview for preference;Encourage intake              Education/Evaluation       09/07/17 1515    Monitor/Evaluation    Monitor (P)  Per protocol;PO intake   No d/c needs noted at this time. Will continue to FU.        Comments:      Electronically signed by:  Lisset Key  09/07/17 3:17 PM

## 2017-09-07 NOTE — PROGRESS NOTES
"    Progress Note    Patient name: Gil Peters  Patient : 1940  VISIT # [id number]  MR #[id number]  Room: 588    Subjective  still weak. \"went down on right knee while walking yesterday\"    Interval History:  Mr. Gil Peters is a 76 year old  gentleman who holds a diagnosis of non-small cell lung cancer.   Gil has completed concurrent therapy with XRT and 4 cycles of carboplatin/Alimta for his locally advanced recurrent lung cancer.     He last received Cycle #2 maintenace Alimta (with 25% dose reduction) on nd has been on hold since that time due to significant fatigue.      Re-staging scans 2017 showed a consolidation RLL concerning for atelectasis vs radiation pneumonitis vs tumor progression. He was seen by pulmonary with bronchoscopy performed that was unremarkable. He was given antibiotics and steroids with marked improvement. On 17, Gil had progressive dyspnea and was arranged for home O2.     He was admitted to  8/15/17 - 17 for acute respiratory failure, requiring high flow oxygen. CT of chest 17 showed bilateral pneumonic infiltrate more severe in the [place] and left side pleural effusion.  He was treated for pneumonia with cefepime and Levaquin as well as solumedrol and IV diuretics. Radiation pneumonitis, lymphangitic carcinomatosis or progression of lung cancer however cannot be excluded.     Gil was transferred to LTAC at [facility] on 17 due to continued high flow oxygen requirements, continuing weaning efforts.  Oncology consultation is requested for continuity of care.    Diagnosis  · Stage 0- 2015   · Recurrent NSCLC-adenocarcinoma (Locally advanced)  · Molecular profile: EGFR/ALK/ROS negative  Treatment summary:  · Left lower lobe lobectomy 6/10/2015   · Carboplatin/Alimta ×4 cycles adjuvant completed 2015   · Carboplatin /Alimta x 4 cycles(2017->2017) with concurrent XRT for a total of 6000 cGy given from " 12/4/2016-02/10/2017 over 30 fractions.   · Alimta maintenance therapy every 3 weeks initiated on 4/5/2017. Cycle #2 (25% dose reduction) on 5/17/2017 and held afterwards due to poor PS and fatigue.   · June-August 2017- he received steroids for radiation pneumonitis, with marked improvement of his respiratory symptoms. Alimta maintenance on hold.  Cancer history- Locally relapsed(mediastinal) Lung Cancer-adenocarcinoma EGFR, ALK and ROS negative  The patient is a 76 years old male first seen by me on 11/18/2016 referred by Dr. Yin's office. The following are landmark events on this patient cancer history:  · Preoperative x-ray performed before a radical prostatectomy on 4/22/2014 showed a left lower lobe pulmonary infiltrate   · Referred to respiratory medicine/bronchoscopy 7/2/2014 was unrevealing.   · Repeat CT scan September 2014 compared with April 2014-no change in the infiltrate pattern.   · Repeat CT April 2015-lung nodule associated with infiltrated.   · 4/21/2015-left lower lobe final needle aspiration= well-differentiated papillary adenocarcinoma consistent with a lung primary demonstrating a lipidic growth pattern. TTF-1 positive, napsin A positive. CDX2 cytoplasmic positivity but this was not consistent with colon malignancy. PSA was negative   · 4/29/2016-PET CT scan with increased SUV uptake in the left lower lobe (SUV 7.6), mass measures 7.1 x 4.9 cm. Suspicious right hilar adenopathy.   · 6/10/2015-left lower lobe lobectomy-well-differentiated lung adenocarcinoma with predominant lepidic pattern. Margins negative. 5 peribronchial lymph nodes negative for metastatic disease. Tumor size 10.6 cm. No invasive component found. [id number] (stage 0). Molecular arkers (EGFR, ALK, ROS-1 were all negative)   · Adjuvant chemotherapy carboplatin/Alimta ×4 cycles completed October 2015   · Patient placed on surveillance follow-up with imaging surveillance.  -------------------------------- relapse October  2016--------------------------  · 10/26/2016- PET/CT scan showing increased FDG uptake within the prevascular mediastinal lymph nodes (6.4). Hypermetabolic AP window and bilateral hilar lymph nodes (SUV 5.5). FDG activity of the left pleural thickening (SUV 5.5). Findings are consistent with recurrent disease. No evidence of disease below the diaphragm.   · 11/7/20163262-[id number] biopsy ([facility]) mediastinal node consistent with recurrent well-differentiated papillary adenocarcinoma.   · 11/18/2016- Recommended again a platinum based treatment with Carboplatin/Alimta + RT followed by Alimta maintenance   · 11/18/2016- Guardant 360 sent- no clinically significant mutation found. (MET [id number])   · 11/28/2016 MRI brain re-staging- [facility]   · 3/13/2017- CT scan of abdomen and pelvis revealed a periaortic lymph node in the upper abdomen measuring 12 mm compared to 9.5 mm and a second periaortic lymph node measuring 12.5 mm compared to 7.5 mm.   · 3/13/2017- CT scan of chest revealed extensive new nodular consolidation in the left mid lung with air bronchograms concerning for neoplasm or acute pneumonitis. Suspect acute pneumonitis secondary to XRT. No measurable mediastinal lymphadenopathy.   · 4/5/2017-started on maintenance therapy with Alimta 500 mg/m². NCI toxicity, fatigue grade 2. Patient requested to delay cycle #2 for 3 weeks. Cycle 2 delivered 5/17/2016 with 25% dose reduction Alimta.   · 6/1/2017- CT scan of chest revealed volume loss of the left hemothorax with elevation of the left hemidiaphragm and shift of the mediastinum structures. Near complete consolidation of the left lung with air bronchograms with minimal aerated lung appreciated in the apex and lung base. Developing an interval increase in infiltrate of nodular opacities in the right lung, perihilar/upper and lower lobe regions. Developing irregular nodular opacity RUL. RUL nodule measuring 5 mm, previously measured 3 mm. (Dr. Whitman spoke with radiologist  related to results with differential diagnosis being radation pneumonits vs pneumonia vs lung collapse versus progression of disease). He was treated with Levofloxacin x 7 days.   · 6/1/2017- CT scan of abdomen and pelvis revealed stable appearance from 3/13/2017. Mildly prominent periaortic lymph nodes, stable. No evidence of neoplastic disease.   · 06/08/2017- He was seen by pulmonary Dr Joe Feliciano.   · 06/13/2017- Bronchoscopy was performed and did not reveal any obstructive lesion. Cytology and others BAL studies were unremarkable. He wa spescribed inhaled steroids and long B2 agonists.   · 06/26/2017- Mild improvement of his respiratory symptoms. We discussed about giving steroids for anorexia, possible radiation pneumonitis and fatigue. Continue to hold alimta maintenance due to poor performance. Prednisone prescribed 1 mg to take   · 7/28/2017- Alimta maintenance on hold. He was given steroids during the last visit and has gained about 5 pounds. His respiratory symptoms have improved markedly. Therefore, I believe that this was consistent with radiation pneumonitis. He wants to hold his Alimta until the next set of scans in September 2017.  -----------------------------------------------------------------------------------------  Cancer history #2-Prostate cancer (GS 3+4) 04/2014  · Radical prostatectomy on 4/22/2014 [id number]   · PSA <0.075 01/04/2017.  This is being followed by his urologist.       REVIEW OF SYSTEMS:   History obtained from chart review and the patient  General: positive for  - weakness  ENT: negative for - oral lesions  Allergy and Immunology: negative   Hematological and Lymphatic: positive for - slow weight gain  Respiratory: positive for - productive yellow cough. shortness of breath, improved. O2 weaned, now O2 1L via NC. Mild increase shortness of breath with O2 at 1L,   Cardiovascular: negative for - chest pain or palpitations  Gastrointestinal: negative for - appetite  improved  Genito-Urinary: negative for - dysuria or hematuria  Musculoskeletal: positive for - generalized weakness  Neurological: negative for - confusion, dizziness or headaches  Dermatological: negative for pruritus and rash    Objective     O2 sat currently 92% on O2 1L/min      PHYSICAL EXAM:  General Appearance: Alert, cooperative, spouse at bedside  Head: Normocephalic, without obvious abnormality, atraumatic  Eyes: Normal conjunctivae and sclerae normal, anicteric, no pallor, corneas clear  Ears: Ears appear intact with no abnormalities noted, hearing grossly normal  Throat: No oral lesions, no thrush, oral mucosa moist  Neck: No adenopathy, supple, trachea midline, no JVD  Lungs: improved air movement on left, otherwise clear to auscultation, respirations regular, even and unlabored, O2 1L/min via NC in use.  Heart: Regular rhythm and minimally tachycardic  Abdomen: Normal bowel sounds, no masses, no organomegaly, soft non-tender, non-distended, no guarding, no rebound tenderness  Genitalia: Deferred  Extremities: Moves all extremities well, no edema, no cyanosis, no redness  Skin: No bleeding, bruising or rash  Lymph nodes: No palpable adenopathy  Neurologic: Grossly intact though not formally tested    CBC    Results from last 7 days  Lab Units 09/07/17  0347 09/04/17  0550   WBC 10*3/mm3 8.74 12.47*   HEMOGLOBIN g/dL 11.2* 11.0*   HEMATOCRIT % 35.3* 35.3*   PLATELETS 10*3/mm3 178 190       CMP  Lab Results   Component Value Date    GLUCOSE 145 (H) 09/07/2017    BUN 21 09/07/2017    CREATININE 0.63 09/07/2017    EGFRIFNONA 123 09/07/2017    BCR 33.3 (H) 09/07/2017    CO2 27.0 09/07/2017    CALCIUM 8.7 09/07/2017    ALBUMIN 3.10 (L) 08/24/2017    LABIL2 1.2 08/24/2017    AST 44 08/24/2017    ALT 65 (H) 08/24/2017           Assessment/Plan     1.  Locally advanced recurrent NSCLC (adenocarcinoma)  -S/p LLL lobectomy 6/10/15; 4 cycles adjuvant Carbo/Alimta completed 10/2015  -recurrence via biopsy  mediastinal node 11/7/16  -XRT was delivered 12/4/2016 - 02/10/2017 for a total of 6000 cGy given over 30 fractions  -4 additional cycles Carbo/Alimta completed, followed by maintenance Alimta  -Cycle #2 of maintenace Alimta (with 25% dose reduction) was last received 5/17/17, held since that time due to poor performance status.   -Continue to hold treatment.     2. Acute respiratory failure, radiation pneumonitis per pulmonology  -has been able to wean high flow O2, currently O2 1L via NC with O2 sat 92%, . Managed per pulmonology.   -off antibiotics  - Dr. Feliciano resumed Prednisone on 08/30/2017, currently Prednisone 15 mg daily.   -Recent echocardiogram LH showed a normal EF 55-60%    -CXR 8/23/17 shows chronic dense opacities throughout the left lung with only minimal aeration.  Probable chronic left pleural effusion.  Chronic right perihilar opacities. Chronic appearing opacities compared to chest CT dated 6/1/17. No pneumothorax.   - CXR 8/30/17 revealed Overall stable appearance of the chest. Near-complete opacification of the left hemithorax is unchanged and likely reflects volume loss with atelectasis. A superimposed infectious process or pleural effusion cannot be excluded     4.  Leukocytosis, resolved.  - WBC normalized at 8.74 today.    5.  Deconditioning.  -continue PT  -encourage OOB  -on Prednisone for steroid myopathy, per Dr. Feilciano  -CK NL at 24 on 9/5/17    Continues to slowly improve. Continue PT  Considering [facility] for continued rehab next week      Jodie Bellamy, FELICITA  09/07/17  7:18 AM

## 2017-09-07 NOTE — PROGRESS NOTES
[facility]  JULIETTE Michaels M.D.  FELICITA Llamas      Internal Medicine Progress Note    9/7/2017   10:51 AM    Name:  Gil Peters  MRN:    [id number]     Acct:     [id number]   Room:  [address] [place] [address] Day: 0     Admit Date: 8/23/2017  9:04 PM  PCP: Bong Barnett MD    Subjective:     C/C: remains very weak    Interval History: Status:  Improved. Resting in bed. No family at bedside. Tolerating current treatment. Progressing with therapy. Feeling stronger.   Review of Systems   Constitution: Positive for weakness and malaise/fatigue. Negative for chills, decreased appetite, weight gain and weight loss.   HENT: Negative for congestion, ear discharge, hoarse voice and tinnitus.    Eyes: Negative for blurred vision, discharge, visual disturbance and visual halos.   Cardiovascular: Positive for dyspnea on exertion. Negative for chest pain, claudication, irregular heartbeat, leg swelling, orthopnea and paroxysmal nocturnal dyspnea.   Respiratory: Positive for shortness of breath. Negative for cough, sputum production and wheezing.    Endocrine: Negative for cold intolerance, heat intolerance and polyuria.   Hematologic/Lymphatic: Negative for adenopathy. Does not bruise/bleed easily.   Skin: Negative for dry skin, itching and suspicious lesions.   Musculoskeletal: Negative for arthritis, back pain, falls, joint pain, muscle weakness and myalgias.   Gastrointestinal: Negative for abdominal pain, constipation, diarrhea, dysphagia and hematemesis.   Genitourinary: Negative for bladder incontinence, dysuria and frequency.   Neurological: Negative for aphonia, disturbances in coordination and dizziness.   Psychiatric/Behavioral: Negative for altered mental status, depression, memory loss and substance abuse. The patient does not have insomnia and is not nervous/anxious.        Medications:     Allergies: No Known Allergies    Current Meds:   Current Facility-Administered  Medications:   •  budesonide-formoterol (SYMBICORT) 80-4.5 MCG/ACT inhaler 2 puff, 2 puff, Inhalation, BID - RT, Nicho Gould MD  •  dextrose (D50W) solution 25 g, 25 g, Intravenous, Q15 Min PRN, Nicho Gould MD  •  dextrose (GLUTOSE) oral gel 15 g, 15 g, Oral, Q15 Min PRN, Nicho Gould MD  •  docusate sodium (COLACE) capsule 100 mg, 100 mg, Oral, BID, Nicho Gould MD  •  folic acid (FOLVITE) tablet 1 mg, 1 mg, Oral, Daily, Nicho Gould MD  •  glucagon (human recombinant) (GLUCAGEN DIAGNOSTIC) injection 1 mg, 1 mg, Subcutaneous, Q15 Min PRN, Nicho Gould MD  •  insulin lispro (humaLOG) injection 0-14 Units, 0-14 Units, Subcutaneous, 4x Daily With Meals & Nightly, Nicho Gould MD  •  insulin lispro (humaLOG) injection 5 Units, 5 Units, Subcutaneous, TID With Meals, FELICITA Almodovar  •  ipratropium (ATROVENT) nebulizer solution 0.5 mg, 0.5 mg, Nebulization, Q4H - RT, Nicho Gould MD  •  levalbuterol (XOPENEX) nebulizer solution 1.25 mg, 1.25 mg, Nebulization, Q4H - RT, Joe Feliciano MD  •  megestrol (MEGACE) 40 MG/ML suspension 40 mg, 40 mg, Oral, Daily, Nicho Gould MD  •  polyethylene glycol (MIRALAX) packet 17 g, 17 g, Oral, Q24H, Nicho Gould MD  •  predniSONE (DELTASONE) tablet 10 mg, 10 mg, Oral, Daily With Breakfast, Nicho Gould MD  •  predniSONE (DELTASONE) tablet 40 mg, 40 mg, Oral, Once, Joe Feliciano MD    Data:     Code Status:  No Order    Family History   Problem Relation Age of Onset   • Cancer Mother      ovarian   • Cancer Father      prostate    • Melanoma Child        Social History     Social History   • Marital status:      Spouse name: N/A   • Number of children: N/A   • Years of education: N/A     Occupational History   • Retired - [employer]      Social History Main Topics   • Smoking status: Former Smoker     Types: Cigarettes   • Smokeless tobacco: Never Used       "Comment: QUIT 41 YRS. AGO   • Alcohol use No   • Drug use: No   • Sexual activity: Defer     Other Topics Concern   • Not on file     Social History Narrative       Vitals:  Temp 97.5, P 95, R 18, BP 95/65, O2 93% (1lpm)  Ht 67.99\" (172.7 cm)  Wt 149 lb 12.8 oz (67.9 kg)  BMI 22.78 kg/m2  No data recorded.    I/O (24Hr):  No intake or output data in the 24 hours ending 09/07/17 1051    Labs and imaging:      Lab Results (last 24 hours)     Procedure Component Value Units Date/Time    POC Glucose Fingerstick [[id number]]  (Abnormal) Collected:  09/06/17 1146    Specimen:  Blood Updated:  09/06/17 1157     Glucose 166 (H) mg/dL       : SUZAN Valencia NickMeter ID: [id number]       POC Glucose Fingerstick [[id number]]  (Abnormal) Collected:  09/06/17 1718    Specimen:  Blood Updated:  09/06/17 1745     Glucose 172 (H) mg/dL       : SUZAN Valencia NickMeter ID: [id number]       POC Glucose Fingerstick [[id number]]  (Abnormal) Collected:  09/06/17 2105    Specimen:  Blood Updated:  09/06/17 2116     Glucose 202 (H) mg/dL       : CDAVIDARRELL BenavidezianMeter ID: [id number]       CBC & Differential [[id number]] Collected:  09/07/17 0347    Specimen:  Blood Updated:  09/07/17 0459    Narrative:       The following orders were created for panel order CBC & Differential.  Procedure                               Abnormality         Status                     ---------                               -----------         ------                     CBC Auto Differential[[id number]]        Abnormal            Final result                 Please view results for these tests on the individual orders.    CBC Auto Differential [[id number]]  (Abnormal) Collected:  09/07/17 0347    Specimen:  Blood Updated:  09/07/17 0459     WBC 8.74 10*3/mm3      RBC 3.83 (L) 10*6/mm3      Hemoglobin 11.2 (L) g/dL      Hematocrit 35.3 (L) %      MCV 92.2 fL      MCH 29.2 pg      MCHC 31.7 (L) g/dL      RDW 17.4 (H) %      " RDW-SD 57.4 (H) fl      MPV 10.3 fL      Platelets 178 10*3/mm3      Neutrophil % 76.9 %      Lymphocyte % 10.6 (L) %      Monocyte % 8.8 %      Eosinophil % 1.9 %      Basophil % 0.1 %      Immature Grans % 1.7 %      Neutrophils, Absolute 6.71 10*3/mm3      Lymphocytes, Absolute 0.93 10*3/mm3      Monocytes, Absolute 0.77 10*3/mm3      Eosinophils, Absolute 0.17 10*3/mm3      Basophils, Absolute 0.01 10*3/mm3      Immature Grans, Absolute 0.15 (H) 10*3/mm3     Basic Metabolic Panel [[id number]]  (Abnormal) Collected:  09/07/17 0347    Specimen:  Blood Updated:  09/07/17 0506     Glucose 145 (H) mg/dL      BUN 21 mg/dL      Creatinine 0.63 mg/dL      Sodium 137 mmol/L      Potassium 4.0 mmol/L      Chloride 102 mmol/L      CO2 27.0 mmol/L      Calcium 8.7 mg/dL      eGFR Non African Amer 123 mL/min/1.73      BUN/Creatinine Ratio 33.3 (H)     Anion Gap 8.0 mmol/L     Narrative:       The MDRD GFR formula is only valid for adults with stable renal function between ages 18 and 70.    BNP [[id number]]  (Normal) Collected:  09/07/17 0347    Specimen:  Blood Updated:  09/07/17 0515     proBNP 1230.0 pg/mL     POC Glucose Fingerstick [[id number]]  (Normal) Collected:  09/07/17 0752    Specimen:  Blood Updated:  09/07/17 0804     Glucose 116 mg/dL       : BOO HerreraMetjulio ID: [id number]           Xr Chest 1 View    Result Date: 8/23/2017  Narrative: History: 76-year-old evaluated for port.  Reference: Chest CT 06/01/2017.  Findings: Frontal chest radiograph performed.  Chronic dense opacities throughout the left lung with only minimally aerated lung seen. Associated volume loss. Probable chronic left pleural effusion as well. Chronic right perihilar opacities. No pneumothorax. Left Port-A-Cath, tip at cavoatrial junction.      Impression: Impression: Chronic appearing opacities compared to June 1 CT. Subtle change would need CT for assessment. This report was finalized on 08/23/2017 22:22 by  Mik Prater,  .    Xr Chest Pa & Lateral    Result Date: 8/30/2017  Narrative: EXAMINATION: XR CHEST PA AND LATERAL- 8/30/2017 10:08 AM CDT  HISTORY: Radiation pneumonitis  COMPARISON: 08/23/2017  FINDINGS: Left subclavian Port-A-Cath remains in place with tip just inside the right atrium. There is redemonstration of near complete opacification of the left hemithorax, similar to the previous exam, with minimal aeration at the left lung apex. Chronic interstitial opacities are seen on the right with areas of scarring emanating from the right hilum. Surgical clips are seen in the region of the left hilum. The aorta is tortuous with atherosclerotic calcifications. The heart appears normal in size. There is elevation of the left hemidiaphragm, stable. No gross bony abnormality is identified.      Impression: Overall stable appearance of the chest when compared to the exam from one week prior. Near-complete opacification of the left hemithorax is unchanged and likely reflects volume loss with atelectasis. A superimposed infectious process or pleural effusion cannot be excluded. Correlate clinically. This report was finalized on 08/30/2017 10:11 by Dr. Jakob Delgado MD.    Physical Examination:        Physical Exam   Constitutional: He is oriented to person, place, and time. He appears well-developed and well-nourished.   HENT:   Head: Normocephalic and atraumatic.   Nose: Nose normal.   Mouth/Throat: Oropharynx is clear and moist.   Eyes: EOM are normal. Pupils are equal, round, and reactive to light.   Neck: Normal range of motion. Neck supple.   Cardiovascular: Normal rate, regular rhythm, normal heart sounds and intact distal pulses.    Pulmonary/Chest: Effort normal. He has decreased breath sounds.   Abdominal: Soft. Bowel sounds are normal.   Musculoskeletal: Normal range of motion.   Neurological: He is alert and oriented to person, place, and time. He has normal reflexes.   Skin: Skin is warm and dry.   Psychiatric: He has a  normal mood and affect. His behavior is normal.         Assessment:        Active Problems:    Acute on chronic respiratory failure with hypoxia    Non-small cell lung cancer (NSCLC)    Radiation pneumonitis    Past Medical History:   Diagnosis Date   • Cataract Extraction    • Hyperlipidemia    • Hypertension     low since taking radiation   • Lung cancer    • Prostate cancer         Plan:        1. Acute Hypoxic Respiratory Failure  2. NSCL of LLL  3. Bilateral CAP  4. Failure to Thrive  5. DM Type II with hyper/hypoglycemia  6. Leukocytosis    Continue current treatment.  Monitor counts. Increase activity as tolerated. Continue weaning efforts.   Electronically signed by FELICITA Almodovar on 9/7/2017 at 10:51 AM

## 2017-09-07 NOTE — PROGRESS NOTES
PULMONARY AND CRITICAL CARE PROGRESS NOTE - [facility]  Patient: Gil Peters    1940   [id number]  09/07/17   8:51 AM  Referring Provider: Nicho Gould MD  Chief Complaint: Respiratory failure with hypoxemia   Interval history: Patient eating this morning on 1L with a saturation of 91%. Feels less SOA when increased to 2L. Per the wife they are planning to discharge him to a rehab facility on Monday and they feel that he is still too weak to be cared for at home. No new issues have developed. No other aggravating, alleviating factors or associated symptoms.  Review of Systems:   Review of Systems   Respiratory: Positive for cough. Negative for shortness of breath and wheezing.    Cardiovascular: Negative for chest pain and palpitations.   Skin: Negative for rash.   Neurological: Positive for weakness. Negative for light-headedness.   Physical Exam:  Vital signs: bp 95/68 p 106 r 22  Sat 91 on 1L t 98.1  Physical Exam   Constitutional: He appears well-developed and well-nourished. No distress. Nasal cannula in place.   HENT:   Head: Normocephalic and atraumatic.   Nose: Nose normal.   Eyes: No scleral icterus.   Cardiovascular: Normal heart sounds.    Pulmonary/Chest: Effort normal. No respiratory distress. He has decreased breath sounds. He has no wheezes. He has no rales.   Musculoskeletal: Normal range of motion. He exhibits no edema.   Neurological: He is alert. He exhibits normal muscle tone.         Pulmonary Assessment:  1. hypoxic respiratory failure, acute on chronic, markedly better and no longer severe  2. Lung CA stable  3. Atelectasis of left lung stable  4. Consolidation of left lung stable  5. Radiation pneumonitis  6. Weakness; may be due to steroids + deconditioning + hmg-coa reductase inhibitor  Recommend:   · Continue oxygen at 2lpm  · Continue symbicort, atrovent/xopenex nebs  · Decrease prednisone to 10mg daily. If unable to tolerate this dose then  will give 10mg daily alternating with 15mg daily.   · Continue PT/OT  · Decrease neb frequency  · Anticipating discharge to a rehab facility on Monday for continued PT efforts as he remains too weak to be cared for at home at this point in time  Electronically signed by FELICITA Li on 9/7/2017 at 8:51 AM   Physician substantive contribution:  Pertinent symptoms/interval history include: feels about the same. Says he will be going to [facility] next week; cough is still present  Respiratory exam shows pertinent findings of:diminished breath sounds.  Plan includes: try to reduce predinisone to 10 mg per day for now, increase if cough worsens between now and date of discharge;  Anticipate prolonged need for steroids for radiation pneumonitis.  I have seen and examined patient personally, performing a face-to-face diagnostic evaluation with plan of care reviewed and developed with APRN and nursing staff. I have addended and/or modified the above history of present illness, physical examination, and assessment and plan to reflect my findings and impressions. Essential elements of the care plan were discussed with APRN above.  Agree with findings and assessment/plan as documented above.    Electronically signed by Joe Feliciano MD, on 9/7/2017, 9:27 AM

## 2017-09-08 LAB
GLUCOSE BLDC GLUCOMTR-MCNC: 127 MG/DL (ref 70–130)
GLUCOSE BLDC GLUCOMTR-MCNC: 142 MG/DL (ref 70–130)
GLUCOSE BLDC GLUCOMTR-MCNC: 210 MG/DL (ref 70–130)
GLUCOSE BLDC GLUCOMTR-MCNC: 238 MG/DL (ref 70–130)

## 2017-09-08 PROCEDURE — 97530 THERAPEUTIC ACTIVITIES: CPT

## 2017-09-08 PROCEDURE — 97116 GAIT TRAINING THERAPY: CPT

## 2017-09-08 PROCEDURE — 82962 GLUCOSE BLOOD TEST: CPT

## 2017-09-08 PROCEDURE — 63710000001 INSULIN LISPRO (HUMAN) PER 5 UNITS: Performed by: NURSE PRACTITIONER

## 2017-09-08 PROCEDURE — 63710000001 PREDNISONE PER 5 MG: Performed by: INTERNAL MEDICINE

## 2017-09-08 NOTE — PROGRESS NOTES
[facility]  JULIETTE Michaels M.D.  FELICITA Llamas      Internal Medicine Progress Note    9/8/2017   11:08 AM    Name:  Gil Peters  MRN:    [id number]     Acct:     [id number]   Room:  [address] [place] [address] Day: 0     Admit Date: 8/23/2017  9:04 PM  PCP: Bong Barnett MD    Subjective:     C/C: remains very weak    Interval History: Status:  Improved. Resting in bed. No family at bedside. Tolerating current treatment. Progressing with therapy. Feeling stronger. Plans to discharge to [facility] on Monday for continued rehabilitation and strengthening.   Review of Systems   Constitution: Positive for weakness and malaise/fatigue. Negative for chills, decreased appetite, weight gain and weight loss.   HENT: Negative for congestion, ear discharge, hoarse voice and tinnitus.    Eyes: Negative for blurred vision, discharge, visual disturbance and visual halos.   Cardiovascular: Positive for dyspnea on exertion. Negative for chest pain, claudication, irregular heartbeat, leg swelling, orthopnea and paroxysmal nocturnal dyspnea.   Respiratory: Positive for shortness of breath. Negative for cough, sputum production and wheezing.    Endocrine: Negative for cold intolerance, heat intolerance and polyuria.   Hematologic/Lymphatic: Negative for adenopathy. Does not bruise/bleed easily.   Skin: Negative for dry skin, itching and suspicious lesions.   Musculoskeletal: Negative for arthritis, back pain, falls, joint pain, muscle weakness and myalgias.   Gastrointestinal: Negative for abdominal pain, constipation, diarrhea, dysphagia and hematemesis.   Genitourinary: Negative for bladder incontinence, dysuria and frequency.   Neurological: Negative for aphonia, disturbances in coordination and dizziness.   Psychiatric/Behavioral: Negative for altered mental status, depression, memory loss and substance abuse. The patient does not have insomnia and is not nervous/anxious.        Medications:      Allergies: No Known Allergies    Current Meds:   Current Facility-Administered Medications:   •  budesonide-formoterol (SYMBICORT) 80-4.5 MCG/ACT inhaler 2 puff, 2 puff, Inhalation, BID - RT, Nicho Gould MD  •  dextrose (D50W) solution 25 g, 25 g, Intravenous, Q15 Min PRN, Nicho Gould MD  •  dextrose (GLUTOSE) oral gel 15 g, 15 g, Oral, Q15 Min PRN, Nicho Gould MD  •  docusate sodium (COLACE) capsule 100 mg, 100 mg, Oral, BID, Nicho Gould MD  •  folic acid (FOLVITE) tablet 1 mg, 1 mg, Oral, Daily, Nicho Gould MD  •  glucagon (human recombinant) (GLUCAGEN DIAGNOSTIC) injection 1 mg, 1 mg, Subcutaneous, Q15 Min PRN, Nicho Gould MD  •  insulin lispro (humaLOG) injection 0-14 Units, 0-14 Units, Subcutaneous, 4x Daily With Meals & Nightly, Nicho Gould MD  •  insulin lispro (humaLOG) injection 5 Units, 5 Units, Subcutaneous, TID With Meals, FELICITA Almodovar  •  ipratropium (ATROVENT) nebulizer solution 0.5 mg, 0.5 mg, Nebulization, Q6H - RT, Joe Feliciano MD  •  levalbuterol (XOPENEX) nebulizer solution 1.25 mg, 1.25 mg, Nebulization, Q6H - RT, Joe Feliciano MD  •  megestrol (MEGACE) 40 MG/ML suspension 40 mg, 40 mg, Oral, Daily, Nicho Gould MD  •  polyethylene glycol (MIRALAX) packet 17 g, 17 g, Oral, Q24H, Nicho Gould MD  •  predniSONE (DELTASONE) tablet 10 mg, 10 mg, Oral, Daily With Breakfast, Nicho Gould MD  •  predniSONE (DELTASONE) tablet 40 mg, 40 mg, Oral, Once, Joe Feliciano MD    Data:     Code Status:  No Order    Family History   Problem Relation Age of Onset   • Cancer Mother      ovarian   • Cancer Father      prostate    • Melanoma Child        Social History     Social History   • Marital status:      Spouse name: N/A   • Number of children: N/A   • Years of education: N/A     Occupational History   • Retired - [employer]      Social History Main Topics   • Smoking  "status: Former Smoker     Types: Cigarettes   • Smokeless tobacco: Never Used      Comment: QUIT 41 YRS. AGO   • Alcohol use No   • Drug use: No   • Sexual activity: Defer     Other Topics Concern   • Not on file     Social History Narrative       Vitals:  Temp 98.1, P 86, R 20, /63, O2 96% (2lpm)  Ht 67.99\" (172.7 cm)  Wt 149 lb 12.8 oz (67.9 kg)  BMI 22.78 kg/m2  No data recorded.    I/O (24Hr):  No intake or output data in the 24 hours ending 09/08/17 1108    Labs and imaging:      Lab Results (last 24 hours)     Procedure Component Value Units Date/Time    POC Glucose Fingerstick [[id number]]  (Abnormal) Collected:  09/07/17 1201    Specimen:  Blood Updated:  09/07/17 1217     Glucose 145 (H) mg/dL       : BOO AndersonuvinaMeter ID: [id number]       POC Glucose Fingerstick [[id number]]  (Abnormal) Collected:  09/07/17 1711    Specimen:  Blood Updated:  09/07/17 1816     Glucose 174 (H) mg/dL       : BOO Rooney LouvinaMeter ID: [id number]       POC Glucose Fingerstick [[id number]]  (Abnormal) Collected:  09/07/17 2051    Specimen:  Blood Updated:  09/07/17 2107     Glucose 151 (H) mg/dL       : MICHAEL Jacobson GMeter ID: [id number]       POC Glucose Fingerstick [[id number]]  (Abnormal) Collected:  09/08/17 0536    Specimen:  Blood Updated:  09/08/17 0547     Glucose 142 (H) mg/dL       : TMTAMELA Warren TimMeter ID: [id number]           Xr Chest 1 View    Result Date: 8/23/2017  Narrative: History: 76-year-old evaluated for port.  Reference: Chest CT 06/01/2017.  Findings: Frontal chest radiograph performed.  Chronic dense opacities throughout the left lung with only minimally aerated lung seen. Associated volume loss. Probable chronic left pleural effusion as well. Chronic right perihilar opacities. No pneumothorax. Left Port-A-Cath, tip at cavoatrial junction.      Impression: Impression: Chronic appearing opacities compared to June 1 CT. Subtle change " would need CT for assessment. This report was finalized on 08/23/2017 22:22 by  Mik Prater, .    Xr Chest Pa & Lateral    Result Date: 8/30/2017  Narrative: EXAMINATION: XR CHEST PA AND LATERAL- 8/30/2017 10:08 AM CDT  HISTORY: Radiation pneumonitis  COMPARISON: 08/23/2017  FINDINGS: Left subclavian Port-A-Cath remains in place with tip just inside the right atrium. There is redemonstration of near complete opacification of the left hemithorax, similar to the previous exam, with minimal aeration at the left lung apex. Chronic interstitial opacities are seen on the right with areas of scarring emanating from the right hilum. Surgical clips are seen in the region of the left hilum. The aorta is tortuous with atherosclerotic calcifications. The heart appears normal in size. There is elevation of the left hemidiaphragm, stable. No gross bony abnormality is identified.      Impression: Overall stable appearance of the chest when compared to the exam from one week prior. Near-complete opacification of the left hemithorax is unchanged and likely reflects volume loss with atelectasis. A superimposed infectious process or pleural effusion cannot be excluded. Correlate clinically. This report was finalized on 08/30/2017 10:11 by Dr. Jakob Delgado MD.    Physical Examination:        Physical Exam   Constitutional: He is oriented to person, place, and time. He appears well-developed and well-nourished.   HENT:   Head: Normocephalic and atraumatic.   Nose: Nose normal.   Mouth/Throat: Oropharynx is clear and moist.   Eyes: EOM are normal. Pupils are equal, round, and reactive to light.   Neck: Normal range of motion. Neck supple.   Cardiovascular: Normal rate, regular rhythm, normal heart sounds and intact distal pulses.    Pulmonary/Chest: Effort normal. He has decreased breath sounds.   Abdominal: Soft. Bowel sounds are normal.   Musculoskeletal: Normal range of motion.   Neurological: He is alert and oriented to person,  place, and time. He has normal reflexes.   Skin: Skin is warm and dry.   Psychiatric: He has a normal mood and affect. His behavior is normal.         Assessment:        Active Problems:    Acute on chronic respiratory failure with hypoxia    Non-small cell lung cancer (NSCLC)    Radiation pneumonitis    Past Medical History:   Diagnosis Date   • Cataract Extraction    • Hyperlipidemia    • Hypertension     low since taking radiation   • Lung cancer    • Prostate cancer         Plan:        1. Acute Hypoxic Respiratory Failure  2. NSCL of LLL  3. Bilateral CAP  4. Failure to Thrive  5. DM Type II with hyper/hypoglycemia  6. Leukocytosis    Continue current treatment.  Monitor counts. Increase activity as tolerated. Continue weaning efforts. Plan discharge to SNF Monday.   Electronically signed by FELICITA Almodovar on 9/8/2017 at 11:08 AM

## 2017-09-08 NOTE — PROGRESS NOTES
PULMONARY AND CRITICAL CARE PROGRESS NOTE - [facility]  Patient: Gil Peters    1940   [id number]  09/08/17   8:50 AM  Referring Provider: Nicho Gould MD  Chief Complaint: Respiratory failure with hypoxemia   Interval history: Patient breathing comfortably on 2L with a saturation of 94%. Tolerating lower prednisone dose without event. Remains weak. Plans to discharge Monday to a rehab facility. No new issues overnight. Wife at the bedside this morning. No other aggravating, alleviating factors or associated symptoms.  Review of Systems:   Review of Systems   Respiratory: Positive for cough. Negative for shortness of breath and wheezing.    Cardiovascular: Negative for chest pain and palpitations.   Skin: Negative for rash.   Neurological: Positive for weakness. Negative for light-headedness.   Physical Exam:  Vital signs: bp 108/63 p 94 r 18  Sat 94 on 2L t 98.1  Physical Exam   Constitutional: He appears well-developed and well-nourished. No distress. Nasal cannula in place.   HENT:   Head: Normocephalic and atraumatic.   Nose: Nose normal.   Eyes: No scleral icterus.   Cardiovascular: Normal heart sounds.    Pulmonary/Chest: Effort normal. No respiratory distress. He has decreased breath sounds. He has no wheezes. He has no rales.   Musculoskeletal: Normal range of motion. He exhibits no edema.   Neurological: He is alert. He exhibits normal muscle tone.         Pulmonary Assessment:  1. hypoxic respiratory failure, acute on chronic, markedly better and no longer severe  2. Lung CA stable  3. Atelectasis of left lung stable  4. Consolidation of left lung stable  5. Radiation pneumonitis  6. Weakness; may be due to steroids + deconditioning + hmg-coa reductase inhibitor  Recommend:   · Continue oxygen at 2lpm  · Continue symbicort, atrovent/xopenex nebs  · Continue prednisone 10mg daily until discharge. Upon discharge will change to every other day x 5 doses then  discontinue.    · Continue PT/OT  · Continue bronchodilators.   · Will follow back up on Monday. Call if there are any issues over the weekend.   · Anticipating discharge to a rehab facility on Monday for continued PT efforts as he remains too weak to be cared for at home at this point in time  Electronically signed by FELICITA Li on 9/8/2017 at 8:50 AM   Physician substantive contribution:  Pertinent symptoms/interval history include: feels better, cough still present but better  Respiratory exam shows pertinent findings of:clear breath sounds.  Plan includes: continue 10 mg per day prednisone, I will recheck Monday.  I have seen and examined patient personally, performing a face-to-face diagnostic evaluation with plan of care reviewed and developed with APRN and nursing staff. I have addended and/or modified the above history of present illness, physical examination, and assessment and plan to reflect my findings and impressions. Essential elements of the care plan were discussed with APRN above.  Agree with findings and assessment/plan as documented above.    Electronically signed by Joe Feliciano MD, on 9/8/2017, 9:08 AM

## 2017-09-08 NOTE — PROGRESS NOTES
Progress Note    Patient name: Gil Peters  Patient : 1940  VISIT # [id number]  MR #[id number]  Room: 588    Subjective  Breathing better with O2 at 2L NC. Walked to end of olsen with PT     Interval History:  Mr. Gil Peters is a 76 year old  gentleman who holds a diagnosis of non-small cell lung cancer.   Gil has completed concurrent therapy with XRT and 4 cycles of carboplatin/Alimta for his locally advanced recurrent lung cancer.     He last received Cycle #2 maintenace Alimta (with 25% dose reduction) on nd has been on hold since that time due to significant fatigue.      Re-staging scans 2017 showed a consolidation RLL concerning for atelectasis vs radiation pneumonitis vs tumor progression. He was seen by pulmonary with bronchoscopy performed that was unremarkable. He was given antibiotics and steroids with marked improvement. On 17, Gil had progressive dyspnea and was arranged for home O2.     He was admitted to  8/15/17 - 17 for acute respiratory failure, requiring high flow oxygen. CT of chest 17 showed bilateral pneumonic infiltrate more severe in the [place] and left side pleural effusion.  He was treated for pneumonia with cefepime and Levaquin as well as solumedrol and IV diuretics. Radiation pneumonitis, lymphangitic carcinomatosis or progression of lung cancer however cannot be excluded.     Gil was transferred to LTAC at [facility] on 17 due to continued high flow oxygen requirements, continuing weaning efforts.  Oncology is following for continuity of care.    Diagnosis  · Stage 0- 2015   · Recurrent NSCLC-adenocarcinoma (Locally advanced)  · Molecular profile: EGFR/ALK/ROS negative  Treatment summary:  · Left lower lobe lobectomy 6/10/2015   · Carboplatin/Alimta ×4 cycles adjuvant completed 2015   · Carboplatin /Alimta x 4 cycles(2017->2017) with concurrent XRT for a total of 6000 cGy given from  12/4/2016-02/10/2017 over 30 fractions.   · Alimta maintenance therapy every 3 weeks initiated on 4/5/2017. Cycle #2 (25% dose reduction) on 5/17/2017 and held afterwards due to poor PS and fatigue.   · June-August 2017- he received steroids for radiation pneumonitis, with marked improvement of his respiratory symptoms. Alimta maintenance on hold.  Cancer history- Locally relapsed(mediastinal) Lung Cancer-adenocarcinoma EGFR, ALK and ROS negative  The patient is a 76 years old male first seen by me on 11/18/2016 referred by Dr. Yin's office. The following are landmark events on this patient cancer history:  · Preoperative x-ray performed before a radical prostatectomy on 4/22/2014 showed a left lower lobe pulmonary infiltrate   · Referred to respiratory medicine/bronchoscopy 7/2/2014 was unrevealing.   · Repeat CT scan September 2014 compared with April 2014-no change in the infiltrate pattern.   · Repeat CT April 2015-lung nodule associated with infiltrated.   · 4/21/2015-left lower lobe final needle aspiration= well-differentiated papillary adenocarcinoma consistent with a lung primary demonstrating a lipidic growth pattern. TTF-1 positive, napsin A positive. CDX2 cytoplasmic positivity but this was not consistent with colon malignancy. PSA was negative   · 4/29/2016-PET CT scan with increased SUV uptake in the left lower lobe (SUV 7.6), mass measures 7.1 x 4.9 cm. Suspicious right hilar adenopathy.   · 6/10/2015-left lower lobe lobectomy-well-differentiated lung adenocarcinoma with predominant lepidic pattern. Margins negative. 5 peribronchial lymph nodes negative for metastatic disease. Tumor size 10.6 cm. No invasive component found. [id number] (stage 0). Molecular arkers (EGFR, ALK, ROS-1 were all negative)   · Adjuvant chemotherapy carboplatin/Alimta ×4 cycles completed October 2015   · Patient placed on surveillance follow-up with imaging surveillance.  -------------------------------- relapse October  2016--------------------------  · 10/26/2016- PET/CT scan showing increased FDG uptake within the prevascular mediastinal lymph nodes (6.4). Hypermetabolic AP window and bilateral hilar lymph nodes (SUV 5.5). FDG activity of the left pleural thickening (SUV 5.5). Findings are consistent with recurrent disease. No evidence of disease below the diaphragm.   · 11/7/20165168-CD-jwfbsk biopsy ([facility]) mediastinal node consistent with recurrent well-differentiated papillary adenocarcinoma.   · 11/18/2016- Recommended again a platinum based treatment with Carboplatin/Alimta + RT followed by Alimta maintenance   · 11/18/2016- Guardant 360 sent- no clinically significant mutation found. (MET T913m)   · 11/28/2016 MRI brain re-staging- [facility]IN   · 3/13/2017- CT scan of abdomen and pelvis revealed a periaortic lymph node in the upper abdomen measuring 12 mm compared to 9.5 mm and a second periaortic lymph node measuring 12.5 mm compared to 7.5 mm.   · 3/13/2017- CT scan of chest revealed extensive new nodular consolidation in the left mid lung with air bronchograms concerning for neoplasm or acute pneumonitis. Suspect acute pneumonitis secondary to XRT. No measurable mediastinal lymphadenopathy.   · 4/5/2017-started on maintenance therapy with Alimta 500 mg/m². NCI toxicity, fatigue grade 2. Patient requested to delay cycle #2 for 3 weeks. Cycle 2 delivered 5/17/2016 with 25% dose reduction Alimta.   · 6/1/2017- CT scan of chest revealed volume loss of the left hemothorax with elevation of the left hemidiaphragm and shift of the mediastinum structures. Near complete consolidation of the left lung with air bronchograms with minimal aerated lung appreciated in the apex and lung base. Developing an interval increase in infiltrate of nodular opacities in the right lung, perihilar/upper and lower lobe regions. Developing irregular nodular opacity RUL. RUL nodule measuring 5 mm, previously measured 3 mm. (Dr. Whitman spoke with radiologist  related to results with differential diagnosis being radation pneumonits vs pneumonia vs lung collapse versus progression of disease). He was treated with Levofloxacin x 7 days.   · 6/1/2017- CT scan of abdomen and pelvis revealed stable appearance from 3/13/2017. Mildly prominent periaortic lymph nodes, stable. No evidence of neoplastic disease.   · 06/08/2017- He was seen by pulmonary Dr Joe Feliciano.   · 06/13/2017- Bronchoscopy was performed and did not reveal any obstructive lesion. Cytology and others BAL studies were unremarkable. He wa spescribed inhaled steroids and long B2 agonists.   · 06/26/2017- Mild improvement of his respiratory symptoms. We discussed about giving steroids for anorexia, possible radiation pneumonitis and fatigue. Continue to hold alimta maintenance due to poor performance. Prednisone prescribed 1 mg to take   · 7/28/2017- Alimta maintenance on hold. He was given steroids during the last visit and has gained about 5 pounds. His respiratory symptoms have improved markedly. Therefore, I believe that this was consistent with radiation pneumonitis. He wants to hold his Alimta until the next set of scans in September 2017.  -----------------------------------------------------------------------------------------  Cancer history #2-Prostate cancer (GS 3+4) 04/2014  · Radical prostatectomy on 4/22/2014 [id number]   · PSA <0.075 01/04/2017.  This is being followed by his urologist.       REVIEW OF SYSTEMS:   History obtained from chart review and the patient  General: positive for  - weakness, slowly improving  ENT: negative for - oral lesions  Allergy and Immunology: negative   Hematological and Lymphatic: positive for - slow weight gain  Respiratory: positive for - shortness of breath, improved. O2 weaned, now O2 2L via NC. O2 sat 94%  Cardiovascular: negative for - chest pain or palpitations  Gastrointestinal: negative for - appetite improved  Genito-Urinary: negative for - dysuria or  hematuria  Musculoskeletal: positive for - generalized weakness  Neurological: negative for - confusion, dizziness or headaches  Dermatological: negative for pruritus and rash    Objective     O2 sat currently 94% on O2 2L/min via NC     PHYSICAL EXAM:  General Appearance: Alert, cooperative, spouse at bedside  Head: Normocephalic, without obvious abnormality, atraumatic  Eyes: Normal conjunctivae and sclerae normal, anicteric, no pallor, corneas clear  Ears: Ears appear intact with no abnormalities noted, hearing grossly normal  Throat: No oral lesions, no thrush, oral mucosa moist  Neck: No adenopathy, supple, trachea midline, no JVD  Lungs: diminished on left, otherwise clear to auscultation, respirations regular, even and unlabored, O2 2L/min via NC in use.  Heart: Regular rhythm and minimally tachycardic at 102  Abdomen: Normal bowel sounds, no masses, no organomegaly, soft non-tender, non-distended, no guarding, no rebound tenderness  Genitalia: Deferred  Extremities: Moves all extremities well, no edema, no cyanosis, no redness  Skin: No bleeding, bruising or rash  Lymph nodes: No palpable adenopathy  Neurologic: Grossly intact though not formally tested    CBC    Results from last 7 days  Lab Units 09/07/17  0347 09/04/17  0550   WBC 10*3/mm3 8.74 12.47*   HEMOGLOBIN g/dL 11.2* 11.0*   HEMATOCRIT % 35.3* 35.3*   PLATELETS 10*3/mm3 178 190       CMP  Lab Results   Component Value Date    GLUCOSE 145 (H) 09/07/2017    BUN 21 09/07/2017    CREATININE 0.63 09/07/2017    EGFRIFNONA 123 09/07/2017    BCR 33.3 (H) 09/07/2017    CO2 27.0 09/07/2017    CALCIUM 8.7 09/07/2017    ALBUMIN 3.10 (L) 08/24/2017    LABIL2 1.2 08/24/2017    AST 44 08/24/2017    ALT 65 (H) 08/24/2017           Assessment/Plan     1.  Locally advanced recurrent NSCLC (adenocarcinoma)  -S/p LLL lobectomy 6/10/15; 4 cycles adjuvant Carbo/Alimta completed 10/2015  -recurrence via biopsy mediastinal node 11/7/16  -XRT was delivered 12/4/2016 -  02/10/2017 for a total of 6000 cGy given over 30 fractions  -4 additional cycles Carbo/Alimta completed, followed by maintenance Alimta  -Cycle #2 of maintenace Alimta (with 25% dose reduction) was last received 5/17/17, held since that time due to poor performance status.   -Continue to hold treatment.     2. Acute respiratory failure, radiation pneumonitis per pulmonology  -has been able to wean high flow O2, currently O2 2L via NC. Managed per pulmonology.   -off antibiotics  - Dr. Feliciano resumed Prednisone on 08/30/2017, decreased to 10 mg daily on 9/7/17.   - Recent echocardiogram LH showed a normal EF 55-60%    -CXR 8/23/17 shows chronic dense opacities throughout the left lung with only minimal aeration.  Probable chronic left pleural effusion.  Chronic right perihilar opacities. Chronic appearing opacities compared to chest CT dated 6/1/17. No pneumothorax.   - CXR 8/30/17 revealed Overall stable appearance of the chest. Near-complete opacification of the left hemithorax is unchanged and likely reflects volume loss with atelectasis. A superimposed infectious process or pleural effusion cannot be excluded     4.  Leukocytosis, resolved.  - WBC normalized at 8.74 9/7/17.    5.  Deconditioning.  -continue PT, activity tolerance slowly improving  -encourage OOB  -on Prednisone for steroid myopathy, per Dr. Feliciano  -CK NL at 24 on 9/5/17    No new recommendations from Onc standpoint at this time. Will reassess as outpatient  Continue PT. Continue steroids,nebs O2 per pulmonology.  Considering [facility] for continued rehab next week      Jodie Bellamy, FELICITA  09/08/17  7:08 AM

## 2017-09-09 LAB
GLUCOSE BLDC GLUCOMTR-MCNC: 100 MG/DL (ref 70–130)
GLUCOSE BLDC GLUCOMTR-MCNC: 115 MG/DL (ref 70–130)
GLUCOSE BLDC GLUCOMTR-MCNC: 181 MG/DL (ref 70–130)
GLUCOSE BLDC GLUCOMTR-MCNC: 268 MG/DL (ref 70–130)

## 2017-09-09 PROCEDURE — 97116 GAIT TRAINING THERAPY: CPT

## 2017-09-09 PROCEDURE — 63710000001 PREDNISONE PER 5 MG: Performed by: INTERNAL MEDICINE

## 2017-09-09 PROCEDURE — 63710000001 INSULIN LISPRO (HUMAN) PER 5 UNITS: Performed by: INTERNAL MEDICINE

## 2017-09-09 PROCEDURE — 82962 GLUCOSE BLOOD TEST: CPT

## 2017-09-09 PROCEDURE — 63710000001 INSULIN LISPRO (HUMAN) PER 5 UNITS: Performed by: NURSE PRACTITIONER

## 2017-09-09 NOTE — PROGRESS NOTES
[facility]  JULIETTE Michaels M.D.  FELICITA Llamas      Internal Medicine Progress Note    9/9/2017   11:10 AM    Name:  Gil Peters  MRN:    [id number]     Acct:     [id number]   Room:  [address] [place] [address] Day: 0     Admit Date: 8/23/2017  9:04 PM  PCP: Bong Barnett MD    Subjective:     C/C: remains very weak. Feeling better.    Interval History: Status:  Improved.  Up in chair. No family at bedside. Tolerating current treatment. Progressing with therapy. Feeling stronger. Plans to discharge to [facility] on Monday for continued rehabilitation and strengthening.     Review of Systems   Constitution: Positive for weakness and malaise/fatigue. Negative for chills, decreased appetite, weight gain and weight loss.   HENT: Negative for congestion, ear discharge, hoarse voice and tinnitus.    Eyes: Negative for blurred vision, discharge, visual disturbance and visual halos.   Cardiovascular: Positive for dyspnea on exertion. Negative for chest pain, claudication, irregular heartbeat, leg swelling, orthopnea and paroxysmal nocturnal dyspnea.   Respiratory: Positive for shortness of breath. Negative for cough, sputum production and wheezing.    Endocrine: Negative for cold intolerance, heat intolerance and polyuria.   Hematologic/Lymphatic: Negative for adenopathy. Does not bruise/bleed easily.   Skin: Negative for dry skin, itching and suspicious lesions.   Musculoskeletal: Negative for arthritis, back pain, falls, joint pain, muscle weakness and myalgias.   Gastrointestinal: Negative for abdominal pain, constipation, diarrhea, dysphagia and hematemesis.   Genitourinary: Negative for bladder incontinence, dysuria and frequency.   Neurological: Negative for aphonia, disturbances in coordination and dizziness.   Psychiatric/Behavioral: Negative for altered mental status, depression, memory loss and substance abuse. The patient does not have insomnia and is not nervous/anxious.         Medications:     Allergies: No Known Allergies    Current Meds:   Current Facility-Administered Medications:   •  budesonide-formoterol (SYMBICORT) 80-4.5 MCG/ACT inhaler 2 puff, 2 puff, Inhalation, BID - RT, Nicho Gould MD  •  dextrose (D50W) solution 25 g, 25 g, Intravenous, Q15 Min PRN, Nicho Gould MD  •  dextrose (GLUTOSE) oral gel 15 g, 15 g, Oral, Q15 Min PRN, Nicho Gould MD  •  docusate sodium (COLACE) capsule 100 mg, 100 mg, Oral, BID, Nicho Gould MD  •  folic acid (FOLVITE) tablet 1 mg, 1 mg, Oral, Daily, Nicho Gould MD  •  glucagon (human recombinant) (GLUCAGEN DIAGNOSTIC) injection 1 mg, 1 mg, Subcutaneous, Q15 Min PRN, Nicho Gould MD  •  insulin lispro (humaLOG) injection 0-14 Units, 0-14 Units, Subcutaneous, 4x Daily With Meals & Nightly, Nicho Gould MD  •  insulin lispro (humaLOG) injection 5 Units, 5 Units, Subcutaneous, TID With Meals, FELICITA Almodovar  •  ipratropium (ATROVENT) nebulizer solution 0.5 mg, 0.5 mg, Nebulization, Q6H - RT, Joe Feliciano MD  •  levalbuterol (XOPENEX) nebulizer solution 1.25 mg, 1.25 mg, Nebulization, Q6H - RT, Joe Feliciano MD  •  megestrol (MEGACE) 40 MG/ML suspension 40 mg, 40 mg, Oral, Daily, Nicho Gould MD  •  polyethylene glycol (MIRALAX) packet 17 g, 17 g, Oral, Q24H, Nicho Gould MD  •  predniSONE (DELTASONE) tablet 10 mg, 10 mg, Oral, Daily With Breakfast, Nicho Gould MD  •  predniSONE (DELTASONE) tablet 40 mg, 40 mg, Oral, Once, Joe Feliciano MD    Data:     Code Status:  No Order    Family History   Problem Relation Age of Onset   • Cancer Mother      ovarian   • Cancer Father      prostate    • Melanoma Child        Social History     Social History   • Marital status:      Spouse name: N/A   • Number of children: N/A   • Years of education: N/A     Occupational History   • Retired - [employer]      Social History Main  "Topics   • Smoking status: Former Smoker     Types: Cigarettes   • Smokeless tobacco: Never Used      Comment: QUIT 41 YRS. AGO   • Alcohol use No   • Drug use: No   • Sexual activity: Defer     Other Topics Concern   • Not on file     Social History Narrative       Vitals:  Temp 97.6, P 103, R 20, /56, O2 94% (2lpm)  Ht 67.99\" (172.7 cm)  Wt 149 lb 12.8 oz (67.9 kg)  BMI 22.78 kg/m2    I/O (24Hr):  No intake or output data in the 24 hours ending 09/09/17 1110    Labs and imaging:      Lab Results (last 24 hours)     Procedure Component Value Units Date/Time    POC Glucose Fingerstick [[id number]]  (Abnormal) Collected:  09/08/17 1224    Specimen:  Blood Updated:  09/08/17 1236     Glucose 238 (H) mg/dL       : FREDDIE Osorio KatineMeter ID: [id number]       POC Glucose Fingerstick [[id number]]  (Normal) Collected:  09/08/17 1735    Specimen:  Blood Updated:  09/08/17 1754     Glucose 127 mg/dL       : FREDDIE WardineMeter ID: [id number]       POC Glucose Fingerstick [[id number]]  (Abnormal) Collected:  09/08/17 2052    Specimen:  Blood Updated:  09/08/17 2125     Glucose 210 (H) mg/dL       : LHKEYANNA Jacobson GMeter ID: [id number]       POC Glucose Fingerstick [[id number]]  (Normal) Collected:  09/09/17 0610    Specimen:  Blood Updated:  09/09/17 0623     Glucose 115 mg/dL       : TMTORIORMGenoveva Warren TimMeter ID: [id number]           Xr Chest 1 View    Result Date: 8/23/2017  Narrative: History: 76-year-old evaluated for port.  Reference: Chest CT 06/01/2017.  Findings: Frontal chest radiograph performed.  Chronic dense opacities throughout the left lung with only minimally aerated lung seen. Associated volume loss. Probable chronic left pleural effusion as well. Chronic right perihilar opacities. No pneumothorax. Left Port-A-Cath, tip at cavoatrial junction.      Impression: Impression: Chronic appearing opacities compared to June 1 CT. Subtle change would need CT " for assessment. This report was finalized on 08/23/2017 22:22 by  Mik Prater, .    Xr Chest Pa & Lateral    Result Date: 8/30/2017  Narrative: EXAMINATION: XR CHEST PA AND LATERAL- 8/30/2017 10:08 AM CDT  HISTORY: Radiation pneumonitis  COMPARISON: 08/23/2017  FINDINGS: Left subclavian Port-A-Cath remains in place with tip just inside the right atrium. There is redemonstration of near complete opacification of the left hemithorax, similar to the previous exam, with minimal aeration at the left lung apex. Chronic interstitial opacities are seen on the right with areas of scarring emanating from the right hilum. Surgical clips are seen in the region of the left hilum. The aorta is tortuous with atherosclerotic calcifications. The heart appears normal in size. There is elevation of the left hemidiaphragm, stable. No gross bony abnormality is identified.      Impression: Overall stable appearance of the chest when compared to the exam from one week prior. Near-complete opacification of the left hemithorax is unchanged and likely reflects volume loss with atelectasis. A superimposed infectious process or pleural effusion cannot be excluded. Correlate clinically. This report was finalized on 08/30/2017 10:11 by Dr. Jakob Delgado MD.    Physical Examination:        Physical Exam   Constitutional: He is oriented to person, place, and time. He appears well-developed and well-nourished.   HENT:   Head: Normocephalic and atraumatic.   Nose: Nose normal.   Mouth/Throat: Oropharynx is clear and moist.   Eyes: EOM are normal. Pupils are equal, round, and reactive to light.   Neck: Normal range of motion. Neck supple.   Cardiovascular: Normal rate, regular rhythm, normal heart sounds and intact distal pulses.    Pulmonary/Chest: Effort normal. He has decreased breath sounds.   Abdominal: Soft. Bowel sounds are normal.   Musculoskeletal: Normal range of motion.   Neurological: He is alert and oriented to person, place, and time. He  has normal reflexes.   Skin: Skin is warm and dry.   Psychiatric: He has a normal mood and affect. His behavior is normal.         Assessment:        Active Problems:    Acute on chronic respiratory failure with hypoxia    Non-small cell lung cancer (NSCLC)    Radiation pneumonitis    Past Medical History:   Diagnosis Date   • Cataract Extraction    • Hyperlipidemia    • Hypertension     low since taking radiation   • Lung cancer    • Prostate cancer         Plan:        1. Acute Hypoxic Respiratory Failure  2. NSCL of LLL  3. Bilateral CAP  4. Failure to Thrive  5. DM Type II with hyper/hypoglycemia  6. Leukocytosis    Continue current treatment.  Continue with current plan of care. Monitor counts. Increase activity as tolerated. Continue weaning efforts. Plan discharge to SNF Monday.     Electronically signed by FELICITA Victor on 9/9/2017 at 11:10 AM

## 2017-09-10 VITALS — WEIGHT: 156.2 LBS | BODY MASS INDEX: 23.67 KG/M2 | HEIGHT: 68 IN

## 2017-09-10 LAB
GLUCOSE BLDC GLUCOMTR-MCNC: 120 MG/DL (ref 70–130)
GLUCOSE BLDC GLUCOMTR-MCNC: 131 MG/DL (ref 70–130)
GLUCOSE BLDC GLUCOMTR-MCNC: 165 MG/DL (ref 70–130)
GLUCOSE BLDC GLUCOMTR-MCNC: 197 MG/DL (ref 70–130)

## 2017-09-10 PROCEDURE — 82962 GLUCOSE BLOOD TEST: CPT

## 2017-09-10 PROCEDURE — 63710000001 PREDNISONE PER 5 MG: Performed by: INTERNAL MEDICINE

## 2017-09-10 PROCEDURE — 97530 THERAPEUTIC ACTIVITIES: CPT

## 2017-09-10 PROCEDURE — 63710000001 INSULIN LISPRO (HUMAN) PER 5 UNITS: Performed by: NURSE PRACTITIONER

## 2017-09-10 PROCEDURE — 63710000001 INSULIN LISPRO (HUMAN) PER 5 UNITS: Performed by: INTERNAL MEDICINE

## 2017-09-10 PROCEDURE — 97116 GAIT TRAINING THERAPY: CPT

## 2017-09-10 NOTE — PROGRESS NOTES
[facility]  JULIETTE Michaels M.D.  FELICITA Llamas      Internal Medicine Progress Note    9/10/2017   8:55 AM    Name:  Gil Peters  MRN:    [id number]     Acct:     [id number]   Room:  [address] [place] [address] Day: 0     Admit Date: 8/23/2017  9:04 PM  PCP: Bong Barnett MD    Subjective:     C/C: remains very weak. Feeling better. No new complaints.    Interval History: Status:  Improved.  Resting in bed. No family at bedside. Tolerating current treatment. Progressing with therapy. Feeling stronger. Plans to discharge to [facility] on Monday for continued rehabilitation and strengthening.     Review of Systems   Constitution: Positive for weakness and malaise/fatigue. Negative for chills, decreased appetite, weight gain and weight loss.   HENT: Negative for congestion, ear discharge, hoarse voice and tinnitus.    Eyes: Negative for blurred vision, discharge, visual disturbance and visual halos.   Cardiovascular: Positive for dyspnea on exertion. Negative for chest pain, claudication, irregular heartbeat, leg swelling, orthopnea and paroxysmal nocturnal dyspnea.   Respiratory: Positive for shortness of breath. Negative for cough, sputum production and wheezing.    Endocrine: Negative for cold intolerance, heat intolerance and polyuria.   Hematologic/Lymphatic: Negative for adenopathy. Does not bruise/bleed easily.   Skin: Negative for dry skin, itching and suspicious lesions.   Musculoskeletal: Negative for arthritis, back pain, falls, joint pain, muscle weakness and myalgias.   Gastrointestinal: Negative for abdominal pain, constipation, diarrhea, dysphagia and hematemesis.   Genitourinary: Negative for bladder incontinence, dysuria and frequency.   Neurological: Negative for aphonia, disturbances in coordination and dizziness.   Psychiatric/Behavioral: Negative for altered mental status, depression, memory loss and substance abuse. The patient does not have insomnia and is not  nervous/anxious.        Medications:     Allergies: No Known Allergies    Current Meds:   Current Facility-Administered Medications:   •  budesonide-formoterol (SYMBICORT) 80-4.5 MCG/ACT inhaler 2 puff, 2 puff, Inhalation, BID - RT, Nicho Gould MD  •  dextrose (D50W) solution 25 g, 25 g, Intravenous, Q15 Min PRN, Nicho Gould MD  •  dextrose (GLUTOSE) oral gel 15 g, 15 g, Oral, Q15 Min PRN, Nicho Gould MD  •  docusate sodium (COLACE) capsule 100 mg, 100 mg, Oral, BID, Nicho Gould MD  •  folic acid (FOLVITE) tablet 1 mg, 1 mg, Oral, Daily, Nicho Gould MD  •  glucagon (human recombinant) (GLUCAGEN DIAGNOSTIC) injection 1 mg, 1 mg, Subcutaneous, Q15 Min PRN, Nicho Gould MD  •  insulin lispro (humaLOG) injection 0-14 Units, 0-14 Units, Subcutaneous, 4x Daily With Meals & Nightly, Nicho Gould MD  •  insulin lispro (humaLOG) injection 5 Units, 5 Units, Subcutaneous, TID With Meals, FELICITA Almodovar  •  ipratropium (ATROVENT) nebulizer solution 0.5 mg, 0.5 mg, Nebulization, Q6H - RT, Joe Feliciano MD  •  levalbuterol (XOPENEX) nebulizer solution 1.25 mg, 1.25 mg, Nebulization, Q6H - RT, Joe Feliciano MD  •  megestrol (MEGACE) 40 MG/ML suspension 40 mg, 40 mg, Oral, Daily, Nicho Gould MD  •  polyethylene glycol (MIRALAX) packet 17 g, 17 g, Oral, Q24H, Nicho Gould MD  •  predniSONE (DELTASONE) tablet 10 mg, 10 mg, Oral, Daily With Breakfast, Nicho Gould MD  •  predniSONE (DELTASONE) tablet 40 mg, 40 mg, Oral, Once, Joe Feliciano MD    Data:     Code Status:  No Order    Family History   Problem Relation Age of Onset   • Cancer Mother      ovarian   • Cancer Father      prostate    • Melanoma Child        Social History     Social History   • Marital status:      Spouse name: N/A   • Number of children: N/A   • Years of education: N/A     Occupational History   • Retired - [employer]      Social  "History Main Topics   • Smoking status: Former Smoker     Types: Cigarettes   • Smokeless tobacco: Never Used      Comment: QUIT 41 YRS. AGO   • Alcohol use No   • Drug use: No   • Sexual activity: Defer     Other Topics Concern   • Not on file     Social History Narrative       Vitals:  Temp 97.6, P 94, R 20, BP 99/72, O2 95% (2lpm)  Ht 67.99\" (172.7 cm)  Wt 149 lb 12.8 oz (67.9 kg)  BMI 22.78 kg/m2    I/O (24Hr):    Intake 2160 and output 2380 in the 24 hours ending 910 2017 at 7 AM  Labs and imaging:      Lab Results (last 24 hours)     Procedure Component Value Units Date/Time    POC Glucose Fingerstick [[id number]]  (Abnormal) Collected:  09/09/17 1127    Specimen:  Blood Updated:  09/09/17 1138     Glucose 268 (H) mg/dL       : [email or web address] Abdi JennyMeter ID: [id number]       POC Glucose Fingerstick [[id number]]  (Normal) Collected:  09/09/17 1654    Specimen:  Blood Updated:  09/09/17 1710     Glucose 100 mg/dL       : [email or web address] Luis Fernando PatMeter ID: [id number]       POC Glucose Fingerstick [[id number]]  (Abnormal) Collected:  09/09/17 2022    Specimen:  Blood Updated:  09/09/17 2034     Glucose 181 (H) mg/dL       : [email or web address] Kelvin TimMeter ID: [id number]       POC Glucose Fingerstick [[id number]]  (Normal) Collected:  09/10/17 0603    Specimen:  Blood Updated:  09/10/17 0615     Glucose 120 mg/dL       : [email or web address] Warren TimMeter ID: [id number]           Xr Chest 1 View    Result Date: 8/23/2017  Narrative: History: 76-year-old evaluated for port.  Reference: Chest CT 06/01/2017.  Findings: Frontal chest radiograph performed.  Chronic dense opacities throughout the left lung with only minimally aerated lung seen. Associated volume loss. Probable chronic left pleural effusion as well. Chronic right perihilar opacities. No pneumothorax. Left Port-A-Cath, tip at cavoatrial junction.      Impression: Impression: Chronic appearing opacities compared to June 1 CT. Subtle change " would need CT for assessment. This report was finalized on 08/23/2017 22:22 by  Mik Prater, .    Xr Chest Pa & Lateral    Result Date: 8/30/2017  Narrative: EXAMINATION: XR CHEST PA AND LATERAL- 8/30/2017 10:08 AM CDT  HISTORY: Radiation pneumonitis  COMPARISON: 08/23/2017  FINDINGS: Left subclavian Port-A-Cath remains in place with tip just inside the right atrium. There is redemonstration of near complete opacification of the left hemithorax, similar to the previous exam, with minimal aeration at the left lung apex. Chronic interstitial opacities are seen on the right with areas of scarring emanating from the right hilum. Surgical clips are seen in the region of the left hilum. The aorta is tortuous with atherosclerotic calcifications. The heart appears normal in size. There is elevation of the left hemidiaphragm, stable. No gross bony abnormality is identified.      Impression: Overall stable appearance of the chest when compared to the exam from one week prior. Near-complete opacification of the left hemithorax is unchanged and likely reflects volume loss with atelectasis. A superimposed infectious process or pleural effusion cannot be excluded. Correlate clinically. This report was finalized on 08/30/2017 10:11 by Dr. Jakob Delgado MD.    Physical Examination:        Physical Exam   Constitutional: He is oriented to person, place, and time. He appears well-developed and well-nourished.   HENT:   Head: Normocephalic and atraumatic.   Nose: Nose normal.   Mouth/Throat: Oropharynx is clear and moist.   Eyes: EOM are normal. Pupils are equal, round, and reactive to light.   Neck: Normal range of motion. Neck supple.   Cardiovascular: Normal rate, regular rhythm, normal heart sounds and intact distal pulses.    Pulmonary/Chest: Effort normal. He has decreased breath sounds.   Abdominal: Soft. Bowel sounds are normal.   Musculoskeletal: Normal range of motion.   Neurological: He is alert and oriented to person,  place, and time. He has normal reflexes.   Skin: Skin is warm and dry.   Psychiatric: He has a normal mood and affect. His behavior is normal.         Assessment:        1. Acute Hypoxic Respiratory Failure  2. NSCL of LLL  3. Bilateral CAP  4. Failure to Thrive  5. DM Type II with hyper/hypoglycemia  6. Leukocytosis       Plan:        1. Continue current treatment.    2. Continue with current plan of care.   3. Monitor counts, CBC, BMP and BNP in a.m.   4. Increase activity as tolerated.   5. Continue weaning efforts.   6. Plan discharge to SNF Monday.     Electronically signed by FELICITA Victor on 9/10/2017 at 8:55 AM

## 2017-09-11 LAB
ANION GAP SERPL CALCULATED.3IONS-SCNC: 7 MMOL/L (ref 4–13)
BASOPHILS # BLD AUTO: 0.02 10*3/MM3 (ref 0–0.2)
BASOPHILS NFR BLD AUTO: 0.3 % (ref 0–2)
BUN BLD-MCNC: 22 MG/DL (ref 5–21)
BUN/CREAT SERPL: 38.6 (ref 7–25)
CALCIUM SPEC-SCNC: 8.6 MG/DL (ref 8.4–10.4)
CHLORIDE SERPL-SCNC: 103 MMOL/L (ref 98–110)
CO2 SERPL-SCNC: 26 MMOL/L (ref 24–31)
CREAT BLD-MCNC: 0.57 MG/DL (ref 0.5–1.4)
DEPRECATED RDW RBC AUTO: 58.1 FL (ref 40–54)
EOSINOPHIL # BLD AUTO: 0.25 10*3/MM3 (ref 0–0.7)
EOSINOPHIL NFR BLD AUTO: 3.7 % (ref 0–4)
ERYTHROCYTE [DISTWIDTH] IN BLOOD BY AUTOMATED COUNT: 17.2 % (ref 12–15)
GFR SERPL CREATININE-BSD FRML MDRD: 139 ML/MIN/1.73
GLUCOSE BLD-MCNC: 104 MG/DL (ref 70–100)
GLUCOSE BLDC GLUCOMTR-MCNC: 104 MG/DL (ref 70–130)
GLUCOSE BLDC GLUCOMTR-MCNC: 146 MG/DL (ref 70–130)
HCT VFR BLD AUTO: 36.4 % (ref 40–52)
HGB BLD-MCNC: 11.5 G/DL (ref 14–18)
IMM GRANULOCYTES # BLD: 0.17 10*3/MM3 (ref 0–0.03)
IMM GRANULOCYTES NFR BLD: 2.5 % (ref 0–5)
LYMPHOCYTES # BLD AUTO: 0.92 10*3/MM3 (ref 0.72–4.86)
LYMPHOCYTES NFR BLD AUTO: 13.5 % (ref 15–45)
MCH RBC QN AUTO: 29.1 PG (ref 28–32)
MCHC RBC AUTO-ENTMCNC: 31.6 G/DL (ref 33–36)
MCV RBC AUTO: 92.2 FL (ref 82–95)
MONOCYTES # BLD AUTO: 1.03 10*3/MM3 (ref 0.19–1.3)
MONOCYTES NFR BLD AUTO: 15.1 % (ref 4–12)
NEUTROPHILS # BLD AUTO: 4.42 10*3/MM3 (ref 1.87–8.4)
NEUTROPHILS NFR BLD AUTO: 64.9 % (ref 39–78)
NT-PROBNP SERPL-MCNC: 813 PG/ML (ref 0–1800)
PLATELET # BLD AUTO: 231 10*3/MM3 (ref 130–400)
PMV BLD AUTO: 9 FL (ref 6–12)
POTASSIUM BLD-SCNC: 4 MMOL/L (ref 3.5–5.3)
RBC # BLD AUTO: 3.95 10*6/MM3 (ref 4.8–5.9)
SODIUM BLD-SCNC: 136 MMOL/L (ref 135–145)
WBC NRBC COR # BLD: 6.81 10*3/MM3 (ref 4.8–10.8)

## 2017-09-11 PROCEDURE — 63710000001 PREDNISONE PER 5 MG: Performed by: INTERNAL MEDICINE

## 2017-09-11 PROCEDURE — 25010000002 HEPARIN FLUSH (PORCINE) 100 UNIT/ML SOLUTION: Performed by: NURSE PRACTITIONER

## 2017-09-11 PROCEDURE — 83880 ASSAY OF NATRIURETIC PEPTIDE: CPT | Performed by: INTERNAL MEDICINE

## 2017-09-11 PROCEDURE — 63710000001 INSULIN LISPRO (HUMAN) PER 5 UNITS: Performed by: NURSE PRACTITIONER

## 2017-09-11 PROCEDURE — 85025 COMPLETE CBC W/AUTO DIFF WBC: CPT | Performed by: INTERNAL MEDICINE

## 2017-09-11 PROCEDURE — 80048 BASIC METABOLIC PNL TOTAL CA: CPT | Performed by: INTERNAL MEDICINE

## 2017-09-11 PROCEDURE — 82962 GLUCOSE BLOOD TEST: CPT

## 2017-09-11 RX ORDER — SODIUM CHLORIDE 0.9 % (FLUSH) 0.9 %
10 SYRINGE (ML) INJECTION EVERY 12 HOURS SCHEDULED
Status: DISCONTINUED | OUTPATIENT
Start: 2017-09-11 | End: 2017-09-11 | Stop reason: HOSPADM

## 2017-09-11 RX ORDER — PSEUDOEPHEDRINE HCL 30 MG
100 TABLET ORAL 2 TIMES DAILY
Qty: 30 CAPSULE
Start: 2017-09-11

## 2017-09-11 RX ORDER — SODIUM CHLORIDE 0.9 % (FLUSH) 0.9 %
10 SYRINGE (ML) INJECTION AS NEEDED
Status: DISCONTINUED | OUTPATIENT
Start: 2017-09-11 | End: 2017-09-11 | Stop reason: HOSPADM

## 2017-09-11 RX ORDER — BUDESONIDE AND FORMOTEROL FUMARATE DIHYDRATE 80; 4.5 UG/1; UG/1
2 AEROSOL RESPIRATORY (INHALATION)
Refills: 12
Start: 2017-09-11

## 2017-09-11 RX ORDER — PREDNISONE 10 MG/1
10 TABLET ORAL
Start: 2017-09-11 | End: 2017-11-14

## 2017-09-11 RX ORDER — POLYETHYLENE GLYCOL 3350 17 G/17G
17 POWDER, FOR SOLUTION ORAL EVERY 24 HOURS
Qty: 7 EACH
Start: 2017-09-11 | End: 2017-11-14

## 2017-09-11 RX ORDER — LEVALBUTEROL 1.25 MG/.5ML
2 SOLUTION, CONCENTRATE RESPIRATORY (INHALATION)
Refills: 12
Start: 2017-09-11

## 2017-09-11 NOTE — PROGRESS NOTES
Progress Note    Patient name: Gil Peters  Patient : 1940  VISIT # [id number]  MR #[id number]  Room: 588    Subjective  No significant changes overnight. No events. Anticipates discharge to [facility] today.     Interval History:  Mr. Gil Peters is a 76 year old  gentleman who holds a diagnosis of non-small cell lung cancer.   Gil has completed concurrent therapy with XRT and 4 cycles of carboplatin/Alimta for his locally advanced recurrent lung cancer.     He last received Cycle #2 maintenace Alimta (with 25% dose reduction) on nd has been on hold since that time due to significant fatigue.      Re-staging scans 2017 showed a consolidation RLL concerning for atelectasis vs radiation pneumonitis vs tumor progression. He was seen by pulmonary with bronchoscopy performed that was unremarkable. He was given antibiotics and steroids with marked improvement. On 17, Gil had progressive dyspnea and was arranged for home O2.     He was admitted to  8/15/17 - 17 for acute respiratory failure, requiring high flow oxygen. CT of chest 17 showed bilateral pneumonic infiltrate more severe in the [place] and left side pleural effusion.  He was treated for pneumonia with cefepime and Levaquin as well as solumedrol and IV diuretics. Radiation pneumonitis, lymphangitic carcinomatosis or progression of lung cancer however cannot be excluded.     Gil was transferred to LTAC at [facility] on 17 due to continued high flow oxygen requirements, continuing weaning efforts.  Oncology is following for continuity of care.    Diagnosis  · Stage 0- 2015   · Recurrent NSCLC-adenocarcinoma (Locally advanced)  · Molecular profile: EGFR/ALK/ROS negative  Treatment summary:  · Left lower lobe lobectomy 6/10/2015   · Carboplatin/Alimta ×4 cycles adjuvant completed 2015   · Carboplatin /Alimta x 4 cycles(2017->2017) with concurrent XRT for a total of 6000  cGy given from 12/4/2016-02/10/2017 over 30 fractions.   · Alimta maintenance therapy every 3 weeks initiated on 4/5/2017. Cycle #2 (25% dose reduction) on 5/17/2017 and held afterwards due to poor PS and fatigue.   · June-August 2017- he received steroids for radiation pneumonitis, with marked improvement of his respiratory symptoms. Alimta maintenance on hold.  Cancer history- Locally relapsed(mediastinal) Lung Cancer-adenocarcinoma EGFR, ALK and ROS negative  The patient is a 76 years old male first seen by me on 11/18/2016 referred by Dr. Yin's office. The following are landmark events on this patient cancer history:  · Preoperative x-ray performed before a radical prostatectomy on 4/22/2014 showed a left lower lobe pulmonary infiltrate   · Referred to respiratory medicine/bronchoscopy 7/2/2014 was unrevealing.   · Repeat CT scan September 2014 compared with April 2014-no change in the infiltrate pattern.   · Repeat CT April 2015-lung nodule associated with infiltrated.   · 4/21/2015-left lower lobe final needle aspiration= well-differentiated papillary adenocarcinoma consistent with a lung primary demonstrating a lipidic growth pattern. TTF-1 positive, napsin A positive. CDX2 cytoplasmic positivity but this was not consistent with colon malignancy. PSA was negative   · 4/29/2016-PET CT scan with increased SUV uptake in the left lower lobe (SUV 7.6), mass measures 7.1 x 4.9 cm. Suspicious right hilar adenopathy.   · 6/10/2015-left lower lobe lobectomy-well-differentiated lung adenocarcinoma with predominant lepidic pattern. Margins negative. 5 peribronchial lymph nodes negative for metastatic disease. Tumor size 10.6 cm. No invasive component found. [id number] (stage 0). Molecular arkers (EGFR, ALK, ROS-1 were all negative)   · Adjuvant chemotherapy carboplatin/Alimta ×4 cycles completed October 2015   · Patient placed on surveillance follow-up with imaging surveillance.  -------------------------------- relapse  October 2016--------------------------  · 10/26/2016- PET/CT scan showing increased FDG uptake within the prevascular mediastinal lymph nodes (6.4). Hypermetabolic AP window and bilateral hilar lymph nodes (SUV 5.5). FDG activity of the left pleural thickening (SUV 5.5). Findings are consistent with recurrent disease. No evidence of disease below the diaphragm.   · 11/7/20166105-LM-igskrj biopsy ([facility]) mediastinal node consistent with recurrent well-differentiated papillary adenocarcinoma.   · 11/18/2016- Recommended again a platinum based treatment with Carboplatin/Alimta + RT followed by Alimta maintenance   · 11/18/2016- Guardant 360 sent- no clinically significant mutation found. (MET T913m)   · 11/28/2016 MRI brain re-staging- KIRSTIN   · 3/13/2017- CT scan of abdomen and pelvis revealed a periaortic lymph node in the upper abdomen measuring 12 mm compared to 9.5 mm and a second periaortic lymph node measuring 12.5 mm compared to 7.5 mm.   · 3/13/2017- CT scan of chest revealed extensive new nodular consolidation in the left mid lung with air bronchograms concerning for neoplasm or acute pneumonitis. Suspect acute pneumonitis secondary to XRT. No measurable mediastinal lymphadenopathy.   · 4/5/2017-started on maintenance therapy with Alimta 500 mg/m². NCI toxicity, fatigue grade 2. Patient requested to delay cycle #2 for 3 weeks. Cycle 2 delivered 5/17/2016 with 25% dose reduction Alimta.   · 6/1/2017- CT scan of chest revealed volume loss of the left hemothorax with elevation of the left hemidiaphragm and shift of the mediastinum structures. Near complete consolidation of the left lung with air bronchograms with minimal aerated lung appreciated in the apex and lung base. Developing an interval increase in infiltrate of nodular opacities in the right lung, perihilar/upper and lower lobe regions. Developing irregular nodular opacity RUL. RUL nodule measuring 5 mm, previously measured 3 mm. (Dr. Whitman spoke with  radiologist related to results with differential diagnosis being radation pneumonits vs pneumonia vs lung collapse versus progression of disease). He was treated with Levofloxacin x 7 days.   · 6/1/2017- CT scan of abdomen and pelvis revealed stable appearance from 3/13/2017. Mildly prominent periaortic lymph nodes, stable. No evidence of neoplastic disease.   · 06/08/2017- He was seen by pulmonary Dr Joe Feliciano.   · 06/13/2017- Bronchoscopy was performed and did not reveal any obstructive lesion. Cytology and others BAL studies were unremarkable. He wa spescribed inhaled steroids and long B2 agonists.   · 06/26/2017- Mild improvement of his respiratory symptoms. We discussed about giving steroids for anorexia, possible radiation pneumonitis and fatigue. Continue to hold alimta maintenance due to poor performance. Prednisone prescribed 1 mg to take   · 7/28/2017- Alimta maintenance on hold. He was given steroids during the last visit and has gained about 5 pounds. His respiratory symptoms have improved markedly. Therefore, I believe that this was consistent with radiation pneumonitis. He wants to hold his Alimta until the next set of scans in September 2017.  -----------------------------------------------------------------------------------------  Cancer history #2-Prostate cancer (GS 3+4) 04/2014  · Radical prostatectomy on 4/22/2014 [id number]   · PSA <0.075 01/04/2017.  This is being followed by his urologist.       REVIEW OF SYSTEMS:   General: positive for  - weakness, improving  ENT: negative for - oral lesions  Allergy and Immunology: negative   Hematological and Lymphatic: positive for - slow weight gain  Respiratory: positive for - shortness of breath, improved. O2 weaned, now O2 2L via NC  Cardiovascular: negative for - chest pain or palpitations  Gastrointestinal: negative for - appetite improved  Genito-Urinary: negative for - dysuria or hematuria  Musculoskeletal: positive for - generalized weakness,  improving with better activity tolerance  Neurological: negative for - confusion, dizziness or headaches  Dermatological: negative for pruritus and rash    Objective     O2 sat currently 94% on O2 2L/min via NC     PHYSICAL EXAM:  General Appearance: Alert, cooperative, spouse at bedside  Head: Normocephalic, without obvious abnormality, atraumatic  Eyes: Normal conjunctivae and sclerae normal, anicteric, no pallor, corneas clear  Ears: Ears appear intact with no abnormalities noted, hearing grossly normal  Throat: No oral lesions, no thrush, oral mucosa moist  Neck: No adenopathy, supple, trachea midline, no JVD  Lungs: diminished on left, otherwise clear to auscultation, respirations regular, even and unlabored, O2 2L/min via NC in use.  Heart: Regular rate and rhythm  Abdomen: Normal bowel sounds, no masses, no organomegaly, soft non-tender, non-distended, no guarding, no rebound tenderness  Genitalia: Deferred  Extremities: Moves all extremities well, no edema, no cyanosis, no redness  Skin: No bleeding, bruising or rash  Lymph nodes: No palpable adenopathy  Neurologic: Grossly intact though not formally tested    CBC    Results from last 7 days  Lab Units 09/11/17  0611 09/07/17  0347   WBC 10*3/mm3 6.81 8.74   HEMOGLOBIN g/dL 11.5* 11.2*   HEMATOCRIT % 36.4* 35.3*   PLATELETS 10*3/mm3 231 178       CMP  Lab Results   Component Value Date    GLUCOSE 104 (H) 09/11/2017    BUN 22 (H) 09/11/2017    CREATININE 0.57 09/11/2017    EGFRIFNONA 139 09/11/2017    BCR 38.6 (H) 09/11/2017    CO2 26.0 09/11/2017    CALCIUM 8.6 09/11/2017    ALBUMIN 3.10 (L) 08/24/2017    LABIL2 1.2 08/24/2017    AST 44 08/24/2017    ALT 65 (H) 08/24/2017           Assessment/Plan     1.  Locally advanced recurrent NSCLC (adenocarcinoma)  -S/p LLL lobectomy 6/10/15; 4 cycles adjuvant Carbo/Alimta completed 10/2015  -recurrence via biopsy mediastinal node 11/7/16  -XRT was delivered 12/4/2016 - 02/10/2017 for a total of 6000 cGy given over 30  fractions  -4 additional cycles Carbo/Alimta completed, followed by maintenance Alimta  -Cycle #2 of maintenace Alimta (with 25% dose reduction) was last received 5/17/17, held since that time due to poor performance status.   -Continue to hold treatment.     2. Acute respiratory failure, radiation pneumonitis per pulmonology  -has been able to wean high flow O2, currently O2 2L via NC. Managed per pulmonology.   -off antibiotics  - Dr. Feliciano resumed Prednisone on 08/30/2017, decreased to 10 mg daily on 9/7/17.   - Recent echocardiogram LH showed a normal EF 55-60%    -CXR 8/23/17 shows chronic dense opacities throughout the left lung with only minimal aeration.  Probable chronic left pleural effusion.  Chronic right perihilar opacities. Chronic appearing opacities compared to chest CT dated 6/1/17. No pneumothorax.   - CXR 8/30/17 revealed Overall stable appearance of the chest. Near-complete opacification of the left hemithorax is unchanged and likely reflects volume loss with atelectasis. A superimposed infectious process or pleural effusion cannot be excluded     4.  Leukocytosis, resolved.  - WBC normalized at 6.81.    5.  Deconditioning.  -continue PT, activity tolerance slowly improving  -encourage OOB  -on Prednisone for steroid myopathy, per Dr. Feliciano  -CK NL at 24 on 9/5/17    No new recommendations from Onc standpoint at this time. Will reassess as outpatient in 4 weeks  Continue PT. Continue steroids,nebs O2 per pulmonology.  Plans to Discharge to [facility] for continued rehab today.      Jodie Bellamy, FELICITA  09/11/17  9:05 AM

## 2017-09-11 NOTE — DISCHARGE SUMMARY
[facility]  Dwayne Gould M.D.  CARINE Gould M.D.  FELICITA Llamas    Internal Medicine Discharge Summary    Patient ID: Gil Peters  MRN: [id number]     Acct:  [id number]       Patient's PCP: Bong Barnett MD    Admit Date: 8/23/2017     Discharge Date:   9/11/17    Admitting Physician: Nicho Gould MD    Discharge Physician: FELICITA Almodovar     Active Discharge Diagnoses:    1. Acute Hypoxic Respiratory Failure  2. NSCL of LLL  3. Bilateral CAP  4. Failure to Thrive  5. DM Type II with hyper/hypoglycemia  6. Leukocytosis      Hospital Problems  Active Problems:    Acute on chronic respiratory failure with hypoxia    Non-small cell lung cancer (NSCLC)    Radiation pneumonitis     Past Medical History:   Diagnosis Date   • Cataract Extraction    • Hyperlipidemia    • Hypertension     low since taking radiation   • Lung cancer    • Prostate cancer        The patient was seen and examined on the day of discharge and this discharge summary is in conjunction with any daily progress note from day of discharge.    Code Status:  No Order    Hospital Course: The patient has a known history of left lower lobe lung cancer s/p lobectomy and prostate cancer s/p prostatectomy. He had been in his usual state of health when he developed increased weakness, shortness of breath and cough. He presented to the hospital with his complaints and workup revealed consolidating airspace disease with left hemothorax, air bronchograms and a pleural effusion with right perihilar interstitial infiltrate. He was found to have an elevated lactic acid level as well as an elevated WBC count. Ct of the chest revealed bilateral pneumonic infiltrates worse in the left upper lobe with an associated left side reactive pleural effusion. The patient was evaluated by pulmonology and placed on empiric antibiotic therapy, nebulizer treatments and pulse dose steroids as well as supplemental oxygen. The  patient responded fairly well to treatment, but was still requiring high flow oxygen. At that time, the patient was transferred to [facility] for further evaluation and treatment. He was able to weaned to oxygen at 1-2 lpm per nasal cannula and his strength and stamina improved with therapy. The patient did suffer a minor fall in the restroom that did not result in any injuries. The patient has progressed with therapy and is now felt stable for discharge to SNF for continued rehabilitation prior to his planned return to home.     Consults:  Dr. Feliciano (pulmonology)  Dr. Stokes (oncology)    Disposition: SNF    Discharged Condition: Stable    Follow Up: Debbie Whitman MD  [address] [place] [address] [address]  [place] [address]  [phone]    Follow up on 10/11/2017  10:30      Diet: Diet Regular; Consistent Carbohydrate    Discharge Medications:    Gil Peters   Home Medication Instructions ALDO:[id number]    Printed on:09/11/17 8949   Medication Information                      budesonide-formoterol (SYMBICORT) 80-4.5 MCG/ACT inhaler  Inhale 2 puffs 2 (Two) Times a Day.             docusate sodium 100 MG capsule  Take 100 mg by mouth 2 (Two) Times a Day.             folic acid (FOLVITE) 1 MG tablet  Take 1 mg by mouth Daily.             insulin lispro (humaLOG) 100 UNIT/ML injection  Inject 0-14 Units under the skin 4 (Four) Times a Day With Meals & at Bedtime.             insulin lispro (humaLOG) 100 UNIT/ML injection  Inject 5 Units under the skin 3 (Three) Times a Day With Meals.             ipratropium (ATROVENT) 0.02 % nebulizer solution  Take 2.5 mL by nebulization Every 6 (Six) Hours.             levalbuterol (XOPENEX) 1.25 MG/0.5ML nebulizer solution  Take 2 ampules by nebulization Every 6 (Six) Hours.             megestrol (MEGACE) 40 MG/ML suspension  Take  by mouth Daily.             polyethylene glycol (MIRALAX) pack packet  Take 17 g by mouth Daily.             predniSONE (DELTASONE) 10  MG tablet  Take 1 tablet by mouth Daily With Breakfast.             simvastatin (ZOCOR) 40 MG tablet  Take 40 mg by mouth.                 Time Spent on discharge is  32 minutes in patient examination, evaluation, patient/family counseling as well as medication reconciliation, prescriptions for required medications, discharge plan and follow up.     Electronically signed by FELICITA Almodovar on 9/11/2017 at 9:41 AM

## 2017-09-11 NOTE — PROGRESS NOTES
PULMONARY AND CRITICAL CARE PROGRESS NOTE - Formerly [facility]  Patient: Gil Peters    1940   [id number]  09/11/17   8:16 AM  Referring Provider: Nicho Gould MD  Chief Complaint: Respiratory failure with hypoxemia   Interval history: Patient breathing comfortably on 2L at this time. His wife is present and [facility]. Cough persists but no worse on 10mg prednisone. No other aggravating, alleviating factors or associated symptoms.  Review of Systems:   Review of Systems   Respiratory: Positive for cough. Negative for shortness of breath and wheezing.    Cardiovascular: Negative for chest pain and palpitations.   Skin: Negative for rash.   Neurological: Positive for weakness. Negative for light-headedness.   Physical Exam:  Vital signs: bp 109/60 p 100 r 20  Sat 96 on 2L t 98.0  Physical Exam   Constitutional: He appears well-developed and well-nourished. No distress. Nasal cannula in place.   HENT:   Head: Normocephalic and atraumatic.   Nose: Nose normal.   Eyes: No scleral icterus.   Cardiovascular: Normal heart sounds.    Pulmonary/Chest: Effort normal. No respiratory distress. He has decreased breath sounds. He has no wheezes. He has no rales.   Musculoskeletal: Normal range of motion. He exhibits no edema.   Neurological: He is alert. He exhibits normal muscle tone.         Pulmonary Assessment:  1. hypoxic respiratory failure, acute on chronic, markedly better and no longer severe  2. Lung CA stable  3. Atelectasis of left lung stable  4. Consolidation of left lung stable  5. Radiation pneumonitis  6. Weakness; may be due to steroids + deconditioning + hmg-coa reductase inhibitor  Recommend:   · Ok to rehab today per pulmonary  · Will change f/u to the end of the month in the office instead of the 18th as scheduled  · Will need to continue oxygen at 2lpm  · Continue symbicort,  atrovent/xopenex nebs  · Continue prednisone 10mg daily until f/u in the office.   · Ok to resume home dose of zocor. Instructed to discontinue if muscle weakness returns.   Electronically signed by FELICITA Li on 9/11/2017 at 8:16 AM     Physician substantive contribution:  Pertinent symptoms/interval history include: feels better, still has some cough  Respiratory exam shows pertinent findings of:diminished breath sounds.  Plan includes: to home/[facility] rehab.  Keep on 10 mg predinsone.  Office follow up  I have seen and examined patient personally, performing a face-to-face diagnostic evaluation with plan of care reviewed and developed with APRN and nursing staff. I have addended and/or modified the above history of present illness, physical examination, and assessment and plan to reflect my findings and impressions. Essential elements of the care plan were discussed with APRN above.  Agree with findings and assessment/plan as documented above.    Electronically signed by Joe Feliciano MD, on 9/11/2017, 8:28 AM

## 2017-10-12 PROBLEM — C34.90 MALIGNANT NEOPLASM OF UNSPECIFIED PART OF UNSPECIFIED BRONCHUS OR LUNG (HCC): Status: ACTIVE | Noted: 2017-01-01

## 2017-10-12 PROBLEM — J96.90 RESPIRATORY FAILURE (HCC): Status: ACTIVE | Noted: 2017-01-01

## 2017-10-30 NOTE — PROGRESS NOTES
[facility]  [facility] [facility] [address]  Dept: [phone]  Dept Fax: [phone]: [phone]      Visit Date: 10/30/2017    Estrella Briscoe is a 68 y.o. male who presents today for:  Chief Complaint   Patient presents with    Other     face to face for home health         HPI:     Carlie Banda is in for a follow-up visit. He's needing a referral for home care. This is a face-to-face exam for that. He's been in the hospital for over 50 days back in the summer at [facility] for radiation pneumonitis and then went to a rehab facility for the last month. He is home now and is not doing well. He remains quite weak he needs occupational therapy evaluation to see if they can help. It sounds like he's maxed out on physical therapy. He sees Dr. Dawn neves for follow-up on his lung cancer.  The requesting a flu shot today    Past Medical History:   Diagnosis Date    Cancer ([address]     Lung, Prostate    Cataract     HTN (hypertension) 6/22/2017    Hyperlipidemia     Hyperlipidemia 6/22/2017    Hypertension     Palliative care encounter       Past Surgical History:   Procedure Laterality Date    BRONCHOSCOPY  07/2/14    COLONOSCOPY      LOBECTOMY Left     left lower    LUNG BIOPSY  4/21/15    PROSTATECTOMY  04/22/14    THORACOTOMY Left 06/10/15       Family History   Problem Relation Age of Onset    Cancer Mother     Cancer Father     Cancer Maternal Uncle        Social History   Substance Use Topics    Smoking status: Former Smoker    Smokeless tobacco: Never Used    Alcohol use No      Current Outpatient Prescriptions   Medication Sig Dispense Refill    levalbuterol (XOPENEX) 1.25 MG/0.5ML nebulizer solution Inhale 2.5 mg into the lungs      megestrol (MEGACE ES) 625 MG/5ML suspension Take 40 mg by mouth      simvastatin (ZOCOR) 40 MG tablet Take 40 mg by mouth nightly      budesonide-formoterol (SYMBICORT) 80-4.5 MCG/ACT AERO Inhale 2 puffs into the lungs      docusate (COLACE, DULCOLAX) 100 MG CAPS Take 100 mg by mouth      folic acid (FOLVITE) 1 MG tablet        No current facility-administered medications for this visit. Allergies   Allergen Reactions    Ambien [Zolpidem Tartrate] Anxiety     Pt becomes highly agitated and confused         Subjective:      Review of Systems   Constitutional: Positive for fatigue and unexpected weight change. Negative for activity change, appetite change and fever. HENT: Negative for congestion, hearing loss, sinus pressure, sore throat and trouble swallowing. Eyes: Negative for discharge and itching. Respiratory: Positive for shortness of breath. Negative for wheezing. Cardiovascular: Negative for chest pain, palpitations and leg swelling. Gastrointestinal: Negative for abdominal distention, abdominal pain, blood in stool, nausea and vomiting. Endocrine: Negative for cold intolerance, heat intolerance and polydipsia. Genitourinary: Negative for flank pain, frequency, hematuria and urgency. Musculoskeletal: Negative for arthralgias, back pain and joint swelling. Skin: Negative for rash and wound. Allergic/Immunologic: Negative for environmental allergies and food allergies. Neurological: Positive for weakness. Negative for dizziness, tremors, syncope, numbness and headaches. Hematological: Negative for adenopathy. Psychiatric/Behavioral: Negative for agitation and hallucinations. The patient is not nervous/anxious. Objective:     /70   Pulse 106   SpO2 90%    Physical Exam   Constitutional: He is oriented to person, place, and time. He appears well-developed and well-nourished. No distress. HENT:   Head: Normocephalic and atraumatic. Right Ear: External ear normal.   Left Ear: External ear normal.   Nose: Nose normal.   Mouth/Throat: Oropharynx is clear and moist. No oropharyngeal exudate.    Eyes: Conjunctivae and EOM are normal. Pupils are equal, continue the same. Radiation pneumonitis as outlined above. He's really seeped all the treatment he can. He is now followed by Dr. Reji Bolton for further evaluation of his cancer. We'll give him a flu shot today and we'll make the home health care referral.  Return in about 3 months (around 1/30/2018). Patient given educational materials - see patient instructions. Discussed use, benefit, and side effects of prescribed medications. All patient questions answered. Pt voiced understanding. Reviewed health maintenance. Instructed to continue current medications, diet and exercise. Patient agreed with treatment plan. **This report has been created using voice recognition software. It may contain minor errors which are inherent in voice recognition technology. **    Electronically signed by Vincenzo Atkinson MD on 10/30/2017 at 11:01 AM

## 2017-11-06 ENCOUNTER — TRANSCRIBE ORDERS (OUTPATIENT)
Dept: ADMINISTRATIVE | Facility: HOSPITAL | Age: 77
End: 2017-11-06

## 2017-11-06 DIAGNOSIS — C34.32 PRIMARY MALIGNANT NEOPLASM OF BRONCHUS OF LEFT LOWER LOBE (HCC): Primary | ICD-10-CM

## 2017-11-09 ENCOUNTER — HOSPITAL ENCOUNTER (OUTPATIENT)
Dept: CT IMAGING | Facility: HOSPITAL | Age: 77
Discharge: HOME | End: 2017-11-09
Attending: INTERNAL MEDICINE

## 2017-11-09 ENCOUNTER — HOSPITAL ENCOUNTER (OUTPATIENT)
Dept: CT IMAGING | Facility: HOSPITAL | Age: 77
Discharge: HOME | End: 2017-11-09
Attending: INTERNAL MEDICINE | Admitting: INTERNAL MEDICINE

## 2017-11-09 DIAGNOSIS — C34.32 PRIMARY MALIGNANT NEOPLASM OF BRONCHUS OF LEFT LOWER LOBE (HCC): ICD-10-CM

## 2017-11-09 PROCEDURE — 0 FLUDEOXYGLUCOSE F18 SOLUTION: Performed by: INTERNAL MEDICINE

## 2017-11-09 PROCEDURE — A9552 F18 FDG: HCPCS | Performed by: INTERNAL MEDICINE

## 2017-11-09 PROCEDURE — 78815 PET IMAGE W/CT SKULL-THIGH: CPT

## 2017-11-09 RX ADMIN — FLUDEOXYGLUCOSE F18 1 DOSE: 300 INJECTION INTRAVENOUS at 10:30

## 2017-11-10 ENCOUNTER — TRANSCRIBE ORDERS (OUTPATIENT)
Dept: ADMINISTRATIVE | Facility: HOSPITAL | Age: 77
End: 2017-11-10

## 2017-11-10 DIAGNOSIS — J91.0 MALIGNANT PLEURAL EFFUSION: ICD-10-CM

## 2017-11-10 DIAGNOSIS — C34.32 PRIMARY MALIGNANT NEOPLASM OF BRONCHUS OF LEFT LOWER LOBE (HCC): Primary | ICD-10-CM

## 2017-11-14 ENCOUNTER — HOSPITAL ENCOUNTER (OUTPATIENT)
Dept: ULTRASOUND IMAGING | Facility: HOSPITAL | Age: 77
Discharge: HOME | End: 2017-11-14
Attending: INTERNAL MEDICINE | Admitting: INTERNAL MEDICINE

## 2017-11-14 VITALS
HEART RATE: 98 BPM | TEMPERATURE: 98 F | WEIGHT: 154.5 LBS | SYSTOLIC BLOOD PRESSURE: 127 MMHG | OXYGEN SATURATION: 95 % | RESPIRATION RATE: 24 BRPM | BODY MASS INDEX: 23.42 KG/M2 | DIASTOLIC BLOOD PRESSURE: 84 MMHG | HEIGHT: 68 IN

## 2017-11-14 DIAGNOSIS — J91.0 MALIGNANT PLEURAL EFFUSION: ICD-10-CM

## 2017-11-14 DIAGNOSIS — C34.32 PRIMARY MALIGNANT NEOPLASM OF BRONCHUS OF LEFT LOWER LOBE (HCC): ICD-10-CM

## 2017-11-14 LAB
APTT PPP: 33.1 SECONDS (ref 24.1–34.8)
INR PPP: 1 (ref 0.91–1.09)
PLATELET # BLD AUTO: 427 10*3/MM3 (ref 130–400)
PROTHROMBIN TIME: 13.5 SECONDS (ref 11.9–14.6)

## 2017-11-14 PROCEDURE — 76604 US EXAM CHEST: CPT

## 2017-11-14 PROCEDURE — 85730 THROMBOPLASTIN TIME PARTIAL: CPT | Performed by: INTERNAL MEDICINE

## 2017-11-14 PROCEDURE — 85049 AUTOMATED PLATELET COUNT: CPT | Performed by: INTERNAL MEDICINE

## 2017-11-14 PROCEDURE — 85610 PROTHROMBIN TIME: CPT | Performed by: INTERNAL MEDICINE

## 2017-11-14 NOTE — NURSING NOTE
Pt returned from US. Procedure cancelled due to pt not having any fluid to drain. Pt to be discharged home.

## 2017-11-29 PROBLEM — J96.21 ACUTE ON CHRONIC RESPIRATORY FAILURE WITH HYPOXIA (HCC): Status: ACTIVE | Noted: 2017-01-01

## 2017-11-29 PROBLEM — J18.9 HCAP (HEALTHCARE-ASSOCIATED PNEUMONIA): Status: ACTIVE | Noted: 2017-01-01

## 2017-11-29 NOTE — PROGRESS NOTES
Pharmacy Note  Vancomycin Consult    Niko Martines is a 68 y.o. male started on Vancomycin for pneumonia; consult received from Dr. Marbin Hernandez to manage therapy. Patient Active Problem List   Diagnosis    Primary malignant neoplasm of bronchus of left lower lobe ([address]    History of therapeutic radiation    Encounter for follow-up examination after completed treatment for malignant neoplasm    HTN (hypertension)    Hyperlipidemia    Community acquired pneumonia    Lactic acidosis    Sepsis due to undetermined organism ([address]    Pneumonia of both lungs due to infectious organism    Respiratory failure ([address]    Radiation pneumonitis ([address]    Fluid overload    Pulmonary edema, acute ([address]    Pulmonary hypertension    Hyperglycemia, drug-induced    Malignant neoplasm of unspecified part of unspecified bronchus or lung ([address]    HCAP (healthcare-associated pneumonia)       Allergies:  Ambien [zolpidem tartrate]     Temp max: 98.3    Recent Labs      11/29/17   1050   BUN  11       Recent Labs      11/29/17   1050   CREATININE  0.6       Recent Labs      11/29/17   1050   WBC  10.4       No intake or output data in the 24 hours ending 11/29/17 1319    Culture Date Source Results   11/29/17 blood collected                 Ht Readings from Last 1 Encounters:   11/29/17 5' 8\" (1.727 m)        Wt Readings from Last 1 Encounters:   11/29/17 152 lb (68.9 kg)         Body mass index is 23.11 kg/m². Estimated Creatinine Clearance: 100 mL/min (based on SCr of 0.6 mg/dL). Assessment/Plan:  Will initiate vancomycin 1000 mg IV every 12 hours. Timing of trough level will be determined based on culture results, renal function, and clinical response. Thank you for the consult. Will continue to follow.     Electronically signed by Raffaele Harper, [address] [place] [address] on 11/29/2017 at 1:19 PM

## 2017-11-29 NOTE — CARE COORDINATION
Pt current with [facility] and therapy. Pt lives at home with his wife. Pt has an O2 concentrator in the home from [facility], as well as a walker and a shower bench. At this time, there are no further DME needs. SW will continue to follow and assist as needed.

## 2017-11-29 NOTE — ED PROVIDER NOTES
complete review of systems was performed and is negative except as noted above in the HPI.        PAST MEDICAL HISTORY     Past Medical History:   Diagnosis Date    Cancer [address] [address] [facility]     Lung, Prostate    Cataract     HTN (hypertension) 6/22/2017    Hyperlipidemia     Hyperlipidemia 6/22/2017    Hypertension     Palliative care encounter          SURGICAL HISTORY       Past Surgical History:   Procedure Laterality Date    BRONCHOSCOPY  07/2/14    COLONOSCOPY      LOBECTOMY Left     left lower    LUNG BIOPSY  4/21/15    PROSTATECTOMY  04/22/14    THORACOTOMY Left 06/10/15         CURRENT MEDICATIONS       Previous Medications    BUDESONIDE-FORMOTEROL (SYMBICORT) 80-4.5 MCG/ACT AERO    Inhale 2 puffs into the lungs    DOCUSATE (COLACE, DULCOLAX) 100 MG CAPS    Take 100 mg by mouth    FOLIC ACID (FOLVITE) 1 MG TABLET        LEVALBUTEROL (XOPENEX) 1.25 MG/0.5ML NEBULIZER SOLUTION    Inhale 2.5 mg into the lungs    MEGESTROL (MEGACE ES) 625 MG/5ML SUSPENSION    Take 40 mg by mouth    SIMVASTATIN (ZOCOR) 40 MG TABLET    Take 40 mg by mouth nightly       ALLERGIES     Ambien [zolpidem tartrate]    FAMILY HISTORY       Family History   Problem Relation Age of Onset    Cancer Mother     Cancer Father     Cancer Maternal Uncle           SOCIAL HISTORY       Social History     Social History    Marital status:      Spouse name: N/A    Number of children: N/A    Years of education: N/A     Social History Main Topics    Smoking status: Former Smoker    Smokeless tobacco: Never Used    Alcohol use No    Drug use: No    Sexual activity: Not on file     Other Topics Concern    Not on file     Social History Narrative    No narrative on file       SCREENINGS             PHYSICAL EXAM    (up to 7 for level 4, 8 or more for level 5)     ED Triage Vitals [11/29/17 0957]   BP Temp Temp Source Pulse Resp SpO2 Height Weight   130/86 98.3 °F (36.8 °C) Temporal 108 24 (!) 89 % 5' 8\" (1.727 m) 152 lb (68.9 kg) Physical Exam   Constitutional: He is oriented to person, place, and time. He appears well-developed. No distress. HENT:   Head: Normocephalic and atraumatic. Mouth/Throat: No oropharyngeal exudate. Eyes: Pupils are equal, round, and reactive to light. No scleral icterus. Neck: Normal range of motion. Neck supple. No JVD present. Cardiovascular: Normal rate, regular rhythm, normal heart sounds and intact distal pulses. Pulmonary/Chest: Effort normal. Tachypnea noted. No respiratory distress. He has rhonchi. Abdominal: Soft. He exhibits no distension. There is no tenderness. Musculoskeletal: He exhibits no edema or tenderness. Neurological: He is alert and oriented to person, place, and time. Skin: Skin is warm and dry. Psychiatric: He has a normal mood and affect. His behavior is normal.   Vitals reviewed. DIAGNOSTIC RESULTS     EKG: All EKG's are interpreted by the Emergency Department Physician who either signs or Co-signs this chart in the absence of a cardiologist.    Sinus tachycardia. Normal QT interval. Nonspecific ST changes in the lateral and inferior leads but no signs of acute ischemia compared to prior EKG. RADIOLOGY:   Non-plain film images such as CT, Ultrasound and MRI are read by the radiologist. Plain radiographic images are visualized and preliminarily interpreted by the emergency physician with the below findings:    Interpretation per the Radiologist below, if available at the time of this note:    XR Chest Portable   Final Result   1.. Progressive opacification of the left hemithorax with progressive   volume loss and mediastinal shift to the left. This would suggest that   the majority of the opacification is related to collapse of the   remaining left lung rather than effusion. 2. Persistent mixed interstitial and alveolar disease in a   reticulonodular pattern of the right lung including rather dense right   perihilar consolidation.  This shows slight CT for now. He will see in consult. He is agreeable with plan for broad-spectrum antibiotics which I will order. I spoke with patient about his code status. He wishes to be DNR. He said he does not want to be intubated. His case has been discussed with hospitalist, Dr. Jean Black, who is agreeable with plan of care and admission. CONSULTS:  IP CONSULT TO PULMONOLOGY  IP CONSULT TO PHARMACY    PROCEDURES:  Unless otherwise noted below, none     Procedures    FINAL IMPRESSION      1. Malignant neoplasm of lung, unspecified laterality, unspecified part of lung ([facility] [address]    2. Acute on chronic respiratory failure with hypoxia (HCC)    3. Possible Pneumonia due to organism          DISPOSITION/PLAN   DISPOSITION Decision to Admit    PATIENT REFERRED TO:  No follow-up provider specified.     DISCHARGE MEDICATIONS:  New Prescriptions    No medications on file          (Please note that portions of this note were completed with a voice recognition program.  Efforts were made to edit the dictations but occasionally words are mis-transcribed.)    Mari Flores MD (electronically signed)  Attending Emergency Physician        Mari Flores MD  11/29/17 1677

## 2017-11-29 NOTE — H&P
[facility] - History & Physical    [id number]  PCP: Samir Canales MD  Date of Admission: 11/29/2017   Date of Service: Pt seen/examined on11/29/2017 and Admitted to Inpatient with expected LOS greater than two midnights due to medical therapy. Chief Complaint:  Shortness of breath    History Of Present Illness: The patient is a 68 y.o. male who presented w/ hx of Lung CA s/p chemo and radiation in the past, now s/p first dose of new treatment after recent discovery of enlarging pulm masses by recent PET. Also s/p PNA several months ago for which he was in [place] [id number] and then rehab. Pt has been home since early October and wife relates he pretty much started to decline again when he got home. He did work with PT but outside that is pretty much bed bound due to extreme dyspnea. Recently he has increased productive cough, unable to speak in full sentences, generalized weakness, poor appetite. Denies fevers/chills. No chest pain, dizziness, headache, n/v, constipation, diarrhea. Imaging here in ER w/ question of PNA vs partial collapse of lung. Case discussed w/ Pulm by ER physician. Past Medical History:        Diagnosis Date    Cancer ([facility] [address]     Lung, Prostate    Cataract     HTN (hypertension) 6/22/2017    Hyperlipidemia     Hyperlipidemia 6/22/2017    Hypertension     Palliative care encounter        Past Surgical History:        Procedure Laterality Date    BRONCHOSCOPY  07/2/14    COLONOSCOPY      LOBECTOMY Left     left lower    LUNG BIOPSY  4/21/15    PROSTATECTOMY  04/22/14    THORACOTOMY Left 06/10/15       Home Medications:  Prior to Admission medications    Medication Sig Start Date End Date Taking?  Authorizing Provider   Diphenhydramine-APAP, sleep, (TYLENOL PM EXTRA STRENGTH PO) Take by mouth nightly as needed   Yes Historical Provider, MD   gabapentin (NEURONTIN) 300 MG capsule Take 300 mg by mouth nightly   Yes Historical Provider, MD   benzonatate (TESSALON) 100 MG capsule Take 200 mg by mouth 3 times daily as needed for Cough   Yes Historical Provider, MD   acetaminophen (TYLENOL) 500 MG tablet Take 1,000 mg by mouth every 6 hours as needed for Pain   Yes Historical Provider, MD   budesonide-formoterol (SYMBICORT) 80-4.5 MCG/ACT AERO Inhale 2 puffs into the lungs 9/11/17  Yes Historical Provider, MD   docusate (COLACE, DULCOLAX) 100 MG CAPS Take 100 mg by mouth daily  9/11/17  Yes Historical Provider, MD   levalbuterol Clay Buff) 1.25 MG/0.5ML nebulizer solution Inhale 2.5 mg into the lungs 9/11/17  Yes Historical Provider, MD   folic acid (FOLVITE) 1 MG tablet  4/5/17  Yes Historical Provider, MD   simvastatin (ZOCOR) 40 MG tablet Take 40 mg by mouth nightly   Yes Historical Provider, MD       Allergies:    Ambien [zolpidem tartrate]    Social History:    The patient currently lives at home  Tobacco:   reports that he has quit smoking. He has never used smokeless tobacco.  Alcohol:   reports that he does not drink alcohol. Illicit Drugs: denies    Family History:      Problem Relation Age of Onset    Cancer Mother     Cancer Father     Cancer Maternal Uncle        Review of Systems:   As per HPI, rest of 14 point ROS reviewed and negative. Physical Examination:  /86   Pulse 104   Temp 97.5 °F (36.4 °C) (Temporal)   Resp 18   Ht 5' 8\" (1.727 m)   Wt 152 lb (68.9 kg)   SpO2 90%   BMI 23.11 kg/m²   General appearance: alert, appears stated age, cooperative and no distress, no accessory muscle use, speaks in short sentences. Head: Normocephalic, without obvious abnormality, atraumatic  Eyes: conjunctivae/corneas clear. PERRL, EOM's intact.    Ears: normal external ears  Neck: no adenopathy, no carotid bruit, no JVD, supple, symmetrical, trachea midline  Lungs: coarse bs b/l, diminished severely on L  Heart: regular rate and rhythm, S1, S2 normal  Abdomen:soft, non-tender, non-distended, +BS  Extremities: no edema, no trauma  Skin: Skin color, texture, turgor normal. No rashes or lesions  Lymphatic: No palpable lymph node enlargment  Neurologic: Alert and oriented X 3, normal strength and tone. Normal symmetric reflexes. Mental status: Alert, oriented, thought content appropriate  Cranial nerves:II-XII Grossly intact Sensory: normal Motor:grossly normal        Diagnostic Data:    CBC:  Recent Labs      11/29/17   1050   WBC  10.4   HGB  12.6*   HCT  41.1*   PLT  384     BMP:  Recent Labs      11/29/17   1050  11/29/17   1057   NA  140   --    K  4.0  3.9   CL  96*   --    CO2  31*   --    BUN  11   --    CREATININE  0.6   --    CALCIUM  9.4   --      Recent Labs      11/29/17   1050   AST  22   ALT  26   BILITOT  0.3   ALKPHOS  50     Coag Panel: No results for input(s): INR, PROTIME, APTT in the last 72 hours. Cardiac Enzymes: No results for input(s): Gila Murry in the last 72 hours. ABGs:  Lab Results   Component Value Date    PHART 7.490 11/29/2017    PO2ART 50.0 11/29/2017    TCQ1RVX 41.0 11/29/2017     Urinalysis:  Lab Results   Component Value Date    NITRU NEGATIVE 06/08/2015    WBCUA 0 06/08/2015    BACTERIA NEGATIVE 06/08/2015    RBCUA 1 06/08/2015    GLUCOSEU NEGATIVE 06/08/2015      XR Chest Portable [[id number]] Resulted: 11/29/17 1142     Order Status: Completed Updated: 11/29/17 1044     Narrative:       EXAMINATION: Portable one view chest radiograph 11/29/2017  HISTORY: Dyspnea  FINDINGS: Today's exam is compared to previous study of 8/21/2017. The  patient's undergone previous partial left lung resection. There is  opacification of the left hemithorax with mediastinal shift to the  left. I suspect this represents a combination of collapse of the lung  and effusion. There is persistent diffuse perihilar consolidation as  well as reticulonodular disease of the right lung.  This shows slight  improvement in the right upper lobe with some worsening in the right  lower lobe likely representing a combination of this pneumonia and/or  edema. A transvenous pacer remains in place.     Impression:       1. . Progressive opacification of the left hemithorax with progressive  volume loss and mediastinal shift to the left. This would suggest that  the majority of the opacification is related to collapse of the  remaining left lung rather than effusion. 2. Persistent mixed interstitial and alveolar disease in a  reticulonodular pattern of the right lung including rather dense right  perihilar consolidation. This shows slight improvement in the right  upper lobe but worsening in the right lower and middle lobe from the  previous study. 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 HCAP. At this time will treat as infection. Started on abx. Will get pulm and onc consultations. 02 and wean as tolerated. Active Problems:    Primary malignant neoplasm of bronchus of left lower lobe (HCC) - onc consult. S/p 1st dose of new chemo     HTN (hypertension) - by history, currently normotensive, monitor    Hyperlipidemia - on statin    HCAP (healthcare-associated pneumonia) - started on cefepime/vanco by Er, will continue for now. Pulm to eval for any other possible interventions/recommendations.          Marium Abad, DO  11/29/2017

## 2017-11-29 NOTE — ED TRIAGE NOTES
Pt has Lung CA and has half of left lung and full right lung, CA worsening to lymph nodes.  Pt SOB worsening

## 2017-11-29 NOTE — ED NOTES
1110: Dr. Jennifer Allen returned call.      [facility] File  11/29/17 1124

## 2017-11-29 NOTE — CONSULTS
K  4.0  3.9   CL  96*   --    CO2  31*   --    BUN  11   --    CREATININE  0.6   --    CALCIUM  9.4   --    GLUCOSE  116*   --       Recent Labs      11/29/17   1057   PHART  7.490*   [id number]  41.0   PO2ART  50.0*   [id number]  31.2*   [id number]  88.0*   BEART  7.2*     Recent Labs      11/29/17   1050   AST  22   ALT  26   ALKPHOS  50   BILITOT  0.3   CALCIUM  9.4     Radiograph:  Examination. CT CHEST W CONTRAST   History: History of lung cancer and pneumonia. The CT scan of the chest is performed after intravenous contrast   enhancement. The images are acquired in axial plane with subsequent reconstruction   in coronal and sagittal planes. The patient has a previous CT scan at an outside facility which is not   available for comparison at this time. There is an area of consolidation with air bronchogram involving the   left upper lobe. This is represent pneumonic consolidation. There is a   small amount of pleural effusion at the left base. There is coarse interstitial infiltrate in the right lower lobe and   the right upper lobe and medial anterior part of the right middle   lobe. There is loss of left lung volume and displacement of the   cardiomediastinal structures into the left chest.   Atheromatous changes of the thoracic aorta are seen. No aneurysmal   dilatation. There are calcified granulomas in the mediastinum and ayad   bilaterally. There is no evidence of mediastinal or hilar lymphadenopathy or a   mass. A MediPort system is seen introduced through the left subclavian vein   and distal end of the catheter is seen in the distal superior vena   curve. There is a small filling defect adjacent to the catheter, at   the junction of both brachiocephalic veins, which may represent a   small thrombus. No obstruction. The visualized liver and spleen appear normal.   The adrenal glands appear normal.   There is moderate dilatation of the large bowel loops.    The images reviewed in bone window show no focal bony abnormality or   bony lesion.       Impression   Bilateral pneumonic infiltrate more severely in the left   upper lobe. Left-sided pleural effusion. Loss of left lung volume with displacement of the cardiomediastinal   structures towards left. This is due to a prior surgery/lobectomy. A MediPort system in place. No evidence of lymphadenopathy. Signed by Dr Alistair Valle on 8/16/2017 11:48 AM       My radiograph interpretation/independent review of imaging: current ct in comparison with recent ct scans over past 4 months (at both [facility]) shows slow progressive densities on left side and to lesser extent on the right. Other test results (not lab or imaging):       A. LUNG, LEFT LOWER LOBECTOMY:          1. WELL DIFFERENTIATED LUNG ADENOCARCINOMA WITH PREDOMINATELY LEPEDIC          SPREAD (BRONCHIOLOALVEOLAR CARCINOMA), 10.5 CM IN GREATEST DIMENSION.         2. NEGATIVE FOR DEFINITIVE PLEURAL INVASION.         3. BRONCHIAL AND VASCULAR EXCISION MARGINS, NEGATIVE FOR EVIDENCE OF          MALIGNANCY, ALTHOUGH TUMOR CELLS ARE NOTED IN THE INTRALUMENAL MUCUS.         4. NONNEOPLASTIC PULMONARY PARENCHYMA WITH MILD EMPHYSEMATOUS          CHANGES.         5. FIVE PERIBRONCHIAL LYMPH NODES, NEGATIVE FOR EVIDENCE OF          MALIGNANCY.      B. LYMPH NODES, STATION 9L LYMPH NODE BIOPSY:          TWO LYMPH NODES, NEGATIVE FOR EVIDENCE OF MALIGNANCY.     C.  LYMPH NODE, STATION 7L LYMPH NODE BIOPSY:          ONE LYMPH NODE, NEGATIVE FOR EVIDENCE OF MALIGNANCY.      AJCC STAGE:  pTis, pN0, pMx     COMMENT: Although no invasive carcinoma is noted in this extensively     sampled tumor, the size of the tumor itself precludes the definitive     exclusion of invasion.         Dictated by: Elizabeth Gloria MD    Patient Active Problem List   Diagnosis    Primary malignant neoplasm of bronchus of left lower lobe ([facility] [address]    History of therapeutic radiation    Encounter for follow-up examination after completed treatment for malignant neoplasm    HTN (hypertension)    Hyperlipidemia    Community acquired pneumonia    Lactic acidosis    Sepsis due to undetermined organism ([facility] [address]    Pneumonia of both lungs due to infectious organism    Acute on chronic respiratory failure with hypoxia (HCC)    Radiation pneumonitis (HCC)    Fluid overload    Pulmonary edema, acute (HCC)    Pulmonary hypertension    Hyperglycemia, drug-induced    Malignant neoplasm of unspecified part of unspecified bronchus or lung ([facility] [address]    HCAP (healthcare-associated pneumonia)     Pulmonary Assessment:  New problem (to me), with additional workup planned: pneumonia complicating lung cancer  New problem (to me), no additional workup planned: lung cancer, worsening  Other problems either stable, failing to improve or worsenin. Pulmonary hypertension  2. Radiation pneumonitis  3. Anemia  4. Metabolic alkalosis  5. Other historical problems are listed in the problem list but many are resolved.   In particular sepsis is not a current diagnosis in my opinion    Recommend/plan:   · Broad spectrum antibiotics, started  · Iv steroids  · Continue nasal oxygen    Electronically signed by Tai Hernandez on 17 at 5:30 PM

## 2017-11-30 PROBLEM — J96.21 ACUTE AND CHRONIC RESPIRATORY FAILURE WITH HYPOXIA (HCC): Status: ACTIVE | Noted: 2017-01-01

## 2017-11-30 NOTE — CONSULTS
MEDICAL ONCOLOGY CONSULTATION    Pt Name: Komal York  MRN: [id number]  YOB: 1940  Date of evaluation: 11/29/2017    REASON FOR CONSULTATION: Lung CA  REQUESTING PHYSICIAN: Hospitalist      HISTORY OF PRESENT ILLNESS:    Evaristo Velazquez is being admitted with worsening shortness breath and acute on chronic respiratory failure with hypoxemia with concerning for progressive lung cancer vs infectious process. We are being consulted for his history of lung cancer. He has been recently admitted at [facility] with a prolonged hospitalization and discharge to a rehabilitation center. He has not made any full recovery of his lung function. He was last seen in clinic a few weeks ago. There was a concerning about progression of his pulmonary malignancy, and therefore we discussed second line with immunotherapy. He did receive treatment on 11/27/2017 with immunotherapy ×1 dose. Diagnosis   Stage 0 April 2015   Recurrent NSCLC-adenocarcinoma   Molecular profile: EGFR/ALK/ROS negative  Treatment summary:  Left lower lobe lobectomy 6/10/2015   Carboplatin/Alimta ×4 cycles adjuvant completed October 2015   Carboplatin /Alimta x 4 cycles(12/01/2017->2/2/2017) with concurrent XRT for a total of 6000 cGy given from 12/4/2016-02/10/2017 over 30 fractions. Alimta maintenance therapy every 3 weeks initiated on 4/5/2017. Cycle #2 (25% dose reduction) on 5/17/2017 and held afterwards due to poor PS and fatigue. JuneAugust 2017- he received steroids for radiation pneumonitis, with marked improvement of his respiratory symptoms. Alimta maintenance on hold. 11/20/2017progressive disease as per PET- recommended second line therapy with immunotherapy- Opdivo. Target lesion  November 2011 PET/CT scan showed increase metabolic activity within the consolidated left lung particularly where there is moderate pleural thickening.  There is thickening is more diffuse with areas of nodular hypermetabolism SUV 6.5. In the right subhilar region nodular foci of increased activity concerning for progressive disease. Lymph nodes at the SMA level in the abdomen SUV 7.0. Extensive changes of radiation fibrosis  Interval history  Mr. Marika Gallo is a 68years old male with a diagnosis of non-small cell lung cancer. Gisela Prajapati has completed concurrent therapy with XRT and 4 cycles of carboplatin/Alimta for his locally advanced recurrent lung cancer. Joaquín received maintenance therapy with Alimta at a dose reduction of 25% due to significant fatigue. Alimta has been on hold after cycle #2 on 5/17/2017 due to severe fatigue. Re-staging scans June 2017 showed a consolidation RLL concerning for atelectasis vs radiation pneumonitis vs tumor progression. He was seen by pulmonary and had a bronchoscopy performed that was unremarkable. He was given antibiotics and steroids with marked improvement. Unfortunately, he was hospitalized for several weeks at [facility] for lung issues (pneumonia) and then discharged to Rehab and subsequently home with home health. He returns today for continued follow up. Here had a PET scan and is here. Cancer history- Locally relapsed(mediastinal) Lung Cancer-adenocarcinoma EGFR, ALK and ROS negative  The patient is a 68years old male first seen by me on 11/18/2016 referred by Dr. Carmina Mccallum office. The following are landmark events on this patient cancer history:  Preoperative x-ray performed before a radical prostatectomy on 4/22/2014 showed a left lower lobe pulmonary infiltrate   Referred to respiratory medicine/bronchoscopy 7/2/2014 was unrevealing. Repeat CT scan September 2014 compared with April 2014no change in the infiltrate pattern. Repeat CT April 2015lung nodule associated with infiltrated. 4/21/2015left lower lobe final needle aspiration= well-differentiated papillary adenocarcinoma consistent with a lung primary demonstrating a lipidic growth pattern.  TTF-1 positive, napsin A 4/5/2017-started on maintenance therapy with Alimta 500 mg/m². NCI toxicity, fatigue grade 2. Patient requested to delay cycle #2 for 3 weeks. Cycle 2 delivered 5/17/2016 with 25% dose reduction Alimta. 6/1/2017- CT scan of chest revealed volume loss of the left hemothorax with elevation of the left hemidiaphragm and shift of the mediastinum structures. Near complete consolidation of the left lung with air bronchograms with minimal aerated lung appreciated in the apex and lung base. Developing an interval increase in infiltrate of nodular opacities in the right lung, perihilar/upper and lower lobe regions. Developing irregular nodular opacity RUL. RUL nodule measuring 5 mm, previously measured 3 mm. (Dr. César Farah spoke with radiologist related to results with differential diagnosis being radation pneumonits vs pneumonia vs lung collapse versus progression of disease). He was treated with Levofloxacin x 7 days. 6/1/2017- CT scan of abdomen and pelvis revealed stable appearance from 3/13/2017. Mildly prominent periaortic lymph nodes, stable. No evidence of neoplastic disease. 06/08/2017- He was seen by pulmonary Dr Diane Cain. 06/13/2017- Bronchoscopy was performed and did not reveal any obstructive lesion. Cytology and others BAL studies were unremarkable. He wa spescribed inhaled steroids and long B2 agonists. 06/26/2017- Mild improvement of his respiratory symptoms. We discussed about giving steroids for anorexia, possible radiation pneumonitis and fatigue. Continue to hold alimta maintenance due to poor performance. Prednisone prescribed 1 mg to take   7/28/2017- Alimta maintenance on hold. He was given steroids during the last visit and has gained about 5 pounds. His respiratory symptoms have improved markedly. Therefore, I believe that this was consistent with radiation pneumonitis. He wants to hold his Alimta until the next set of scans in September 2017.    September 2017prolonged hospitalization at [facility] with pneumonia. Patient was discharged home. 11/09/2017- PET/CT scan showed increase metabolic activity within the consolidated left lung particularly where there is moderate pleural thickening. There is thickening is more diffuse with areas of nodular hypermetabolism SUV 6.5. In the right subhilar region nodular foci of increased activity concerning for progressive disease. Lymph nodes at the SMA level in the abdomen SUV 7.0. Extensive changes of radiation fibrosis.   ----------------------------------------------------------------------------------------  Cancer history #2-Prostate cancer (GS 3+4) 04/2014  Radical prostatectomy on 4/22/2014 [id number]   PSA <0.075 01/04/2017.   This is being followed by his urologist.     Past Medical History:    Past Medical History:   Diagnosis Date    Cancer ([facility] [address]     Lung, Prostate    Cataract     HTN (hypertension) 6/22/2017    Hyperlipidemia     Hyperlipidemia 6/22/2017    Hypertension     Palliative care encounter        Past Surgical History:    Past Surgical History:   Procedure Laterality Date    BRONCHOSCOPY  07/2/14    COLONOSCOPY      LOBECTOMY Left     left lower    LUNG BIOPSY  4/21/15    PROSTATECTOMY  04/22/14    THORACOTOMY Left 06/10/15       Immunizations:    Immunization History   Administered Date(s) Administered    Influenza, High Dose 10/30/2017       Current Hospital Medications:    Current Facility-Administered Medications: vancomycin (VANCOCIN) 1000 mg in dextrose 5% 200 mL IVPB, 1,000 mg, Intravenous, Q12H  mometasone-formoterol (DULERA) 100-5 MCG/ACT inhaler 2 puff, 2 puff, Inhalation, BID  docusate sodium (COLACE) capsule 100 mg, 100 mg, Oral, Daily  folic acid (FOLVITE) tablet 1 mg, 1 mg, Oral, Daily  levalbuterol (XOPENEX) nebulizer solution 2.5 mg, 2.5 mg, Nebulization, Q6H  simvastatin (ZOCOR) tablet 40 mg, 40 mg, Oral, Nightly  vancomycin (VANCOCIN) intermittent dosing (placeholder), , Other, RX Placeholder  cefepime (MAXIPIME) 2 g IVPB in 50 mL dextrose 5% (premix), 2 g, Intravenous, Q12H    Allergies: Allergies   Allergen Reactions    Ambien [Zolpidem Tartrate] Anxiety     Pt becomes highly agitated and confused       Social History:    Social History     Social History    Marital status:      Spouse name: N/A    Number of children: N/A    Years of education: N/A     Occupational History    Not on file. Social History Main Topics    Smoking status: Former Smoker    Smokeless tobacco: Never Used    Alcohol use No    Drug use: No    Sexual activity: Not on file     Other Topics Concern    Not on file     Social History Narrative    No narrative on file       Family History:   Family History   Problem Relation Age of Onset    Cancer Mother     Cancer Father     Cancer Maternal Uncle        REVIEW OF SYSTEMS:    Constitutional: no fever, no night sweats,  fatigue; weakness  HEENT: no blurring of vision, no double vision, no hearing difficulty, no tinnitus,no ulceration, no dental caries, no dysphagia; Lungs: cough, shortness of breath,wheeze;   CVS: no palpitation, no chest pain  GI: no abdominal pain, no nausea , no vomiting,   MARIELOS: no dysuria, frequency and urgency, no hematuria, no kidney stone  Musculoskeletal: no joint pain, swelling , stiffness  Endocrine: no polyuria, polydypsia, no cold or heat intolerence  Hematology: no anemia, no easy brusing or bleeding, no hx of clotting disorder;  Dermatology: no skin rash, no eczema, no pruritis;   Psychiatry: no depression, no anxiety,no panic attacks, no suicide ideation;    Neurology: no syncope, no seizures, no numbness or tingling of hands, no numbness or tingling of feet, no paresis    Vitals:    /86   Pulse 104   Temp 97.5 °F (36.4 °C) (Temporal)   Resp 18   Ht 5' 8\" (1.727 m)   Wt 152 lb (68.9 kg)   SpO2 90%   BMI 23.11 kg/m²     PHYSICAL EXAM:    CONSTITUTIONAL: Alert, appropriate, acute respiratory distress, sick appearing  EYES: Non icteric, EOM intact, pupils equal round   ENT: Mucus membranes moist, no oral pharyngeal lesions   NECK: Supple, no masses   CHEST/LUNGS: decreased breath sounds LLL, labored respiratory effort   CARDIOVASCULAR: RRR, no murmurs  ABDOMEN: soft non-tender, active bowel sounds, no HSM  EXTREMITIES: warm, moves all fours  SKIN: warm, dry with no rashes or lesions  LYMPH: No cervical, clavicular, axillary, or inguinal lymphadenopathy  NEUROLOGIC: follows commands, non focal   PSYCH: mood and affect appropriate      Recent Labs      11/29/17   1050   WBC  10.4   HGB  12.6*   HCT  41.1*   MCV  91.1   PLT  384       Lab Results   Component Value Date     11/29/2017    K 3.9 11/29/2017    CL 96 (L) 11/29/2017    CO2 31 (H) 11/29/2017    BUN 11 11/29/2017    CREATININE 0.6 11/29/2017    GLUCOSE 116 (H) 11/29/2017    CALCIUM 9.4 11/29/2017    PROT 7.4 11/29/2017    LABALBU 3.2 (L) 11/29/2017    BILITOT 0.3 11/29/2017    ALKPHOS 50 11/29/2017    AST 22 11/29/2017    ALT 26 11/29/2017    LABGLOM >60 11/29/2017       Lab Results   Component Value Date    INR 1.14 08/16/2017    INR 0.95 06/10/2015    PROTIME 14.5 08/16/2017    PROTIME 12.4 06/10/2015       ASSESSMENT/PLAN:    Advanced lung cancerMrLeelee Macias is a 68years old male with a diagnosis of non-small cell lung cancer. Adrienne Mayes has completed concurrent therapy with XRT and 4 cycles of carboplatin/Alimta for his locally advanced recurrent lung cancer. Joaquín received maintenance therapy with Alimta at a dose reduction of 25% due to significant fatigue. Alimta has been on hold after cycle #2 on 5/17/2017 due to severe fatigue. Re-staging scans June 2017 showed a consolidation RLL concerning for atelectasis vs radiation pneumonitis vs tumor progression. He was seen by pulmonary and had a bronchoscopy performed that was unremarkable. He was given antibiotics and steroids with marked improvement.  Unfortunately, in September 2017 he was hospitalized for several weeks at [facility] for lung issues (pneumonia) and then discharged to Rehab and subsequently home with home health. He returns today for continued follow up. Here had a PET scan and is here. November 2011 PET/CT scan showed increase metabolic activity within the consolidated left lung particularly where there is moderate pleural thickening. There is thickening is more diffuse with areas of nodular hypermetabolism SUV 6.5. In the right subhilar region nodular foci of increased activity concerning for progressive disease. Lymph nodes at the SMA level in the abdomen SUV 7.0. Extensive changes of radiation fibrosis  Therefore, progressive disease. We discussed at length about treatment options including the second line immunotherapy. I recommended immunotherapy with Opdivo. · Immunotherapy as second line delivered on 11/27/2017  · 11/29/2017admitted with acute on chronic respiratory failure and hypoxia  · Continue supportive care  · We'll continue to follow.         [facility]    11/29/17  6:30 PM

## 2017-11-30 NOTE — PROGRESS NOTES
Follow up:  Hospice consult noted, report from nurse, [facility] and Dr. Chasidy Núñez. Visit with pt and family, explained hospice care at the in center and the process for transfer. Answered questions. Will move forward with referral and obtaining certification. Emotional support provided.     Electronically signed by Cher Romero RN on 11/30/2017 at 10:42 AM

## 2017-11-30 NOTE — PROGRESS NOTES
Pulmonary and Critical Care Progress Note [facility]    Patient: Rustam Navarro  1940   MR# [id number]   Acct# [de-identified]  11/30/17   11:10 AM  Referring Provider: She Irvin DO  Problem list:   Patient Active Problem List   Diagnosis    Primary malignant neoplasm of bronchus of left lower lobe ([address]    History of therapeutic radiation    Encounter for follow-up examination after completed treatment for malignant neoplasm    HTN (hypertension)    Hyperlipidemia    Community acquired pneumonia    Lactic acidosis    Sepsis due to undetermined organism ([address]    Pneumonia of both lungs due to infectious organism    Acute on chronic respiratory failure with hypoxia (HCC)    Radiation pneumonitis ([address]    Fluid overload    Pulmonary edema, acute ([address]    Pulmonary hypertension    Hyperglycemia, drug-induced    Malignant neoplasm of unspecified part of unspecified bronchus or lung ([address]    HCAP (healthcare-associated pneumonia)     Chief Complaint: Lung cancer    Interval history: Patient is in quite a bit of distress this morning. He is on supplemental oxygen with minimal relief. He is very congested and cannot produce sputum. Wife and family are present and are indicating that he has decided to go ahead and proceed with hospice. No other aggravating or alleviating factors or associated symptoms. .  vancomycin 1,000 mg Intravenous Q12H   mometasone-formoterol 2 puff Inhalation BID   docusate sodium 100 mg Oral Daily   folic acid 1 mg Oral Daily   levalbuterol 2.5 mg Nebulization Q6H   simvastatin 40 mg Oral Nightly   vancomycin (VANCOCIN) intermittent dosing (placeholder)  Other RX Placeholder   cefepime 2 g Intravenous Q12H        Review of Systems:   ROS:    Constitutional: Negative for chills, fever and malaise/fatigue. HENT: Negative for congestion, sinus pain and sore throat. Eyes: Negative for blurred vision. Respiratory: Positive for distress and congestion. Negative for hemoptysis and stridor. Cardiovascular: Negative for chest pain and orthopnea. Gastrointestinal: Negative for abdominal pain, nausea and vomiting. Genitourinary: Negative. Musculoskeletal: Negative for falls and myalgias. Neurological: Negative. Negative for loss of consciousness. Endo/Heme/Allergies: Negative for polydipsia. Psychiatric/Behavioral: The patient is not nervous/anxious.         Physical Exam:  Blood pressure (!) 144/88, pulse 66, temperature 97.8 °F (36.6 °C), temperature source Temporal, resp. rate 22, height 5' 8\" (1.727 m), weight 152 lb (68.9 kg), SpO2 (!) 89 %. Intake/Output Summary (Last 24 hours) at 11/30/17 1110  Last data filed at 11/30/17 0324   Gross per 24 hour   Intake              500 ml   Output              250 ml   Net              250 ml     Physical Exam:    Constitutional: He appears well-developed and well-nourished. Non-toxic appearance. He has a sickly appearance. He appears ill. No distress. Nasal cannula in place. HENT:   Head: Normocephalic and atraumatic. Eyes: EOM are normal.   Neck: No tracheal deviation present. Cardiovascular: Normal rate, regular rhythm and normal heart sounds. Exam reveals no friction rub. No murmur heard. Pulmonary/Chest: Effort normal. No accessory muscle usage. No tachypnea. No respiratory distress. He has decreased breath sounds in the right lower field and the left lower field. He has wheezes. He has rales. He exhibits no tenderness. Abdominal: Soft. Bowel sounds are normal. There is no tenderness. Musculoskeletal: He exhibits no edema. Neurological: He is alert. Psychiatric: He has a normal mood and affect.      Recent Labs      11/29/17   1050  11/30/17   0530   WBC  10.4  10.7   RBC  4.51*  4.24*   HGB  12.6*  11.8*   HCT  41.1*  38.9*   PLT  384  349   MCV  91.1  91.7   MCH  27.9  27.8   MCHC  30.7*  30.3*   RDW  15.0*  15.3*      Recent Labs      11/29/17   1050  11/29/17   1057  11/30/17 documented above.     Electronically signed by Ciera Stallworth MD on 11/30/17 at 8:54 PM

## 2017-11-30 NOTE — PROGRESS NOTES
PROGRESS NOTE  Patient name: Antoni Powers  Patient : 1940      SUBJECTIVE: Marked SOB this am. Tachypneic. He did not sleep well. INTERVAL HISTORY    Jimi Luque is being admitted with worsening shortness breath and acute on chronic respiratory failure with hypoxemia with concerning for progressive lung cancer vs infectious process. We are being consulted for his history of lung cancer. He has been recently admitted at [facility] with a prolonged hospitalization and discharge to a rehabilitation center. He has not made any full recovery of his lung function. He was last seen in clinic a few weeks ago. There was a concerning about progression of his pulmonary malignancy, and therefore we discussed second line with immunotherapy. He did receive treatment on 2017 with immunotherapy ×1 dose. Diagnosis   Stage 0 2015   Recurrent NSCLC-adenocarcinoma   Molecular profile: EGFR/ALK/ROS negative  Treatment summary:  Left lower lobe lobectomy 6/10/2015   Carboplatin/Alimta ×4 cycles adjuvant completed 2015   Carboplatin /Alimta x 4 cycles(2017->2017) with concurrent XRT for a total of 6000 cGy given from 2016-02/10/2017 over 30 fractions. Alimta maintenance therapy every 3 weeks initiated on 2017. Cycle #2 (25% dose reduction) on 2017 and held afterwards due to poor PS and fatigue. 2017- he received steroids for radiation pneumonitis, with marked improvement of his respiratory symptoms. Alimta maintenance on hold. 2017progressive disease as per PET- recommended second line therapy with immunotherapy- Opdivo. Target lesion  2011 PET/CT scan showed increase metabolic activity within the consolidated left lung particularly where there is moderate pleural thickening. There is thickening is more diffuse with areas of nodular hypermetabolism SUV 6.5.  In the right subhilar region nodular foci of increased activity concerning for progressive disease. Lymph nodes at the SMA level in the abdomen SUV 7.0. Extensive changes of radiation fibrosis  Interval history  Mr. Temitope Leslie is a 68years old male with a diagnosis of non-small cell lung cancer. Raisa Miles has completed concurrent therapy with XRT and 4 cycles of carboplatin/Alimta for his locally advanced recurrent lung cancer. Joaquín received maintenance therapy with Alimta at a dose reduction of 25% due to significant fatigue. Alimta has been on hold after cycle #2 on 5/17/2017 due to severe fatigue. Re-staging scans June 2017 showed a consolidation RLL concerning for atelectasis vs radiation pneumonitis vs tumor progression. He was seen by pulmonary and had a bronchoscopy performed that was unremarkable. He was given antibiotics and steroids with marked improvement. Unfortunately, he was hospitalized for several weeks at [facility] for lung issues (pneumonia) and then discharged to Rehab and subsequently home with home health. He returns today for continued follow up. Here had a PET scan and is here. Cancer history- Locally relapsed(mediastinal) Lung Cancer-adenocarcinoma EGFR, ALK and ROS negative  The patient is a 68years old male first seen by me on 11/18/2016 referred by Dr. Melinda Carlos office. The following are landmark events on this patient cancer history:  Preoperative x-ray performed before a radical prostatectomy on 4/22/2014 showed a left lower lobe pulmonary infiltrate   Referred to respiratory medicine/bronchoscopy 7/2/2014 was unrevealing. Repeat CT scan September 2014 compared with April 2014no change in the infiltrate pattern. Repeat CT April 2015lung nodule associated with infiltrated. 4/21/2015left lower lobe final needle aspiration= well-differentiated papillary adenocarcinoma consistent with a lung primary demonstrating a lipidic growth pattern. TTF-1 positive, napsin A positive.  CDX2 cytoplasmic positivity but this was not consistent with colon malignancy. PSA was negative   4/29/2016PET CT scan with increased SUV uptake in the left lower lobe (SUV 7.6), mass measures 7.1 x 4.9 cm. Suspicious right hilar adenopathy. 6/10/2015-left lower lobe lobectomywell-differentiated lung adenocarcinoma with predominant lepidic pattern. Margins negative. 5 peribronchial lymph nodes negative for metastatic disease. Tumor size 10.6 cm. No invasive component found. [id number] (stage 0). Molecular arkers (EGFR, ALK, ROS-1 were all negative)   Adjuvant chemotherapy carboplatin/Alimta ×4 cycles completed October 2015   Patient placed on surveillance follow-up with imaging surveillance. -------------------------------- relapse October 2016--------------------------  10/26/2016- PET/CT scan showing increased FDG uptake within the prevascular mediastinal lymph nodes (6.4). Hypermetabolic AP window and bilateral hilar lymph nodes (SUV 5.5). FDG activity of the left pleural thickening (SUV 5.5). Findings are consistent with recurrent disease. No evidence of disease below the diaphragm. 11/7/[id number]-[id number] biopsy ([facility]) mediastinal node consistent with recurrent well-differentiated papillary adenocarcinoma. 11/18/2016- Recommended again a platinum based treatment with Carboplatin/Alimta + RT followed by Alimta maintenance   11/18/2016- Guardant 360 sent- no clinically significant mutation found. (MET T913m)   11/28/2016 MRI brain re-staging- ALEC   3/13/2017- CT scan of abdomen and pelvis revealed a periaortic lymph node in the upper abdomen measuring 12 mm compared to 9.5 mm and a second periaortic lymph node measuring 12.5 mm compared to 7.5 mm.   3/13/2017- CT scan of chest revealed extensive new nodular consolidation in the left mid lung with air bronchograms concerning for neoplasm or acute pneumonitis. Suspect acute pneumonitis secondary to XRT. No measurable mediastinal lymphadenopathy. 4/5/2017-started on maintenance therapy with Alimta 500 mg/m².  NCI toxicity, fatigue grade 2. Patient requested to delay cycle #2 for 3 weeks. Cycle 2 delivered 5/17/2016 with 25% dose reduction Alimta. 6/1/2017- CT scan of chest revealed volume loss of the left hemothorax with elevation of the left hemidiaphragm and shift of the mediastinum structures. Near complete consolidation of the left lung with air bronchograms with minimal aerated lung appreciated in the apex and lung base. Developing an interval increase in infiltrate of nodular opacities in the right lung, perihilar/upper and lower lobe regions. Developing irregular nodular opacity RUL. RUL nodule measuring 5 mm, previously measured 3 mm. (Dr. Malena Mchugh spoke with radiologist related to results with differential diagnosis being radation pneumonits vs pneumonia vs lung collapse versus progression of disease). He was treated with Levofloxacin x 7 days. 6/1/2017- CT scan of abdomen and pelvis revealed stable appearance from 3/13/2017. Mildly prominent periaortic lymph nodes, stable. No evidence of neoplastic disease. 06/08/2017- He was seen by pulmonary Dr Quinton Lesches. 06/13/2017- Bronchoscopy was performed and did not reveal any obstructive lesion. Cytology and others BAL studies were unremarkable. He wa spescribed inhaled steroids and long B2 agonists. 06/26/2017- Mild improvement of his respiratory symptoms. We discussed about giving steroids for anorexia, possible radiation pneumonitis and fatigue. Continue to hold alimta maintenance due to poor performance. Prednisone prescribed 1 mg to take   7/28/2017- Alimta maintenance on hold. He was given steroids during the last visit and has gained about 5 pounds. His respiratory symptoms have improved markedly. Therefore, I believe that this was consistent with radiation pneumonitis. He wants to hold his Alimta until the next set of scans in September 2017. September 2017prolonged hospitalization at [facility] with pneumonia. Patient was discharged home.    11/09/2017-

## 2017-11-30 NOTE — PROGRESS NOTES
Nutrition Assessment    Type and Reason for Visit: Initial, Positive Nutrition Screen    Nutrition Recommendations: comfort care, follow as needed    Malnutrition Assessment:  · Malnutrition Status: At risk for malnutrition    Nutrition Diagnosis:   · Problem: Inadequate oral intake  · Etiology: related to Impaired respiratory function-inability to consume food     Signs and symptoms:  as evidenced by Intake 0-25%    Nutrition Assessment:  · Subjective Assessment: +NS for wt loss. Pt is not eating. Has been on Megace at home. Pt and family have chosen hospice care. · Nutrition-Focused Physical Findings:    · Wound Type: None  · Current Nutrition Therapies:  · Oral Diet Orders: General   · Oral Diet intake: 0%  · Oral Nutrition Supplement (ONS) Orders: Standard High Calorie Oral Supplement  · ONS intake: 0%  · Anthropometric Measures:  · Ht: 5' 8\" (172.7 cm)   · Current Body Wt:    · Admission Body Wt: 152 lb (68.9 kg)  · Usual Body Wt:    · % Weight Change:  ,     · Ideal Body Wt: 154 lb (69.9 kg), % Ideal Body    · BMI Classification: BMI 18.5 - 24.9 Normal Weight    Estimated Intake vs Estimated Needs: Intake Less Than Needs    Nutrition Risk Level: Low    Nutrition Interventions:   Continue current diet, Continue current ONS  Continued Inpatient Monitoring    Nutrition Evaluation:   · Evaluation: Goals set   · Goals: comfort      See Adult Nutrition Doc Flowsheet for more detail.      Electronically signed by Cony Frost, MS, RD, LD on 11/30/17 at 12:43 PM    Contact Number: [phone]

## 2017-11-30 NOTE — PROGRESS NOTES
Palliative Care:      Palliative Care  made an initial visit to introduce himself and offer support to Patient who presented with dyspnea and cough. Patient has a history of Lung Cancer. Patient has undergone partial Lobectomy in 2015. Has also undergone Radiation and Chemo. Patient finished all this up in February of this year and just resumed Chemo on Monday by Dr. Sheyla Black. Last PET Scan showed enlargement of Pulmonary masses as well as metastasis outside the Lung. Patient was admitted here a few months ago with Pneumonia and went to an LTAC and then nursing home. Patient has been home since the beginning of October and Wife state that he has gradually declined since then with worsening cough that has been productive and worsening shortness of breath. Past Medical History:      Patient has a history of Lung Cancer, Cataract, HTN, Hyperlipidemia, Hypertension, Hypertension. Advance Directives:      Patient does not have an Advanced Directive, but is a DNR. Pain/Other Symptoms:    Patient is having trouble breathing at this time.  spoke mainly to Patient's Wife so that he would not have to exhaust himself. Activity:         Patient was lying in his bed. Psychological/Spiritual:       Patient has good Spiritual Support. Patient/Family Discussion:      Patient has good Family Support. Patient's Wife is at bedside and Patient has a Daughter employed at [employer]    Plan/expectations:  Patient is unclear of the plan at this time. Wife reports that Doctors are waiting for Patient's Pneumonia to clear up. Wife states that they are waiting to speak with Doctor today. Palliative Care will continue to follow up.         Electronically signed by Chavez Bee on 11/30/2017 at 10:26 AM

## 2017-11-30 NOTE — DISCHARGE SUMMARY
Ht 5' 8\" (1.727 m)   Wt 152 lb (68.9 kg)   SpO2 (!) 89%   BMI 23.11 kg/m²   24HR INTAKE/OUTPUT:    Intake/Output Summary (Last 24 hours) at 11/30/17 1443  Last data filed at 11/30/17 0324   Gross per 24 hour   Intake              500 ml   Output              250 ml   Net              250 ml     General appearance: alert, mild to mod distress but no accessory muscle use, speaks in short sentences, tearful  Head: Normocephalic, without obvious abnormality, atraumatic  Eyes: conjunctivae/corneas clear. PERRL, EOM's intact. Ears: normal external ears  Neck: no adenopathy, no carotid bruit, no JVD, supple, symmetrical, trachea midline  Lungs: coarse bs b/l, diminished severely on L  Heart: regular rate and rhythm, S1, S2 normal  Abdomen:soft, non-tender, non-distended, +BS  Extremities: no edema, no trauma  Neurologic: Alert and oriented X 3, no focal deficits         Consults:   IP CONSULT TO PULMONOLOGY  IP CONSULT TO PHARMACY  IP CONSULT TO ONCOLOGY  PALLIATIVE CARE EVAL  IP CONSULT TO HOSPICE    Significant Diagnostic Studies:     XR Chest Portable [[id number]] Resulted: 11/29/17 1142      Order Status: Completed Updated: 11/29/17 1044     Narrative:       EXAMINATION: Portable one view chest radiograph 11/29/2017  HISTORY: Dyspnea  FINDINGS: Today's exam is compared to previous study of 8/21/2017. The  patient's undergone previous partial left lung resection. There is  opacification of the left hemithorax with mediastinal shift to the  left. I suspect this represents a combination of collapse of the lung  and effusion. There is persistent diffuse perihilar consolidation as  well as reticulonodular disease of the right lung. This shows slight  improvement in the right upper lobe with some worsening in the right  lower lobe likely representing a combination of this pneumonia and/or  edema. A transvenous pacer remains in place.     Impression:       1. . Progressive opacification of the left hemithorax with nightly           Current Facility-Administered Medications   Medication Dose Route Frequency Provider Last Rate Last Dose    melatonin tablet 6 mg  6 mg Oral Nightly PRN Ivette Alcantar MD        morphine injection 1 mg  1 mg Intravenous Q1H PRN Danyelle Sinclair DO        LORazepam (ATIVAN) injection 1 mg  1 mg Intravenous Q2H PRN Danyelle Sinclair DO   1 mg at 11/30/17 1045    ipratropium-albuterol (DUONEB) nebulizer solution 1 ampule  1 ampule Inhalation Q4H PRN Rachel Walker DO        vancomycin (VANCOCIN) 1000 mg in dextrose 5% 200 mL IVPB  1,000 mg Intravenous Q12H Elsi Mustafa MD   Stopped at 11/30/17 0409    mometasone-formoterol (DULERA) 100-5 MCG/ACT inhaler 2 puff  2 puff Inhalation BID Cheyenne Newell MD   2 puff at 11/30/17 0848    docusate sodium (COLACE) capsule 100 mg  100 mg Oral Daily Cheyenne Newell MD        folic acid (FOLVITE) tablet 1 mg  1 mg Oral Daily Cheyenne Newell MD   1 mg at 11/30/17 0848    levalbuterol (Arlyne Button) nebulizer solution 2.5 mg  2.5 mg Nebulization Q6H Cheyenne Newell MD   1.25 mg at 11/30/17 1912    simvastatin (ZOCOR) tablet 40 mg  40 mg Oral Nightly Cheyenne Newell MD        vancomycin [facility]) intermittent dosing (placeholder)   Other RX Placeholder Elsi Mustafa MD        cefepime (MAXIPIME) 2 g IVPB in 50 mL dextrose 5% (premix)  2 g Intravenous Q12H Danyelle Sinclair DO   2 g at 11/30/17 0335       Time Spent on discharge is more than 35 minutes in the examination, evaluation, counseling and review of medications and discharge plan. Rachel Walker D.O. Internal Medicine Hospitalist    Thank you Jordan Macario MD for the opportunity to be involved in this patient's care.  If you have any questions or concerns please feel free to contact me at [phone]

## 2017-12-01 NOTE — H&P
Other Topics Concern    Not on file     Social History Narrative    No narrative on file      Allergies   Allergen Reactions    Ambien [Zolpidem Tartrate] Anxiety     Pt becomes highly agitated and confused      Current Facility-Administered Medications   Medication Dose Route Frequency Provider Last Rate Last Dose    LORazepam (ATIVAN) injection 1 mg  1 mg Intravenous Q2H PRN Pedro Luis Ryan MD   1 mg at 12/01/17 4708    morphine (PF) 100 mg in sodium chloride 0.9 % 100 mL infusion  1 mg/hr Intravenous Continuous Pedro Luis Ryan MD        glycopyrrolate (ROBINUL) injection 0.2 mg  0.2 mg Intravenous Q6H PRN Pedro Luis Ryan MD        morphine injection 1 mg  1 mg Intravenous Q2H PRN Pedro Luis Ryan MD        morphine injection 2 mg  2 mg Intravenous Q2H PRN Pedro Luis Ryan MD   2 mg at 11/30/17 2200    haloperidol lactate (HALDOL) injection 2 mg  2 mg Intravenous Q6H PRN Pedro Luis Ryan MD   2 mg at 11/30/17 2357    sodium chloride flush 0.9 % injection 10 mL  10 mL Intravenous 2 times per day Pedro Luis Ryan MD   10 mL at 11/30/17 2137    sodium chloride flush 0.9 % injection 10 mL  10 mL Intravenous PRN Pedro Luis Ryan MD   10 mL at 12/01/17 0033    bisacodyl (DULCOLAX) suppository 10 mg  10 mg Rectal Daily PRN Pedro Luis Ryan MD        scopolamine (TRANSDERM-SCOP) transdermal patch 1 patch  1 patch Transdermal Q72H Pedro Luis Ryan MD   1 patch at 11/30/17 1846    acetaminophen (TYLENOL) suppository 650 mg  650 mg Rectal Q4H PRN Pedro Luis Ryan MD        ipratropium-albuterol (DUONEB) nebulizer solution 1 ampule  1 ampule Inhalation Q4H PRN Bishop Sprague,         atropine 1 % ophthalmic solution 2 drop  2 drop Sublingual Q4H PRN Pedro Luis Ryan MD   2 drop at 12/01/17 1410      Review of Systems   Unable to perform ROS: Severe respiratory distress        /80   Pulse 108   Temp 98 °F (36.7 °C) (Temporal)   Resp (!) 32    Physical Exam  Gen.: He has lots of secretions, is nonresponsive and in some respiratory distress  and increased secretions. He essentially has obstructed left lung. He has family would like comfort measures only at this time. We will start a morphine drip and intensify Ativan as needed.  See him surviving very long given his current state

## 2017-12-04 LAB
BLOOD CULTURE, ROUTINE: NORMAL
CULTURE, BLOOD 2: NORMAL

## 2018-08-21 NOTE — ED NOTES
Bed: 15  Expected date:   Expected time:   Means of arrival:   Comments:  eric Mistry RN  11/29/17 7094 Call placed to [facility] department to schedule a sooner appointment for patient with any provider available. Left message to call back clinic.

## (undated) DEVICE — TBG SMPL FLTR LINE NASL 02/C02 A/ BX/100

## (undated) DEVICE — TRAP,MUCUS SPECIMEN, 80CC: Brand: MEDLINE

## (undated) DEVICE — SENSR O2 OXIMAX FNGR A/ 18IN NONSTR

## (undated) DEVICE — CUFF,BP,DISP,1 TUBE,ADULT,HP: Brand: MEDLINE

## (undated) DEVICE — THE CHANNEL CLEANING BRUSH IS A NYLON FLEXI BRUSH ATTACHED TO A FLEXIBLE PLASTIC SHEATH DESIGNED TO SAFELY REMOVE DEBRIS FROM FLEXIBLE ENDOSCOPES.

## (undated) DEVICE — SINGLE USE SUCTION VALVE MAJ-209: Brand: SINGLE USE SUCTION VALVE (STERILE)

## (undated) DEVICE — SINGLE USE BIOPSY VALVE MAJ-210: Brand: SINGLE USE BIOPSY VALVE (STERILE)